# Patient Record
Sex: FEMALE | Race: BLACK OR AFRICAN AMERICAN | NOT HISPANIC OR LATINO | URBAN - METROPOLITAN AREA
[De-identification: names, ages, dates, MRNs, and addresses within clinical notes are randomized per-mention and may not be internally consistent; named-entity substitution may affect disease eponyms.]

---

## 2017-07-08 ENCOUNTER — EMERGENCY (EMERGENCY)
Facility: HOSPITAL | Age: 32
LOS: 1 days | Discharge: HOME | End: 2017-07-08
Attending: EMERGENCY MEDICINE | Admitting: EMERGENCY MEDICINE
Payer: MEDICAID

## 2017-07-08 VITALS
HEIGHT: 64 IN | OXYGEN SATURATION: 100 % | HEART RATE: 103 BPM | SYSTOLIC BLOOD PRESSURE: 112 MMHG | WEIGHT: 160.06 LBS | DIASTOLIC BLOOD PRESSURE: 74 MMHG | TEMPERATURE: 98 F | RESPIRATION RATE: 16 BRPM

## 2017-07-08 PROCEDURE — 99282 EMERGENCY DEPT VISIT SF MDM: CPT

## 2017-07-08 PROCEDURE — 99283 EMERGENCY DEPT VISIT LOW MDM: CPT

## 2017-07-08 NOTE — ED ADULT TRIAGE NOTE - CHIEF COMPLAINT QUOTE
pt presents to ED with productive cough, pt states she is 16 weeks pregnant. denies vaginal bleeding. pt c/o intermittent abd pain. denies n/v afebrile.

## 2017-07-12 ENCOUNTER — RECORD ABSTRACTING (OUTPATIENT)
Age: 32
End: 2017-07-12

## 2017-07-12 DIAGNOSIS — Z78.9 OTHER SPECIFIED HEALTH STATUS: ICD-10-CM

## 2017-07-12 DIAGNOSIS — Z13.79 ENCOUNTER FOR OTHER SCREENING FOR GENETIC AND CHROMOSOMAL ANOMALIES: ICD-10-CM

## 2017-08-01 ENCOUNTER — APPOINTMENT (OUTPATIENT)
Dept: ANTEPARTUM | Facility: CLINIC | Age: 32
End: 2017-08-01
Payer: MEDICAID

## 2017-08-01 ENCOUNTER — ASOB RESULT (OUTPATIENT)
Age: 32
End: 2017-08-01

## 2017-08-01 PROCEDURE — 76817 TRANSVAGINAL US OBSTETRIC: CPT

## 2017-08-01 PROCEDURE — 76811 OB US DETAILED SNGL FETUS: CPT

## 2017-08-07 ENCOUNTER — APPOINTMENT (OUTPATIENT)
Dept: ANTEPARTUM | Facility: CLINIC | Age: 32
End: 2017-08-07

## 2017-08-08 ENCOUNTER — APPOINTMENT (OUTPATIENT)
Dept: ANTEPARTUM | Facility: CLINIC | Age: 32
End: 2017-08-08
Payer: SELF-PAY

## 2017-08-08 ENCOUNTER — ASOB RESULT (OUTPATIENT)
Age: 32
End: 2017-08-08

## 2017-08-08 PROCEDURE — 76816 OB US FOLLOW-UP PER FETUS: CPT

## 2017-09-02 ENCOUNTER — OUTPATIENT (OUTPATIENT)
Dept: OUTPATIENT SERVICES | Facility: HOSPITAL | Age: 32
LOS: 1 days | End: 2017-09-02
Payer: MEDICAID

## 2017-09-02 DIAGNOSIS — O47.02 FALSE LABOR BEFORE 37 COMPLETED WEEKS OF GESTATION, SECOND TRIMESTER: ICD-10-CM

## 2017-09-02 LAB
APPEARANCE UR: CLEAR — SIGNIFICANT CHANGE UP
BILIRUB UR-MCNC: NEGATIVE — SIGNIFICANT CHANGE UP
COLOR SPEC: YELLOW — SIGNIFICANT CHANGE UP
DIFF PNL FLD: NEGATIVE — SIGNIFICANT CHANGE UP
GLUCOSE UR QL: NEGATIVE MG/DL — SIGNIFICANT CHANGE UP
KETONES UR-MCNC: NEGATIVE — SIGNIFICANT CHANGE UP
LEUKOCYTE ESTERASE UR-ACNC: NEGATIVE — SIGNIFICANT CHANGE UP
NITRITE UR-MCNC: NEGATIVE — SIGNIFICANT CHANGE UP
PH UR: 7 — SIGNIFICANT CHANGE UP (ref 5–8)
PROT UR-MCNC: NEGATIVE MG/DL — SIGNIFICANT CHANGE UP
SP GR SPEC: 1 — LOW (ref 1.01–1.02)
UROBILINOGEN FLD QL: NEGATIVE MG/DL — SIGNIFICANT CHANGE UP

## 2017-09-02 PROCEDURE — 81003 URINALYSIS AUTO W/O SCOPE: CPT

## 2017-09-02 PROCEDURE — G0463: CPT

## 2017-09-02 PROCEDURE — 59025 FETAL NON-STRESS TEST: CPT

## 2017-09-19 ENCOUNTER — ASOB RESULT (OUTPATIENT)
Age: 32
End: 2017-09-19

## 2017-09-19 ENCOUNTER — APPOINTMENT (OUTPATIENT)
Dept: ANTEPARTUM | Facility: CLINIC | Age: 32
End: 2017-09-19
Payer: MEDICAID

## 2017-09-19 PROCEDURE — 76805 OB US >/= 14 WKS SNGL FETUS: CPT

## 2017-09-19 PROCEDURE — 76819 FETAL BIOPHYS PROFIL W/O NST: CPT

## 2017-11-14 ENCOUNTER — ASOB RESULT (OUTPATIENT)
Age: 32
End: 2017-11-14

## 2017-11-14 ENCOUNTER — APPOINTMENT (OUTPATIENT)
Dept: MATERNAL FETAL MEDICINE | Facility: CLINIC | Age: 32
End: 2017-11-14

## 2017-11-14 ENCOUNTER — APPOINTMENT (OUTPATIENT)
Dept: ANTEPARTUM | Facility: CLINIC | Age: 32
End: 2017-11-14

## 2017-11-14 ENCOUNTER — APPOINTMENT (OUTPATIENT)
Dept: ANTEPARTUM | Facility: CLINIC | Age: 32
End: 2017-11-14
Payer: MEDICAID

## 2017-11-14 VITALS
RESPIRATION RATE: 16 BRPM | HEART RATE: 95 BPM | OXYGEN SATURATION: 98 % | SYSTOLIC BLOOD PRESSURE: 112 MMHG | DIASTOLIC BLOOD PRESSURE: 58 MMHG

## 2017-11-14 VITALS — WEIGHT: 168.44 LBS | HEIGHT: 66 IN | BODY MASS INDEX: 27.07 KG/M2

## 2017-11-14 PROCEDURE — G0108 DIAB MANAGE TRN  PER INDIV: CPT

## 2017-11-14 PROCEDURE — 76818 FETAL BIOPHYS PROFILE W/NST: CPT

## 2017-11-14 PROCEDURE — 76816 OB US FOLLOW-UP PER FETUS: CPT

## 2017-11-14 PROCEDURE — 76820 UMBILICAL ARTERY ECHO: CPT

## 2017-11-14 PROCEDURE — 93975 VASCULAR STUDY: CPT

## 2017-11-21 ENCOUNTER — APPOINTMENT (OUTPATIENT)
Dept: ANTEPARTUM | Facility: CLINIC | Age: 32
End: 2017-11-21
Payer: MEDICAID

## 2017-11-21 ENCOUNTER — ASOB RESULT (OUTPATIENT)
Age: 32
End: 2017-11-21

## 2017-11-21 ENCOUNTER — APPOINTMENT (OUTPATIENT)
Dept: MATERNAL FETAL MEDICINE | Facility: CLINIC | Age: 32
End: 2017-11-21

## 2017-11-21 VITALS — SYSTOLIC BLOOD PRESSURE: 110 MMHG | DIASTOLIC BLOOD PRESSURE: 60 MMHG | HEART RATE: 94 BPM | RESPIRATION RATE: 16 BRPM

## 2017-11-21 PROCEDURE — 76818 FETAL BIOPHYS PROFILE W/NST: CPT

## 2017-11-21 PROCEDURE — 76815 OB US LIMITED FETUS(S): CPT

## 2017-11-21 PROCEDURE — 76820 UMBILICAL ARTERY ECHO: CPT

## 2017-11-21 PROCEDURE — 93975 VASCULAR STUDY: CPT

## 2017-11-24 ENCOUNTER — INPATIENT (INPATIENT)
Facility: HOSPITAL | Age: 32
LOS: 1 days | Discharge: HOME | End: 2017-11-26
Attending: STUDENT IN AN ORGANIZED HEALTH CARE EDUCATION/TRAINING PROGRAM | Admitting: STUDENT IN AN ORGANIZED HEALTH CARE EDUCATION/TRAINING PROGRAM
Payer: MEDICAID

## 2017-11-24 ENCOUNTER — TRANSCRIPTION ENCOUNTER (OUTPATIENT)
Age: 32
End: 2017-11-24

## 2017-11-24 VITALS — HEIGHT: 64 IN | WEIGHT: 165.35 LBS

## 2017-11-24 DIAGNOSIS — O47.1 FALSE LABOR AT OR AFTER 37 COMPLETED WEEKS OF GESTATION: ICD-10-CM

## 2017-11-24 DIAGNOSIS — O26.893 OTHER SPECIFIED PREGNANCY RELATED CONDITIONS, THIRD TRIMESTER: ICD-10-CM

## 2017-11-24 LAB
ALBUMIN SERPL ELPH-MCNC: 3.4 G/DL — SIGNIFICANT CHANGE UP (ref 3.3–5.2)
ALP SERPL-CCNC: 126 U/L — HIGH (ref 40–120)
ALT FLD-CCNC: 9 U/L — SIGNIFICANT CHANGE UP
ANION GAP SERPL CALC-SCNC: 15 MMOL/L — SIGNIFICANT CHANGE UP (ref 5–17)
APPEARANCE UR: ABNORMAL
AST SERPL-CCNC: 13 U/L — SIGNIFICANT CHANGE UP
BACTERIA # UR AUTO: ABNORMAL
BASE EXCESS BLDCOA CALC-SCNC: -2.9 MMOL/L — SIGNIFICANT CHANGE UP (ref -11.6–0.4)
BASE EXCESS BLDCOV CALC-SCNC: -2.3 MMOL/L — SIGNIFICANT CHANGE UP (ref -9.3–0.3)
BASOPHILS # BLD AUTO: 0 K/UL — SIGNIFICANT CHANGE UP (ref 0–0.2)
BASOPHILS NFR BLD AUTO: 0.2 % — SIGNIFICANT CHANGE UP (ref 0–2)
BILIRUB SERPL-MCNC: 0.4 MG/DL — SIGNIFICANT CHANGE UP (ref 0.4–2)
BILIRUB UR-MCNC: NEGATIVE — SIGNIFICANT CHANGE UP
BLD GP AB SCN SERPL QL: SIGNIFICANT CHANGE UP
BUN SERPL-MCNC: 6 MG/DL — LOW (ref 8–20)
CALCIUM SERPL-MCNC: 9.5 MG/DL — SIGNIFICANT CHANGE UP (ref 8.6–10.2)
CHLORIDE SERPL-SCNC: 97 MMOL/L — LOW (ref 98–107)
CO2 SERPL-SCNC: 22 MMOL/L — SIGNIFICANT CHANGE UP (ref 22–29)
COLOR SPEC: YELLOW — SIGNIFICANT CHANGE UP
CREAT SERPL-MCNC: 0.54 MG/DL — SIGNIFICANT CHANGE UP (ref 0.5–1.3)
DIFF PNL FLD: ABNORMAL
EOSINOPHIL # BLD AUTO: 0.1 K/UL — SIGNIFICANT CHANGE UP (ref 0–0.5)
EOSINOPHIL NFR BLD AUTO: 0.6 % — SIGNIFICANT CHANGE UP (ref 0–6)
EPI CELLS # UR: SIGNIFICANT CHANGE UP
GAS PNL BLDCOV: 7.34 — SIGNIFICANT CHANGE UP (ref 7.11–7.36)
GLUCOSE SERPL-MCNC: 100 MG/DL — SIGNIFICANT CHANGE UP (ref 70–115)
GLUCOSE UR QL: NEGATIVE MG/DL — SIGNIFICANT CHANGE UP
HCO3 BLDCOA-SCNC: 25 MMOL/L — SIGNIFICANT CHANGE UP (ref 15–27)
HCO3 BLDCOV-SCNC: 23 MMOL/L — SIGNIFICANT CHANGE UP (ref 17–25)
HCT VFR BLD CALC: 36.4 % — LOW (ref 37–47)
HGB BLD-MCNC: 12.1 G/DL — SIGNIFICANT CHANGE UP (ref 12–16)
KETONES UR-MCNC: NEGATIVE — SIGNIFICANT CHANGE UP
LEUKOCYTE ESTERASE UR-ACNC: ABNORMAL
LYMPHOCYTES # BLD AUTO: 2.4 K/UL — SIGNIFICANT CHANGE UP (ref 1–4.8)
LYMPHOCYTES # BLD AUTO: 23.7 % — SIGNIFICANT CHANGE UP (ref 20–55)
MCHC RBC-ENTMCNC: 27.8 PG — SIGNIFICANT CHANGE UP (ref 27–31)
MCHC RBC-ENTMCNC: 33.2 G/DL — SIGNIFICANT CHANGE UP (ref 32–36)
MCV RBC AUTO: 83.5 FL — SIGNIFICANT CHANGE UP (ref 81–99)
MONOCYTES # BLD AUTO: 0.6 K/UL — SIGNIFICANT CHANGE UP (ref 0–0.8)
MONOCYTES NFR BLD AUTO: 6.5 % — SIGNIFICANT CHANGE UP (ref 3–10)
NEUTROPHILS # BLD AUTO: 6.8 K/UL — SIGNIFICANT CHANGE UP (ref 1.8–8)
NEUTROPHILS NFR BLD AUTO: 68.5 % — SIGNIFICANT CHANGE UP (ref 37–73)
NITRITE UR-MCNC: NEGATIVE — SIGNIFICANT CHANGE UP
PCO2 BLDCOA: 55.3 MMHG — SIGNIFICANT CHANGE UP (ref 32.2–65.8)
PCO2 BLDCOV: 42.9 MMHG — SIGNIFICANT CHANGE UP (ref 27–49.4)
PH BLDCOA: 7.26 — SIGNIFICANT CHANGE UP (ref 7.11–7.36)
PH UR: 7 — SIGNIFICANT CHANGE UP (ref 5–8)
PLATELET # BLD AUTO: 241 K/UL — SIGNIFICANT CHANGE UP (ref 150–400)
PO2 BLDCOA: 20.5 MMHG — SIGNIFICANT CHANGE UP (ref 6–30)
PO2 BLDCOA: 33.7 MMHG — SIGNIFICANT CHANGE UP (ref 17.4–41)
POTASSIUM SERPL-MCNC: 3.7 MMOL/L — SIGNIFICANT CHANGE UP (ref 3.5–5.3)
POTASSIUM SERPL-SCNC: 3.7 MMOL/L — SIGNIFICANT CHANGE UP (ref 3.5–5.3)
PROT SERPL-MCNC: 7.4 G/DL — SIGNIFICANT CHANGE UP (ref 6.6–8.7)
PROT UR-MCNC: 15 MG/DL
RBC # BLD: 4.36 M/UL — LOW (ref 4.4–5.2)
RBC # FLD: 12.9 % — SIGNIFICANT CHANGE UP (ref 11–15.6)
RBC CASTS # UR COMP ASSIST: ABNORMAL /HPF (ref 0–4)
SAO2 % BLDCOA: SIGNIFICANT CHANGE UP
SAO2 % BLDCOV: SIGNIFICANT CHANGE UP
SODIUM SERPL-SCNC: 134 MMOL/L — LOW (ref 135–145)
SP GR SPEC: 1.01 — SIGNIFICANT CHANGE UP (ref 1.01–1.02)
TYPE + AB SCN PNL BLD: SIGNIFICANT CHANGE UP
UROBILINOGEN FLD QL: NEGATIVE MG/DL — SIGNIFICANT CHANGE UP
WBC # BLD: 9.9 K/UL — SIGNIFICANT CHANGE UP (ref 4.8–10.8)
WBC # FLD AUTO: 9.9 K/UL — SIGNIFICANT CHANGE UP (ref 4.8–10.8)
WBC UR QL: ABNORMAL

## 2017-11-24 RX ORDER — LANOLIN
1 OINTMENT (GRAM) TOPICAL EVERY 6 HOURS
Qty: 0 | Refills: 0 | Status: DISCONTINUED | OUTPATIENT
Start: 2017-11-24 | End: 2017-11-26

## 2017-11-24 RX ORDER — SODIUM CHLORIDE 9 MG/ML
1000 INJECTION, SOLUTION INTRAVENOUS ONCE
Qty: 0 | Refills: 0 | Status: COMPLETED | OUTPATIENT
Start: 2017-11-24 | End: 2017-11-24

## 2017-11-24 RX ORDER — SODIUM CHLORIDE 9 MG/ML
1000 INJECTION, SOLUTION INTRAVENOUS
Qty: 0 | Refills: 0 | Status: DISCONTINUED | OUTPATIENT
Start: 2017-11-24 | End: 2017-11-24

## 2017-11-24 RX ORDER — ACETAMINOPHEN 500 MG
650 TABLET ORAL EVERY 6 HOURS
Qty: 0 | Refills: 0 | Status: DISCONTINUED | OUTPATIENT
Start: 2017-11-24 | End: 2017-11-26

## 2017-11-24 RX ORDER — OXYTOCIN 10 UNIT/ML
41.67 VIAL (ML) INJECTION
Qty: 20 | Refills: 0 | Status: DISCONTINUED | OUTPATIENT
Start: 2017-11-24 | End: 2017-11-26

## 2017-11-24 RX ORDER — IBUPROFEN 200 MG
600 TABLET ORAL EVERY 6 HOURS
Qty: 0 | Refills: 0 | Status: DISCONTINUED | OUTPATIENT
Start: 2017-11-24 | End: 2017-11-26

## 2017-11-24 RX ORDER — DIPHENHYDRAMINE HCL 50 MG
25 CAPSULE ORAL EVERY 6 HOURS
Qty: 0 | Refills: 0 | Status: DISCONTINUED | OUTPATIENT
Start: 2017-11-24 | End: 2017-11-26

## 2017-11-24 RX ORDER — OXYTOCIN 10 UNIT/ML
333.33 VIAL (ML) INJECTION
Qty: 20 | Refills: 0 | Status: DISCONTINUED | OUTPATIENT
Start: 2017-11-24 | End: 2017-11-26

## 2017-11-24 RX ORDER — CITRIC ACID/SODIUM CITRATE 300-500 MG
30 SOLUTION, ORAL ORAL ONCE
Qty: 0 | Refills: 0 | Status: DISCONTINUED | OUTPATIENT
Start: 2017-11-24 | End: 2017-11-24

## 2017-11-24 RX ORDER — MAGNESIUM HYDROXIDE 400 MG/1
30 TABLET, CHEWABLE ORAL
Qty: 0 | Refills: 0 | Status: DISCONTINUED | OUTPATIENT
Start: 2017-11-24 | End: 2017-11-26

## 2017-11-24 RX ORDER — GLYCERIN ADULT
1 SUPPOSITORY, RECTAL RECTAL AT BEDTIME
Qty: 0 | Refills: 0 | Status: DISCONTINUED | OUTPATIENT
Start: 2017-11-24 | End: 2017-11-26

## 2017-11-24 RX ORDER — PENICILLIN G POTASSIUM 5000000 [IU]/1
5 POWDER, FOR SOLUTION INTRAMUSCULAR; INTRAPLEURAL; INTRATHECAL; INTRAVENOUS ONCE
Qty: 0 | Refills: 0 | Status: COMPLETED | OUTPATIENT
Start: 2017-11-24 | End: 2017-11-24

## 2017-11-24 RX ORDER — PENICILLIN G POTASSIUM 5000000 [IU]/1
POWDER, FOR SOLUTION INTRAMUSCULAR; INTRAPLEURAL; INTRATHECAL; INTRAVENOUS
Qty: 0 | Refills: 0 | Status: DISCONTINUED | OUTPATIENT
Start: 2017-11-24 | End: 2017-11-24

## 2017-11-24 RX ORDER — TETANUS TOXOID, REDUCED DIPHTHERIA TOXOID AND ACELLULAR PERTUSSIS VACCINE, ADSORBED 5; 2.5; 8; 8; 2.5 [IU]/.5ML; [IU]/.5ML; UG/.5ML; UG/.5ML; UG/.5ML
0.5 SUSPENSION INTRAMUSCULAR ONCE
Qty: 0 | Refills: 0 | Status: DISCONTINUED | OUTPATIENT
Start: 2017-11-24 | End: 2017-11-26

## 2017-11-24 RX ORDER — PRAMOXINE HYDROCHLORIDE 150 MG/15G
1 AEROSOL, FOAM RECTAL EVERY 4 HOURS
Qty: 0 | Refills: 0 | Status: DISCONTINUED | OUTPATIENT
Start: 2017-11-24 | End: 2017-11-26

## 2017-11-24 RX ORDER — HYDROCORTISONE 1 %
1 OINTMENT (GRAM) TOPICAL EVERY 4 HOURS
Qty: 0 | Refills: 0 | Status: DISCONTINUED | OUTPATIENT
Start: 2017-11-24 | End: 2017-11-26

## 2017-11-24 RX ORDER — AER TRAVELER 0.5 G/1
1 SOLUTION RECTAL; TOPICAL EVERY 4 HOURS
Qty: 0 | Refills: 0 | Status: DISCONTINUED | OUTPATIENT
Start: 2017-11-24 | End: 2017-11-26

## 2017-11-24 RX ORDER — DOCUSATE SODIUM 100 MG
100 CAPSULE ORAL
Qty: 0 | Refills: 0 | Status: DISCONTINUED | OUTPATIENT
Start: 2017-11-24 | End: 2017-11-26

## 2017-11-24 RX ORDER — OXYTOCIN 10 UNIT/ML
333.33 VIAL (ML) INJECTION
Qty: 20 | Refills: 0 | Status: COMPLETED | OUTPATIENT
Start: 2017-11-24

## 2017-11-24 RX ORDER — INFLUENZA VIRUS VACCINE 15; 15; 15; 15 UG/.5ML; UG/.5ML; UG/.5ML; UG/.5ML
0.5 SUSPENSION INTRAMUSCULAR ONCE
Qty: 0 | Refills: 0 | Status: COMPLETED | OUTPATIENT
Start: 2017-11-24 | End: 2017-11-25

## 2017-11-24 RX ORDER — DIBUCAINE 1 %
1 OINTMENT (GRAM) RECTAL EVERY 4 HOURS
Qty: 0 | Refills: 0 | Status: DISCONTINUED | OUTPATIENT
Start: 2017-11-24 | End: 2017-11-26

## 2017-11-24 RX ORDER — OXYCODONE AND ACETAMINOPHEN 5; 325 MG/1; MG/1
2 TABLET ORAL EVERY 6 HOURS
Qty: 0 | Refills: 0 | Status: DISCONTINUED | OUTPATIENT
Start: 2017-11-24 | End: 2017-11-26

## 2017-11-24 RX ORDER — SODIUM CHLORIDE 9 MG/ML
3 INJECTION INTRAMUSCULAR; INTRAVENOUS; SUBCUTANEOUS EVERY 8 HOURS
Qty: 0 | Refills: 0 | Status: DISCONTINUED | OUTPATIENT
Start: 2017-11-24 | End: 2017-11-26

## 2017-11-24 RX ORDER — PENICILLIN G POTASSIUM 5000000 [IU]/1
2.5 POWDER, FOR SOLUTION INTRAMUSCULAR; INTRAPLEURAL; INTRATHECAL; INTRAVENOUS EVERY 4 HOURS
Qty: 0 | Refills: 0 | Status: DISCONTINUED | OUTPATIENT
Start: 2017-11-24 | End: 2017-11-24

## 2017-11-24 RX ORDER — SIMETHICONE 80 MG/1
80 TABLET, CHEWABLE ORAL EVERY 6 HOURS
Qty: 0 | Refills: 0 | Status: DISCONTINUED | OUTPATIENT
Start: 2017-11-24 | End: 2017-11-26

## 2017-11-24 RX ORDER — OXYTOCIN 10 UNIT/ML
41.67 VIAL (ML) INJECTION
Qty: 20 | Refills: 0 | Status: DISCONTINUED | OUTPATIENT
Start: 2017-11-24 | End: 2017-11-24

## 2017-11-24 RX ADMIN — SODIUM CHLORIDE 2000 MILLILITER(S): 9 INJECTION, SOLUTION INTRAVENOUS at 02:30

## 2017-11-24 RX ADMIN — SODIUM CHLORIDE 125 MILLILITER(S): 9 INJECTION, SOLUTION INTRAVENOUS at 03:30

## 2017-11-24 RX ADMIN — Medication 1000 MILLIUNIT(S)/MIN: at 10:25

## 2017-11-24 RX ADMIN — Medication 600 MILLIGRAM(S): at 11:39

## 2017-11-24 RX ADMIN — Medication 600 MILLIGRAM(S): at 12:38

## 2017-11-24 RX ADMIN — Medication 600 MILLIGRAM(S): at 22:15

## 2017-11-24 RX ADMIN — SODIUM CHLORIDE 125 MILLILITER(S): 9 INJECTION, SOLUTION INTRAVENOUS at 06:53

## 2017-11-24 RX ADMIN — PENICILLIN G POTASSIUM 200 MILLION UNIT(S): 5000000 POWDER, FOR SOLUTION INTRAMUSCULAR; INTRAPLEURAL; INTRATHECAL; INTRAVENOUS at 03:41

## 2017-11-24 RX ADMIN — Medication 600 MILLIGRAM(S): at 21:18

## 2017-11-24 RX ADMIN — PENICILLIN G POTASSIUM 200 MILLION UNIT(S): 5000000 POWDER, FOR SOLUTION INTRAMUSCULAR; INTRAPLEURAL; INTRATHECAL; INTRAVENOUS at 07:38

## 2017-11-24 NOTE — DISCHARGE NOTE OB - MEDICATION SUMMARY - MEDICATIONS TO TAKE
I will START or STAY ON the medications listed below when I get home from the hospital:    ibuprofen 600 mg oral tablet  -- 1 tab(s) by mouth 4 times a day MDD:PRN for pain;  maximum of 4 tabs daily;  -- Do not take this drug if you are pregnant.  It is very important that you take or use this exactly as directed.  Do not skip doses or discontinue unless directed by your doctor.  May cause drowsiness or dizziness.  Obtain medical advice before taking any non-prescription drugs as some may affect the action of this medication.  Take with food or milk.    -- Indication: For pain    pramoxine-zinc oxide 1%-5% topical cream  --  MDD:Apply externally to clean, dry hemorrhoidal area, up to 3 times/day  -- For external use only.    -- Indication: For hemorrhoids    Prenatal Multivitamins with FA 0.8 mg oral tablet  -- 1 tab(s) by mouth once a day   -- May discolor urine or feces.  Take with food or milk.    -- Indication: For Nutrition    Metamucil 400 mg oral capsule  -- 1 cap(s) by mouth once a day   -- Medication should be taken with plenty of water.    -- Indication: For constipation;  hemorrhoids    docusate sodium 100 mg oral capsule  -- 1 cap(s) by mouth 2 times a day, As needed, Stool Softening  -- Indication: For hemorrhoids; constipation

## 2017-11-24 NOTE — DISCHARGE NOTE OB - PLAN OF CARE
continue normal post partum care; Recommend continuing PO intake and hydration, mother/baby interaction and breastfeeding, ambulation  Continue treatment of external hemorrhoids;  patient will follow up with ob on an outpatient basis;    Continue to take your prenatal vitamins and iron supplements. You should continue to take both until your 3 week postpartum visit. Beyond that time, if you are nursing, take your prenatal vitamins for as long as you are nursing. You needn't continue the iron pills after 3 weeks postpartum unless you have been instructed to do so. If you are bottlefeeding, an over-the-counter multi-vitamin with iron is sufficient.    It is normal to have bleeding following a vaginal delivery. You will notice that your bleeding has slowed down even from the time of delivery to your discharge. Once you get home, you may have a light flow of blood mixed with mucus (lochia). This may continue from a few days up to your 3 week postpartum visit. It should be no more than a light flow. If you are soaking a maxi-pad in one hour's time or passing clots larger than a quarter, call the office. resolving; Do not strain at stool  Do not sit on toilet for prolonged periods  Do not read on toilet    Practice good hygiene  Witch hazel Tucks  Baby wipes without Alcohol  Avoid topical irritant or allergens  Use only hypoallergenic soaps  Use only white toilet tissue  Preparation H prescribed;   Analgesics (Ibuprofen prescribed)  Cold pack applied to anal area  Hot sitz bath in tub for 20-30 minutes twice daily    Goal is soft bulky stool that is easily passed without straining  High bulk diet (Dietary Fiber) 30 grams per day  Increased volume of fluids 64 ounces non-caffeinated fluid per day  Stool Softener (e.g. Colace)

## 2017-11-24 NOTE — DISCHARGE NOTE OB - OTHER SIGNIFICANT FINDINGS
11/24/17    Positive Quantiferon gold testing;    Quantiferon Adjusted TB Antigen=0.93  Quantiferon Nil=0.03  Quantiferon Adjusted Mitogen= 8.50      Lab Results:  CBC  CBC Full  -  ( 25 Nov 2017 06:49 )  WBC Count : 11.7 K/uL  Hemoglobin : 10.2 g/dL  Hematocrit : 31.6 %  Platelet Count - Automated : 236 K/uL  Mean Cell Volume : 84.5 fl  Mean Cell Hemoglobin : 27.3 pg  Mean Cell Hemoglobin Concentration : 32.3 g/dL  Auto Neutrophil # : 8.1 K/uL  Auto Lymphocyte # : 2.8 K/uL  Auto Monocyte # : 0.7 K/uL  Auto Eosinophil # : 0.1 K/uL  Auto Basophil # : 0.0 K/uL  Auto Neutrophil % : 69.1 %  Auto Lymphocyte % : 23.9 %  Auto Monocyte % : 5.9 %  Auto Eosinophil % : 0.6 %  Auto Basophil % : 0.2 %    .		Differential:	[] Automated		[] Manual  Chemistry                        10.2   11.7  )-----------( 236      ( 25 Nov 2017 06:49 )             31.6

## 2017-11-24 NOTE — DISCHARGE NOTE OB - PATIENT PORTAL LINK FT
“You can access the [employer] Patient Portal, offered by [facility], by registering with the following website: [email or web address]”

## 2017-11-24 NOTE — DISCHARGE NOTE OB - MEDICATION SUMMARY - MEDICATIONS TO STOP TAKING
I will STOP taking the medications listed below when I get home from the hospital:    Macrobid 100 mg oral capsule  -- 1 cap(s) by mouth 2 times a day  -- Finish all this medication unless otherwise directed by prescriber.  May discolor urine or feces.  Take with food or milk.    Pyridium 100 mg oral tablet  -- 1 tab(s) by mouth 3 times a day  -- May discolor urine or feces.  Medication should be taken with plenty of water.  Take with food or milk.

## 2017-11-24 NOTE — DISCHARGE NOTE OB - CARE PLAN
Principal Discharge DX:	 (normal spontaneous vaginal delivery)  Goal:	continue normal post partum care;  Instructions for follow-up, activity and diet:	Recommend continuing PO intake and hydration, mother/baby interaction and breastfeeding, ambulation  Continue treatment of external hemorrhoids;  patient will follow up with ob on an outpatient basis;    Continue to take your prenatal vitamins and iron supplements. You should continue to take both until your 3 week postpartum visit. Beyond that time, if you are nursing, take your prenatal vitamins for as long as you are nursing. You needn't continue the iron pills after 3 weeks postpartum unless you have been instructed to do so. If you are bottlefeeding, an over-the-counter multi-vitamin with iron is sufficient.    It is normal to have bleeding following a vaginal delivery. You will notice that your bleeding has slowed down even from the time of delivery to your discharge. Once you get home, you may have a light flow of blood mixed with mucus (lochia). This may continue from a few days up to your 3 week postpartum visit. It should be no more than a light flow. If you are soaking a maxi-pad in one hour's time or passing clots larger than a quarter, call the office.  Secondary Diagnosis:	Hemorrhoids during pregnancy, delivered  Goal:	resolving;  Instructions for follow-up, activity and diet:	Do not strain at stool  Do not sit on toilet for prolonged periods  Do not read on toilet    Practice good hygiene  Witch hazel Tucks  Baby wipes without Alcohol  Avoid topical irritant or allergens  Use only hypoallergenic soaps  Use only white toilet tissue  Preparation H prescribed;   Analgesics (Ibuprofen prescribed)  Cold pack applied to anal area  Hot sitz bath in tub for 20-30 minutes twice daily    Goal is soft bulky stool that is easily passed without straining  High bulk diet (Dietary Fiber) 30 grams per day  Increased volume of fluids 64 ounces non-caffeinated fluid per day  Stool Softener (e.g. Colace)

## 2017-11-24 NOTE — DISCHARGE NOTE OB - HOSPITAL COURSE
Labor course uncomplicated.  Patient was transferred to the post partum floor and closely monitored, with an uncomplicated course receiving routine post partum care.  Now on post partum day 2, s/p , she is medically stable for discharge and follow up at Primary OB office within 6 weeks.  She verbalizes understanding and agreement with the treatment and follow up plan and is satisfied with her care.

## 2017-11-24 NOTE — DISCHARGE NOTE OB - CARE PROVIDER_API CALL
Deb Alva), Obstetrics and Gynecology  [facility] [place] [address]  Phone: [phone]  Fax: [phone]

## 2017-11-25 LAB
BASOPHILS # BLD AUTO: 0 K/UL — SIGNIFICANT CHANGE UP (ref 0–0.2)
BASOPHILS NFR BLD AUTO: 0.2 % — SIGNIFICANT CHANGE UP (ref 0–2)
EOSINOPHIL # BLD AUTO: 0.1 K/UL — SIGNIFICANT CHANGE UP (ref 0–0.5)
EOSINOPHIL NFR BLD AUTO: 0.6 % — SIGNIFICANT CHANGE UP (ref 0–6)
HCT VFR BLD CALC: 31.6 % — LOW (ref 37–47)
HGB BLD-MCNC: 10.2 G/DL — LOW (ref 12–16)
LYMPHOCYTES # BLD AUTO: 2.8 K/UL — SIGNIFICANT CHANGE UP (ref 1–4.8)
LYMPHOCYTES # BLD AUTO: 23.9 % — SIGNIFICANT CHANGE UP (ref 20–55)
MCHC RBC-ENTMCNC: 27.3 PG — SIGNIFICANT CHANGE UP (ref 27–31)
MCHC RBC-ENTMCNC: 32.3 G/DL — SIGNIFICANT CHANGE UP (ref 32–36)
MCV RBC AUTO: 84.5 FL — SIGNIFICANT CHANGE UP (ref 81–99)
MONOCYTES # BLD AUTO: 0.7 K/UL — SIGNIFICANT CHANGE UP (ref 0–0.8)
MONOCYTES NFR BLD AUTO: 5.9 % — SIGNIFICANT CHANGE UP (ref 3–10)
NEUTROPHILS # BLD AUTO: 8.1 K/UL — HIGH (ref 1.8–8)
NEUTROPHILS NFR BLD AUTO: 69.1 % — SIGNIFICANT CHANGE UP (ref 37–73)
PLATELET # BLD AUTO: 236 K/UL — SIGNIFICANT CHANGE UP (ref 150–400)
RBC # BLD: 3.74 M/UL — LOW (ref 4.4–5.2)
RBC # FLD: 13 % — SIGNIFICANT CHANGE UP (ref 11–15.6)
RUBV IGG SER-ACNC: 2.6 INDEX — SIGNIFICANT CHANGE UP
RUBV IGG SER-IMP: POSITIVE — SIGNIFICANT CHANGE UP
T PALLIDUM AB TITR SER: NEGATIVE — SIGNIFICANT CHANGE UP
VZV IGG FLD QL IA: 589.4 INDEX — SIGNIFICANT CHANGE UP
VZV IGG FLD QL IA: POSITIVE — SIGNIFICANT CHANGE UP
WBC # BLD: 11.7 K/UL — HIGH (ref 4.8–10.8)
WBC # FLD AUTO: 11.7 K/UL — HIGH (ref 4.8–10.8)

## 2017-11-25 RX ADMIN — Medication 600 MILLIGRAM(S): at 14:19

## 2017-11-25 RX ADMIN — INFLUENZA VIRUS VACCINE 0.5 MILLILITER(S): 15; 15; 15; 15 SUSPENSION INTRAMUSCULAR at 22:22

## 2017-11-25 RX ADMIN — Medication 600 MILLIGRAM(S): at 07:30

## 2017-11-25 RX ADMIN — Medication 1 TABLET(S): at 14:19

## 2017-11-25 RX ADMIN — Medication 600 MILLIGRAM(S): at 06:41

## 2017-11-25 RX ADMIN — Medication 600 MILLIGRAM(S): at 14:55

## 2017-11-25 NOTE — PROGRESS NOTE ADULT - SUBJECTIVE AND OBJECTIVE BOX
INTERVAL HPI/OVERNIGHT EVENTS:  31y Female s/p labor epidural on 11/25/17    Vital Signs Last 24 Hrs  T(C): 36.8 (25 Nov 2017 08:33), Max: 36.8 (25 Nov 2017 08:33)  T(F): 98.2 (25 Nov 2017 08:33), Max: 98.2 (25 Nov 2017 08:33)  HR: 88 (25 Nov 2017 08:33) (88 - 93)  BP: 97/61 (25 Nov 2017 08:33) (97/61 - 101/58)  BP(mean): --  RR: 18 (25 Nov 2017 08:33) (18 - 20)  SpO2: --    Patient seen, doing well, no anesthetic complications or complaints noted or reported.

## 2017-11-25 NOTE — PROGRESS NOTE ADULT - PROBLEM SELECTOR PLAN 1
Continue current management  Encourage PO intake and hydration  Encourage mother/baby interaction and breastfeeding  Encourage ambulation 2nd degree laceration intrapartum, repaired  Continue current management  Encourage PO intake and hydration  Encourage mother/baby interaction and breastfeeding  Encourage ambulation

## 2017-11-25 NOTE — PROGRESS NOTE ADULT - SUBJECTIVE AND OBJECTIVE BOX
31y year old  s/p  at wks gestation PPD#1.   Patient seen and examined at bedside, no acute overnight events.   Patient is ambulating, +eating, +PO hydration, +voiding, +Flatus, -BM, +Formula feeding and breastfeeding. She state she gets pain in her rectum but overall it is well controlled. Vaginal bleeding has reduced.  Denies headache, SOB, fever, chills and calf pain.    VS:   Vital Signs Last 24 Hrs  T(C): 36.7 (2017 20:23), Max: 36.8 (2017 12:44)  T(F): 98.1 (2017 20:23), Max: 98.2 (2017 12:44)  HR: 93 (2017 20:23) (73 - 98)  BP: 101/58 (2017 20:23) (101/58 - 129/68)  RR: 20 (2017 20:23) (18 - 20)    Physical Exam:  General: NAD  CVS: RRR, +S1/S2  Lungs: CTAB, no wheeze, ronchi or rales.   Breast: No tenderness or abnormal discharge.  Abdomen: +BS, soft, ND, minimally tender, Fundus firm at level of umbilicus.   Pelvic: Minimal lochia  Ext: No cyanosis, edema or calf tenderness.     Labs:                        12.1   9.9   )-----------( 241      ( 2017 03:10 )             36.4     Urinalysis Basic - ( 2017 03:10 )    Color: Yellow / Appearance: Slightly Turbid / S.010 / pH: x  Gluc: x / Ketone: Negative  / Bili: Negative / Urobili: Negative mg/dL   Blood: x / Protein: 15 mg/dL / Nitrite: Negative   Leuk Esterase: Small / RBC: 6-10 /HPF / WBC 6-10   Sq Epi: x / Non Sq Epi: Few / Bacteria: Few        Medication:  MEDICATIONS  (STANDING):  diphtheria/tetanus/pertussis (acellular) Vaccine (ADAcel) 0.5 milliLiter(s) IntraMuscular once  influenza   Vaccine 0.5 milliLiter(s) IntraMuscular once  oxytocin Infusion 41.667 milliUNIT(s)/Min (125 mL/Hr) IV Continuous <Continuous>  oxytocin Infusion 333.333 milliUNIT(s)/Min (1000 mL/Hr) IV Continuous <Continuous>  prenatal multivitamin 1 Tablet(s) Oral daily  sodium chloride 0.9% lock flush 3 milliLiter(s) IV Push every 8 hours    MEDICATIONS  (PRN):  acetaminophen   Tablet 650 milliGRAM(s) Oral every 6 hours PRN For Temp greater than 38.5 C (101.3 F)  acetaminophen   Tablet. 650 milliGRAM(s) Oral every 6 hours PRN Mild Pain  dibucaine 1% Ointment 1 Application(s) Topical every 4 hours PRN Perineal Discomfort  diphenhydrAMINE   Capsule 25 milliGRAM(s) Oral every 6 hours PRN Itching  docusate sodium 100 milliGRAM(s) Oral two times a day PRN Stool Softening  glycerin Suppository - Adult 1 Suppository(s) Rectal at bedtime PRN Constipation  hydrocortisone 1% Cream 1 Application(s) Topical every 4 hours PRN Moderate to Severe Perineal Pain  ibuprofen  Tablet 600 milliGRAM(s) Oral every 6 hours PRN Moderate Pain  lanolin Ointment 1 Application(s) Topical every 6 hours PRN Sore Nipples  magnesium hydroxide Suspension 30 milliLiter(s) Oral two times a day PRN Constipation  oxyCODONE    5 mG/acetaminophen 325 mG 2 Tablet(s) Oral every 6 hours PRN Severe Pain  pramoxine 1%/zinc 5% Cream 1 Application(s) Topical every 4 hours PRN Moderate to Severe Perineal Pain  simethicone 80 milliGRAM(s) Chew every 6 hours PRN Gas  witch hazel Pads 1 Application(s) Topical every 4 hours PRN Perineal Discomfort 31y year old  s/p  at 39wks gestation PPD#1.   Patient seen and examined at bedside, no acute overnight events.   Patient is ambulating, +eating, +PO hydration, +voiding, +Flatus, -BM, +Formula feeding and breastfeeding. She state she gets pain in her rectum but overall it is well controlled. Vaginal bleeding has reduced.  Denies headache, SOB, fever, chills and calf pain.    VS:   Vital Signs Last 24 Hrs  T(C): 36.7 (2017 20:23), Max: 36.8 (2017 12:44)  T(F): 98.1 (2017 20:23), Max: 98.2 (2017 12:44)  HR: 93 (2017 20:23) (73 - 98)  BP: 101/58 (2017 20:23) (101/58 - 129/68)  RR: 20 (2017 20:23) (18 - 20)    Physical Exam:  General: NAD  CVS: RRR, +S1/S2  Lungs: CTAB, no wheeze, ronchi or rales.   Breast: No tenderness or abnormal discharge.  Abdomen: +BS, soft, ND, minimally tender, Fundus firm at level of umbilicus.   Pelvic: Minimal lochia  Ext: No cyanosis, edema or calf tenderness.     Labs:                        12.1   9.9   )-----------( 241      ( 2017 03:10 )             36.4     Urinalysis Basic - ( 2017 03:10 )    Color: Yellow / Appearance: Slightly Turbid / S.010 / pH: x  Gluc: x / Ketone: Negative  / Bili: Negative / Urobili: Negative mg/dL   Blood: x / Protein: 15 mg/dL / Nitrite: Negative   Leuk Esterase: Small / RBC: 6-10 /HPF / WBC 6-10   Sq Epi: x / Non Sq Epi: Few / Bacteria: Few        Medication:  MEDICATIONS  (STANDING):  diphtheria/tetanus/pertussis (acellular) Vaccine (ADAcel) 0.5 milliLiter(s) IntraMuscular once  influenza   Vaccine 0.5 milliLiter(s) IntraMuscular once  oxytocin Infusion 41.667 milliUNIT(s)/Min (125 mL/Hr) IV Continuous <Continuous>  oxytocin Infusion 333.333 milliUNIT(s)/Min (1000 mL/Hr) IV Continuous <Continuous>  prenatal multivitamin 1 Tablet(s) Oral daily  sodium chloride 0.9% lock flush 3 milliLiter(s) IV Push every 8 hours    MEDICATIONS  (PRN):  acetaminophen   Tablet 650 milliGRAM(s) Oral every 6 hours PRN For Temp greater than 38.5 C (101.3 F)  acetaminophen   Tablet. 650 milliGRAM(s) Oral every 6 hours PRN Mild Pain  dibucaine 1% Ointment 1 Application(s) Topical every 4 hours PRN Perineal Discomfort  diphenhydrAMINE   Capsule 25 milliGRAM(s) Oral every 6 hours PRN Itching  docusate sodium 100 milliGRAM(s) Oral two times a day PRN Stool Softening  glycerin Suppository - Adult 1 Suppository(s) Rectal at bedtime PRN Constipation  hydrocortisone 1% Cream 1 Application(s) Topical every 4 hours PRN Moderate to Severe Perineal Pain  ibuprofen  Tablet 600 milliGRAM(s) Oral every 6 hours PRN Moderate Pain  lanolin Ointment 1 Application(s) Topical every 6 hours PRN Sore Nipples  magnesium hydroxide Suspension 30 milliLiter(s) Oral two times a day PRN Constipation  oxyCODONE    5 mG/acetaminophen 325 mG 2 Tablet(s) Oral every 6 hours PRN Severe Pain  pramoxine 1%/zinc 5% Cream 1 Application(s) Topical every 4 hours PRN Moderate to Severe Perineal Pain  simethicone 80 milliGRAM(s) Chew every 6 hours PRN Gas  witch hazel Pads 1 Application(s) Topical every 4 hours PRN Perineal Discomfort

## 2017-11-25 NOTE — PROGRESS NOTE ADULT - ASSESSMENT
31y year old  s/p  at wks gestation PPD#1. Had an intrapartum 2nd degree laceration which was repaired. 31y year old  s/p  at 39wks gestation PPD#1.

## 2017-11-26 VITALS
RESPIRATION RATE: 18 BRPM | TEMPERATURE: 98 F | SYSTOLIC BLOOD PRESSURE: 123 MMHG | DIASTOLIC BLOOD PRESSURE: 73 MMHG | HEART RATE: 96 BPM

## 2017-11-26 LAB
M TB TUBERC IFN-G BLD QL: 0.03 IU/ML — SIGNIFICANT CHANGE UP
M TB TUBERC IFN-G BLD QL: 0.93 IU/ML — SIGNIFICANT CHANGE UP
M TB TUBERC IFN-G BLD QL: POSITIVE
MITOGEN IGNF BCKGRD COR BLD-ACNC: 8.5 IU/ML — SIGNIFICANT CHANGE UP

## 2017-11-26 PROCEDURE — 86850 RBC ANTIBODY SCREEN: CPT

## 2017-11-26 PROCEDURE — 81001 URINALYSIS AUTO W/SCOPE: CPT

## 2017-11-26 PROCEDURE — G0463: CPT

## 2017-11-26 PROCEDURE — 86780 TREPONEMA PALLIDUM: CPT

## 2017-11-26 PROCEDURE — 86480 TB TEST CELL IMMUN MEASURE: CPT

## 2017-11-26 PROCEDURE — 82803 BLOOD GASES ANY COMBINATION: CPT

## 2017-11-26 PROCEDURE — 86787 VARICELLA-ZOSTER ANTIBODY: CPT

## 2017-11-26 PROCEDURE — 82962 GLUCOSE BLOOD TEST: CPT

## 2017-11-26 PROCEDURE — 90686 IIV4 VACC NO PRSV 0.5 ML IM: CPT

## 2017-11-26 PROCEDURE — 80053 COMPREHEN METABOLIC PANEL: CPT

## 2017-11-26 PROCEDURE — 36415 COLL VENOUS BLD VENIPUNCTURE: CPT

## 2017-11-26 PROCEDURE — 86901 BLOOD TYPING SEROLOGIC RH(D): CPT

## 2017-11-26 PROCEDURE — 86900 BLOOD TYPING SEROLOGIC ABO: CPT

## 2017-11-26 PROCEDURE — 85027 COMPLETE CBC AUTOMATED: CPT

## 2017-11-26 PROCEDURE — 86762 RUBELLA ANTIBODY: CPT

## 2017-11-26 PROCEDURE — 59025 FETAL NON-STRESS TEST: CPT

## 2017-11-26 PROCEDURE — 87340 HEPATITIS B SURFACE AG IA: CPT

## 2017-11-26 RX ORDER — PHENYLEPHRINE-SHARK LIVER OIL-MINERAL OIL-PETROLATUM RECTAL OINTMENT
1 OINTMENT (GRAM) RECTAL
Qty: 0 | Refills: 0 | Status: DISCONTINUED | OUTPATIENT
Start: 2017-11-26 | End: 2017-11-26

## 2017-11-26 RX ORDER — HYDROCORTISONE ACETATE 1 %
1 OINTMENT (GRAM) RECTAL
Qty: 1 | Refills: 0
Start: 2017-11-26 | End: 2017-12-01

## 2017-11-26 RX ORDER — PSYLLIUM SEED (WITH DEXTROSE)
1 POWDER (GRAM) ORAL
Qty: 5 | Refills: 0
Start: 2017-11-26 | End: 2017-12-01

## 2017-11-26 RX ORDER — IBUPROFEN 200 MG
1 TABLET ORAL
Qty: 40 | Refills: 0
Start: 2017-11-26 | End: 2017-12-06

## 2017-11-26 RX ORDER — PSYLLIUM SEED (WITH DEXTROSE)
1 POWDER (GRAM) ORAL THREE TIMES A DAY
Qty: 0 | Refills: 0 | Status: DISCONTINUED | OUTPATIENT
Start: 2017-11-26 | End: 2017-11-26

## 2017-11-26 RX ORDER — DOCUSATE SODIUM 100 MG
1 CAPSULE ORAL
Qty: 28 | Refills: 0
Start: 2017-11-26 | End: 2017-12-10

## 2017-11-26 RX ADMIN — Medication 600 MILLIGRAM(S): at 01:30

## 2017-11-26 RX ADMIN — Medication 600 MILLIGRAM(S): at 00:34

## 2017-11-26 RX ADMIN — Medication 600 MILLIGRAM(S): at 08:51

## 2017-11-26 RX ADMIN — Medication 100 MILLIGRAM(S): at 08:52

## 2017-11-26 RX ADMIN — AER TRAVELER 1 APPLICATION(S): 0.5 SOLUTION RECTAL; TOPICAL at 08:53

## 2017-11-26 RX ADMIN — Medication 600 MILLIGRAM(S): at 09:30

## 2017-11-26 NOTE — PROGRESS NOTE ADULT - SUBJECTIVE AND OBJECTIVE BOX
31y year old  s/p  at 39wks gestation PPD#2.   Patient seen and examined at bedside, she had no acute overnight events.   Patient is ambulating, +eating, +PO hydration, +voiding, +Flatus, -BM, +Formula feeding and breastfeeding.   She continues to have pain in her rectum associated with her hemorrhoids. Vaginal bleeding has reduced, she currently denies any abdominal pain  Denies headache, SOB, fever, chills and calf pain.    Vital Signs Last 24 Hrs  T(C): 36.3 (2017 20:48), Max: 36.8 (2017 08:33)  T(F): 97.4 (2017 20:48), Max: 98.2 (2017 08:33)    AFEBRILE  HR: 88 (2017 20:48) (88 - 88)  BP: 127/78 (2017 20:48) (97/61 - 127/78)     NORMOTENSIVE  RR: 18 (2017 20:48) (18 - 18)    Physical Exam:  General: Adult female laying in bed in NAD  CVS: RRR, +S1/S2  Lungs: CTAB, no wheeze, ronchi or rales.   Breast: No tenderness or abnormal discharge.  Abdomen: +BS, soft, ND, minimally tender, Fundus firm at level of umbilicus.   Pelvic: Minimal lochia  Ext: No cyanosis, edema or calf tenderness.     Labs:  CBC Full  -  ( 2017 06:49 )  WBC Count : 11.7 K/uL  Hemoglobin : 10.2 g/dL     Normocytic Anemia  Hematocrit : 31.6 %  Platelet Count - Automated : 236 K/uL  Mean Cell Volume : 84.5 fl  Mean Cell Hemoglobin : 27.3 pg  Mean Cell Hemoglobin Concentration : 32.3 g/dL  Auto Neutrophil # : 8.1 K/uL  Auto Lymphocyte # : 2.8 K/uL  Auto Monocyte # : 0.7 K/uL  Auto Eosinophil # : 0.1 K/uL  Auto Basophil # : 0.0 K/uL  Auto Neutrophil % : 69.1 %  Auto Lymphocyte % : 23.9 %  Auto Monocyte % : 5.9 %  Auto Eosinophil % : 0.6 %  Auto Basophil % : 0.2 %                        10.2   11.7  )-----------( 236      ( 2017 06:49 )             31.6     MEDICATIONS  (STANDING):  diphtheria/tetanus/pertussis (acellular) Vaccine (ADAcel) 0.5 milliLiter(s) IntraMuscular once  influenza   Vaccine 0.5 milliLiter(s) IntraMuscular once  oxytocin Infusion 41.667 milliUNIT(s)/Min (125 mL/Hr) IV Continuous <Continuous>  oxytocin Infusion 333.333 milliUNIT(s)/Min (1000 mL/Hr) IV Continuous <Continuous>  prenatal multivitamin 1 Tablet(s) Oral daily  sodium chloride 0.9% lock flush 3 milliLiter(s) IV Push every 8 hours    MEDICATIONS  (PRN):  acetaminophen   Tablet 650 milliGRAM(s) Oral every 6 hours PRN For Temp greater than 38.5 C (101.3 F)  acetaminophen   Tablet. 650 milliGRAM(s) Oral every 6 hours PRN Mild Pain  dibucaine 1% Ointment 1 Application(s) Topical every 4 hours PRN Perineal Discomfort  diphenhydrAMINE   Capsule 25 milliGRAM(s) Oral every 6 hours PRN Itching  docusate sodium 100 milliGRAM(s) Oral two times a day PRN Stool Softening  glycerin Suppository - Adult 1 Suppository(s) Rectal at bedtime PRN Constipation  hydrocortisone 1% Cream 1 Application(s) Topical every 4 hours PRN Moderate to Severe Perineal Pain  ibuprofen  Tablet 600 milliGRAM(s) Oral every 6 hours PRN Moderate Pain  lanolin Ointment 1 Application(s) Topical every 6 hours PRN Sore Nipples  magnesium hydroxide Suspension 30 milliLiter(s) Oral two times a day PRN Constipation  oxyCODONE    5 mG/acetaminophen 325 mG 2 Tablet(s) Oral every 6 hours PRN Severe Pain  pramoxine 1%/zinc 5% Cream 1 Application(s) Topical every 4 hours PRN Moderate to Severe Perineal Pain  simethicone 80 milliGRAM(s) Chew every 6 hours PRN Gas  witch hazel Pads 1 Application(s) Topical every 4 hours PRN Perineal Discomfort

## 2017-11-26 NOTE — PROGRESS NOTE ADULT - PROBLEM SELECTOR PLAN 1
Patient afebrile, hemodynamically stable, in NAD, showing no signs/symptoms of dvt, pe, or infectious process;     2nd degree laceration intrapartum, repaired;  Continue current management  Encourage PO intake and hydration  Encourage mother/baby interaction and breastfeeding  Encourage ambulation  Patient likely discharged today;    Continue treatment of external hemorrhoids;  patient will follow up with ob on an outpatient basis;

## 2017-11-28 ENCOUNTER — APPOINTMENT (OUTPATIENT)
Dept: ANTEPARTUM | Facility: CLINIC | Age: 32
End: 2017-11-28

## 2019-07-31 ENCOUNTER — EMERGENCY (EMERGENCY)
Facility: HOSPITAL | Age: 34
LOS: 1 days | Discharge: HOME | End: 2019-07-31
Attending: EMERGENCY MEDICINE | Admitting: EMERGENCY MEDICINE
Payer: SELF-PAY

## 2019-07-31 VITALS
HEART RATE: 83 BPM | TEMPERATURE: 98 F | DIASTOLIC BLOOD PRESSURE: 84 MMHG | RESPIRATION RATE: 18 BRPM | HEIGHT: 65 IN | OXYGEN SATURATION: 97 % | SYSTOLIC BLOOD PRESSURE: 116 MMHG | WEIGHT: 113.1 LBS

## 2019-07-31 PROCEDURE — 96372 THER/PROPH/DIAG INJ SC/IM: CPT

## 2019-07-31 PROCEDURE — 99283 EMERGENCY DEPT VISIT LOW MDM: CPT | Mod: 25

## 2019-07-31 PROCEDURE — 71046 X-RAY EXAM CHEST 2 VIEWS: CPT | Mod: 26

## 2019-07-31 PROCEDURE — 71046 X-RAY EXAM CHEST 2 VIEWS: CPT

## 2019-07-31 PROCEDURE — 99284 EMERGENCY DEPT VISIT MOD MDM: CPT

## 2019-07-31 RX ORDER — KETOROLAC TROMETHAMINE 30 MG/ML
30 SYRINGE (ML) INJECTION ONCE
Refills: 0 | Status: DISCONTINUED | OUTPATIENT
Start: 2019-07-31 | End: 2019-07-31

## 2019-07-31 RX ADMIN — Medication 30 MILLIGRAM(S): at 09:59

## 2019-07-31 RX ADMIN — Medication 30 MILLIGRAM(S): at 09:19
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
## 2020-01-20 ENCOUNTER — APPOINTMENT (OUTPATIENT)
Dept: OBGYN | Facility: CLINIC | Age: 35
End: 2020-01-20
Payer: COMMERCIAL

## 2020-01-20 VITALS
HEART RATE: 78 BPM | DIASTOLIC BLOOD PRESSURE: 73 MMHG | HEIGHT: 66 IN | BODY MASS INDEX: 23.38 KG/M2 | SYSTOLIC BLOOD PRESSURE: 106 MMHG | WEIGHT: 145.5 LBS

## 2020-01-20 PROCEDURE — 81025 URINE PREGNANCY TEST: CPT

## 2020-01-20 PROCEDURE — 99204 OFFICE O/P NEW MOD 45 MIN: CPT

## 2020-01-20 PROCEDURE — 36415 COLL VENOUS BLD VENIPUNCTURE: CPT

## 2020-01-20 NOTE — REVIEW OF SYSTEMS
[Abdominal Pain] : abdominal pain [Pelvic Pain] : pelvic pain [Abn Vag Bleeding] : abnormal vaginal bleeding [Nl] : Integumentary

## 2020-01-20 NOTE — COUNSELING
[Exercise] : exercise [Nutrition] : nutrition [Breast Self Exam] : breast self exam [Vitamins/Supplements] : vitamins/supplements [STD (testing, results, tx)] : STD (testing, results, tx) [Safe Sexual Practices] : safe sexual practices

## 2020-01-20 NOTE — PHYSICAL EXAM
[Alert] : alert [Awake] : awake [Mass] : no breast mass [Acute Distress] : no acute distress [Axillary LAD] : no axillary lymphadenopathy [Nipple Discharge] : no nipple discharge [Tender] : non tender [Soft] : soft [Oriented x3] : oriented to person, place, and time [Normal] : cervix [No Bleeding] : there was no active vaginal bleeding [FreeTextEntry5] : bloodstained [Uterine Adnexae] : were not tender and not enlarged [FreeTextEntry4] : bloodstained

## 2020-01-21 LAB
ABO + RH PNL BLD: NORMAL
HCG SERPL QL: POSITIVE
HCG UR QL: POSITIVE
PAPP-A SERPL-ACNC: 257 MIU/ML
QUALITY CONTROL: YES

## 2020-01-22 LAB
C TRACH RRNA SPEC QL NAA+PROBE: NOT DETECTED
HPV HIGH+LOW RISK DNA PNL CVX: NOT DETECTED
N GONORRHOEA RRNA SPEC QL NAA+PROBE: NOT DETECTED
SOURCE TP AMPLIFICATION: NORMAL

## 2020-01-24 LAB — CYTOLOGY CVX/VAG DOC THIN PREP: NORMAL

## 2020-02-04 ENCOUNTER — APPOINTMENT (OUTPATIENT)
Dept: OBGYN | Facility: CLINIC | Age: 35
End: 2020-02-04
Payer: COMMERCIAL

## 2020-02-04 VITALS
BODY MASS INDEX: 22.86 KG/M2 | WEIGHT: 142.25 LBS | DIASTOLIC BLOOD PRESSURE: 60 MMHG | SYSTOLIC BLOOD PRESSURE: 100 MMHG | HEIGHT: 66 IN

## 2020-02-04 PROCEDURE — 99213 OFFICE O/P EST LOW 20 MIN: CPT

## 2020-02-04 PROCEDURE — 36415 COLL VENOUS BLD VENIPUNCTURE: CPT

## 2020-02-05 LAB
HCG SERPL QL: NEGATIVE
PAPP-A SERPL-ACNC: <1 MIU/ML

## 2020-02-18 ENCOUNTER — APPOINTMENT (OUTPATIENT)
Dept: OBGYN | Facility: CLINIC | Age: 35
End: 2020-02-18
Payer: COMMERCIAL

## 2020-02-18 VITALS
HEIGHT: 66 IN | SYSTOLIC BLOOD PRESSURE: 128 MMHG | DIASTOLIC BLOOD PRESSURE: 75 MMHG | WEIGHT: 142 LBS | BODY MASS INDEX: 22.82 KG/M2

## 2020-02-18 DIAGNOSIS — N76.0 ACUTE VAGINITIS: ICD-10-CM

## 2020-02-18 PROCEDURE — 99213 OFFICE O/P EST LOW 20 MIN: CPT

## 2020-06-17 ENCOUNTER — APPOINTMENT (OUTPATIENT)
Dept: OBGYN | Facility: CLINIC | Age: 35
End: 2020-06-17
Payer: COMMERCIAL

## 2020-06-17 VITALS
SYSTOLIC BLOOD PRESSURE: 118 MMHG | WEIGHT: 131.19 LBS | DIASTOLIC BLOOD PRESSURE: 76 MMHG | BODY MASS INDEX: 21.17 KG/M2

## 2020-06-17 PROCEDURE — 99214 OFFICE O/P EST MOD 30 MIN: CPT

## 2020-06-17 NOTE — PHYSICAL EXAM
[Normal] : cervix [Enlarged ___ wks] : enlarged [unfilled] ~Uweeks [Moderate] : there was moderate vaginal bleeding [Uterine Adnexae] : were not tender and not enlarged

## 2020-07-08 ENCOUNTER — APPOINTMENT (OUTPATIENT)
Dept: OBGYN | Facility: CLINIC | Age: 35
End: 2020-07-08
Payer: COMMERCIAL

## 2020-07-08 VITALS
HEIGHT: 66 IN | BODY MASS INDEX: 21.05 KG/M2 | WEIGHT: 131 LBS | DIASTOLIC BLOOD PRESSURE: 73 MMHG | SYSTOLIC BLOOD PRESSURE: 121 MMHG | HEART RATE: 84 BPM

## 2020-07-08 LAB
HCG UR QL: NEGATIVE
QUALITY CONTROL: YES

## 2020-07-08 PROCEDURE — 58558Z: CUSTOM

## 2020-07-08 NOTE — PROCEDURE
[Endometrial Biopsy] : Endometrial biopsy [Risks] : risks [Irregular Bleeding] : irregular uterine bleeding [Benefits] : benefits [Alternatives] : alternatives [Infection] : infection [Patient] : patient [Bleeding] : bleeding [Allergic Reaction] : allergic reaction [Uterine Perforation] : uterine perforation [CONSENT OBTAINED] : written consent was obtained prior to the procedure. [Pain] : pain [LMP ___] : LMP was [unfilled] [Neg Pregnancy Test] : a pregnancy test was negative [Betadine] : Betadine [Paracervical Block] : A paracervical block was performed using [1%] : 1% [Without Epi] : without epinephrine [Tenaculum] : a single toothed tenaculum [Sounded to ___ cm] : sounded to [unfilled] ~Ucm [Required Dilation] : required dilation [Mid Position] : mid position [Pipelle] : a Pipelle endometrial suction curette [Sent to Histology] : the specimen was place in buffered formalin and sent for pathlogy [Moderate] : a moderate [Tolerated Well] : the patient tolerated the procedure well [de-identified] : menometrorrhagia [No Complications] : there were no complications

## 2020-07-13 LAB — CORE LAB BIOPSY: NORMAL

## 2020-08-03 ENCOUNTER — APPOINTMENT (OUTPATIENT)
Dept: OBGYN | Facility: CLINIC | Age: 35
End: 2020-08-03
Payer: COMMERCIAL

## 2020-08-10 ENCOUNTER — APPOINTMENT (OUTPATIENT)
Dept: OBGYN | Facility: CLINIC | Age: 35
End: 2020-08-10
Payer: COMMERCIAL

## 2020-08-10 VITALS — WEIGHT: 138 LBS | DIASTOLIC BLOOD PRESSURE: 76 MMHG | BODY MASS INDEX: 22.27 KG/M2 | SYSTOLIC BLOOD PRESSURE: 112 MMHG

## 2020-08-10 DIAGNOSIS — Z00.00 ENCOUNTER FOR GENERAL ADULT MEDICAL EXAMINATION W/OUT ABNORMAL FINDINGS: ICD-10-CM

## 2020-08-10 PROCEDURE — 99213 OFFICE O/P EST LOW 20 MIN: CPT

## 2020-10-03 ENCOUNTER — EMERGENCY (EMERGENCY)
Facility: HOSPITAL | Age: 35
LOS: 1 days | Discharge: HOME | End: 2020-10-03
Attending: EMERGENCY MEDICINE
Payer: COMMERCIAL

## 2020-10-03 ENCOUNTER — RESULT REVIEW (OUTPATIENT)
Age: 35
End: 2020-10-03

## 2020-10-03 VITALS
HEART RATE: 98 BPM | DIASTOLIC BLOOD PRESSURE: 63 MMHG | OXYGEN SATURATION: 98 % | RESPIRATION RATE: 18 BRPM | SYSTOLIC BLOOD PRESSURE: 116 MMHG

## 2020-10-03 VITALS
HEART RATE: 114 BPM | DIASTOLIC BLOOD PRESSURE: 70 MMHG | WEIGHT: 130.07 LBS | SYSTOLIC BLOOD PRESSURE: 111 MMHG | TEMPERATURE: 99 F | RESPIRATION RATE: 20 BRPM | HEIGHT: 65 IN | OXYGEN SATURATION: 98 %

## 2020-10-03 DIAGNOSIS — O03.9 COMPLETE OR UNSPECIFIED SPONTANEOUS ABORTION WITHOUT COMPLICATION: ICD-10-CM

## 2020-10-03 LAB
ALBUMIN SERPL ELPH-MCNC: 4.4 G/DL — SIGNIFICANT CHANGE UP (ref 3.3–5.2)
ALP SERPL-CCNC: 52 U/L — SIGNIFICANT CHANGE UP (ref 40–120)
ALT FLD-CCNC: 12 U/L — SIGNIFICANT CHANGE UP
ANION GAP SERPL CALC-SCNC: 12 MMOL/L — SIGNIFICANT CHANGE UP (ref 5–17)
APPEARANCE UR: ABNORMAL
AST SERPL-CCNC: 15 U/L — SIGNIFICANT CHANGE UP
BACTERIA # UR AUTO: NEGATIVE — SIGNIFICANT CHANGE UP
BILIRUB SERPL-MCNC: 0.9 MG/DL — SIGNIFICANT CHANGE UP (ref 0.4–2)
BILIRUB UR-MCNC: NEGATIVE — SIGNIFICANT CHANGE UP
BLD GP AB SCN SERPL QL: SIGNIFICANT CHANGE UP
BUN SERPL-MCNC: 9 MG/DL — SIGNIFICANT CHANGE UP (ref 8–20)
CALCIUM SERPL-MCNC: 9.3 MG/DL — SIGNIFICANT CHANGE UP (ref 8.6–10.2)
CHLORIDE SERPL-SCNC: 100 MMOL/L — SIGNIFICANT CHANGE UP (ref 98–107)
CO2 SERPL-SCNC: 24 MMOL/L — SIGNIFICANT CHANGE UP (ref 22–29)
COLOR SPEC: ABNORMAL
CREAT SERPL-MCNC: 0.64 MG/DL — SIGNIFICANT CHANGE UP (ref 0.5–1.3)
DIFF PNL FLD: ABNORMAL
EPI CELLS # UR: NEGATIVE — SIGNIFICANT CHANGE UP
GLUCOSE SERPL-MCNC: 123 MG/DL — HIGH (ref 70–99)
GLUCOSE UR QL: NEGATIVE MG/DL — SIGNIFICANT CHANGE UP
HCG SERPL-ACNC: 4239 MIU/ML — HIGH
HCG UR QL: POSITIVE
HCT VFR BLD CALC: 37.4 % — SIGNIFICANT CHANGE UP (ref 34.5–45)
HGB BLD-MCNC: 11.8 G/DL — SIGNIFICANT CHANGE UP (ref 11.5–15.5)
KETONES UR-MCNC: NEGATIVE — SIGNIFICANT CHANGE UP
LEUKOCYTE ESTERASE UR-ACNC: NEGATIVE — SIGNIFICANT CHANGE UP
MCHC RBC-ENTMCNC: 27.6 PG — SIGNIFICANT CHANGE UP (ref 27–34)
MCHC RBC-ENTMCNC: 31.6 GM/DL — LOW (ref 32–36)
MCV RBC AUTO: 87.6 FL — SIGNIFICANT CHANGE UP (ref 80–100)
NITRITE UR-MCNC: NEGATIVE — SIGNIFICANT CHANGE UP
PH UR: 7 — SIGNIFICANT CHANGE UP (ref 5–8)
PLATELET # BLD AUTO: 336 K/UL — SIGNIFICANT CHANGE UP (ref 150–400)
POTASSIUM SERPL-MCNC: 3.7 MMOL/L — SIGNIFICANT CHANGE UP (ref 3.5–5.3)
POTASSIUM SERPL-SCNC: 3.7 MMOL/L — SIGNIFICANT CHANGE UP (ref 3.5–5.3)
PROT SERPL-MCNC: 7.3 G/DL — SIGNIFICANT CHANGE UP (ref 6.6–8.7)
PROT UR-MCNC: 500 MG/DL
RBC # BLD: 4.27 M/UL — SIGNIFICANT CHANGE UP (ref 3.8–5.2)
RBC # FLD: 11.8 % — SIGNIFICANT CHANGE UP (ref 10.3–14.5)
RBC CASTS # UR COMP ASSIST: SIGNIFICANT CHANGE UP /HPF (ref 0–4)
SODIUM SERPL-SCNC: 136 MMOL/L — SIGNIFICANT CHANGE UP (ref 135–145)
SP GR SPEC: 1.01 — SIGNIFICANT CHANGE UP (ref 1.01–1.02)
UROBILINOGEN FLD QL: NEGATIVE MG/DL — SIGNIFICANT CHANGE UP
WBC # BLD: 10.56 K/UL — HIGH (ref 3.8–10.5)
WBC # FLD AUTO: 10.56 K/UL — HIGH (ref 3.8–10.5)
WBC UR QL: SIGNIFICANT CHANGE UP

## 2020-10-03 PROCEDURE — 36415 COLL VENOUS BLD VENIPUNCTURE: CPT

## 2020-10-03 PROCEDURE — 76817 TRANSVAGINAL US OBSTETRIC: CPT | Mod: 26

## 2020-10-03 PROCEDURE — 84702 CHORIONIC GONADOTROPIN TEST: CPT

## 2020-10-03 PROCEDURE — 81001 URINALYSIS AUTO W/SCOPE: CPT

## 2020-10-03 PROCEDURE — 81025 URINE PREGNANCY TEST: CPT

## 2020-10-03 PROCEDURE — 99285 EMERGENCY DEPT VISIT HI MDM: CPT

## 2020-10-03 PROCEDURE — 76830 TRANSVAGINAL US NON-OB: CPT

## 2020-10-03 PROCEDURE — 99283 EMERGENCY DEPT VISIT LOW MDM: CPT

## 2020-10-03 PROCEDURE — 86901 BLOOD TYPING SEROLOGIC RH(D): CPT

## 2020-10-03 PROCEDURE — 86850 RBC ANTIBODY SCREEN: CPT

## 2020-10-03 PROCEDURE — 85027 COMPLETE CBC AUTOMATED: CPT

## 2020-10-03 PROCEDURE — 88305 TISSUE EXAM BY PATHOLOGIST: CPT | Mod: 26

## 2020-10-03 PROCEDURE — 88305 TISSUE EXAM BY PATHOLOGIST: CPT

## 2020-10-03 PROCEDURE — 76815 OB US LIMITED FETUS(S): CPT | Mod: 26

## 2020-10-03 PROCEDURE — 76817 TRANSVAGINAL US OBSTETRIC: CPT

## 2020-10-03 PROCEDURE — 99284 EMERGENCY DEPT VISIT MOD MDM: CPT | Mod: 25

## 2020-10-03 PROCEDURE — 86900 BLOOD TYPING SEROLOGIC ABO: CPT

## 2020-10-03 PROCEDURE — 80053 COMPREHEN METABOLIC PANEL: CPT

## 2020-10-03 PROCEDURE — 76815 OB US LIMITED FETUS(S): CPT

## 2020-10-03 RX ORDER — SODIUM CHLORIDE 9 MG/ML
1000 INJECTION INTRAMUSCULAR; INTRAVENOUS; SUBCUTANEOUS ONCE
Refills: 0 | Status: COMPLETED | OUTPATIENT
Start: 2020-10-03 | End: 2020-10-03

## 2020-10-03 RX ADMIN — SODIUM CHLORIDE 1000 MILLILITER(S): 9 INJECTION INTRAMUSCULAR; INTRAVENOUS; SUBCUTANEOUS at 13:06

## 2020-10-03 NOTE — CONSULT NOTE ADULT - ASSESSMENT
SURINDER ORTIZ is a 35 yo  LMP (2020) @ 6 weeks by 1st trimester sono who presents to ED for complaints of vaginal bleeding x 2 days. On presentation to the ED, patient was tachycardic to 111; however, normotensive and afebrile. Patient was non-toxic appearing, and routine labs/imaging was performed. CBC/CMP- WNL; however, [id number]. TVUS revealed "3.3 x 3.1 x 2.8 cm echogenic right adnexal mass likely representing involuting corpus luteum cyst and less likely an ectopic pregnancy" at which time GYN team consulted.    Patient seen and examined at bedside. Patient non-toxic appearing. Physical exam findings demonstrated minimal TTP along abdomen. Pelvic exam revealed a large blood clot present in vaginal vault that was removed with ring forced. Cervical  No tissues, POCs or active bleeding visualized. Given H&P, labs & sono, likely complete vs. incomplete AB. Patient counseled in regards to results of examination. Patient verbalized understanding at bedside.          PLAN:  - VSS  - No signs of active bleeding on exam  - Patient to follow up with Dr. Rojas on 10/5/2020 for repeat b-hCG  - Patient to take OTC tylenol for abdominal cramping pain.     Patient seen and plan discussed w/ Dr. Rojas
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
## 2020-10-03 NOTE — ED ADULT NURSE REASSESSMENT NOTE - REASSESS COMMUNICATION
ED physician notified/possible POC passed and caught in urine specimen cup by patient during urination, pathology specimen sent to lab.

## 2020-10-03 NOTE — ED STATDOCS - PROGRESS NOTE DETAILS
Patient seen by attending for vaginal bleeding, sono and labs ordered will follow up results and discuss dispo Positive HCG, patient c/o heavy bleeding since yesterday, no current cramping,   will follow up sono results Radiology called, no IUP, Right adnexa cyst can not r/o ectopic    Called OB resident will come evaluate OB saw patient, can be discharged and they will follow up on Monday in office

## 2020-10-03 NOTE — ED STATDOCS - OBJECTIVE STATEMENT
33 y/o  presents complaining of heavy vaginal bleeding with blood clots since yesterday morning. She denies associated cramping or pain. She is currently wearing a pull-up diaper due to the severity of the bleeding. She describes the clots as "chicken gizzards". She does not typically get heavy periods, LMP was 20, lasting 3 days. She is unsure if she is pregnant. She has felt a little dizzy during this time as well and nausea, but no vomiting. Prior to the bleeding starting she had some mild low back pain, but that has since resolved. She takes 81 mg ASA and PNV. Her last miscarriage was earlier this year (she cannot recall the month). Her living child is almost 3, born full term.  GYN: Oneybsantosh

## 2020-10-03 NOTE — CONSULT NOTE ADULT - SUBJECTIVE AND OBJECTIVE BOX
SURINDER ORTIZ is a 33 yo  LMP (2020) @ 6 weeks by 1st trimester sono who presents to ED for complaints of vaginal bleeding x 2 days.     Patient states that 2 days ago, she began noticing presence of dark brown spotting. States that over the course of the day, the spotting increased to a continuous bright red vaginal bleeding with passage of large clots. Describes the clots as dark red quarter to fist sized. Patient states that she soaked through > 8 pads over the course of the day, and eventually had to start wearing diapers because of the bleeding. States the bleeding is associated with mild lower abdominal cramping pain, but has not required any OTC pain meds for relief.   Patient denies h/o fibroids, adenomyosis or history of heavy VB and AUB. Patient denies chest pain, palpitations, shortness of breath, dizziness, lightheadedness, weakness, and feeling faint.Patient denies nausea, vomiting, fevers and chills. Patient denies dysuria, hematuria, back pain and urinary frequency. Patient is passing flatus and having formed bowel movements.    OB HISTORY:   -  x 1 (2017 @ Term- complicated by GDM)  - SAB x 2  - eTOP x 1    PAST GYN HISTORY: Denies history of abnormal paps, STDs, ovarian cysts, or uterine fibroids.   Menses qmonth  x 5days.       PMH:  - Anemia    SHX:  - D&C x 1    Allergies  No Known Allergies    MEDS:  - Iron      FAMILY HISTORY:  No pertinent family history in first degree relatives        Social Hx: denies tobacco use, drug use. Social drinker.     ROS:  As per HPI    PHYSICAL EXAM-  T(C): 37 (10-03-20 @ 12:21), Max: 37 (10-03-20 @ 12:21)  HR: 98 (10-03-20 @ 14:41) (89 - 114)  BP: 116/63 (10-03-20 @ 14:41) (111/70 - 116/63)  RR: 18 (10-03-20 @ 14:41) (18 - 20)  SpO2: 98% (10-03-20 @ 14:41) (98% - 98%)    CONSTITUTIONAL: well developed no apparent distress, alert, oriented x 3.  PULMONARY: Lungs clear to auscultation   CARDIOVASCULAR: RRR   ABDOMEN: soft, Minimal tenderness to palpation along LUQ. +BS, no guarding/rebound/rigidity    PELVIC:        EXTERNAL GENITALIA: atraumatic, no lesions        VAGINA: Large blood clot present in vaginal vault. No tissues or POCs visualized         CERVIX: Pink, closed. No active bleeding appreciated.        UTERUS: appropriate size        ADNEXA: not palpable                                                     11.8   10.56 )-----------( 336      ( 03 Oct 2020 13:03 )             37.4     10    136  |  100  |  9.0  ----------------------------<  123<H>  3.7   |  24.0  |  0.64    Ca    9.3      03 Oct 2020 13:03    TPro  7.3  /  Alb  4.4  /  TBili  0.9  /  DBili  x   /  AST  15  /  ALT  12  /  AlkPhos  52  10-03    SERUM bhcg    4239.0  10-03 @ 13:03      Radiology:  < from: US Transvaginal (10.03.20 @ 17:02) >     EXAM:  US OB EARLY FETUS(ES)                         EXAM:  US TRANSVAGINAL                          PROCEDURE DATE:  10/03/2020          INTERPRETATION:  CLINICAL INFORMATION: Heavy vaginal bleeding. Beta-hCG of 4239. Evaluate for intrauterine pregnancy or ectopic pregnancy.    LMP: 2020.    Estimated Gestational Age by LMP: 6 weeks.    COMPARISON: None available.    Endovaginal and transabdominal pelvic sonogram.    FINDINGS:  Uterus: The uterus is normal in size. There are no myometrial lesions.    The endometrial lining measures 4 mm. There is no evidence of intrauterine pregnancy.    Right ovary: There is 3.3 x 3.1 x 2.8 cm echogenic right adnexal mass with central cystic component containing debris. A separate right ovary is not visualized. This is likely to represent an ovarian corpus luteum cyst with associated hemorrhage rather then a ectopic pregnancy.  Left ovary: Not imaged. There are no large adnexal masses.    Fluid: Trace free fluid in the pelvis.    IMPRESSION:    Examination terminated prematurely secondary to pain.    Intrauterine pregnancy is not identified.    Neither ovary is clearly visualized. There is right adnexal mass likely representing involuting corpus luteum cyst and less likely an ectopic pregnancy. Consider further evaluation with contrast enhanced pelvic MRI.              SONIA SILVA M.D., ATTENDING RADIOLOGIST  This document has been electronically signed. Oct  3 2020  6:12PM    < end of copied text >

## 2020-10-03 NOTE — ED STATDOCS - CLINICAL SUMMARY MEDICAL DECISION MAKING FREE TEXT BOX
33 y/o  presents complaining of 2 days of heavy vaginal bleeding with dizziness, wearing pull-up diapers due to the level of bleeding. On exam she has no pain, no TTP, but is tachycardic. Will place on monitor, labs, sono, IV hydration. GYN consult as needed.

## 2020-10-03 NOTE — ED STATDOCS - PMH
No pertinent past medical history    No pertinent past medical history     <<----- Click to add NO pertinent Past Medical History

## 2020-10-03 NOTE — ED STATDOCS - ATTENDING CONTRIBUTION TO CARE
I, Silvana Frey, performed the initial face to face bedside interview with this patient regarding history of present illness, review of symptoms and relevant past medical, social and family history.  I completed an independent physical examination.  I was the initial provider who evaluated this patient. I have signed out the follow up of any pending tests (i.e. labs, radiological studies) to the ACP.  I have communicated the patient’s plan of care and disposition with the ACP.

## 2020-10-03 NOTE — ED ADULT TRIAGE NOTE - CHIEF COMPLAINT QUOTE
Pt arrives to ED c/o heavy  vaginal bleed since this morning , pt unsure if she is pregnant . Last menstrual period Sept 2 nd

## 2020-10-03 NOTE — ED ADULT NURSE NOTE - OBJECTIVE STATEMENT
pt with hx 3 miscarriages, 2 earlier this year c/o vaginal bleeding that began yesterday, today started passing very large clots described as "chicken gizzards". pt admits to feeling dizziness, wearing an adult pull-up to assist with the amount of clots and bleeding she is experiencing. as per pt this is not her normal type of menses. LMP sept 2nd.

## 2020-10-03 NOTE — ED STATDOCS - PATIENT PORTAL LINK FT
You can access the FollowMyHealth Patient Portal offered by [facility] by registering at the following website: [email or web address]. By joining [employer]’s FollowMyHealth portal, you will also be able to view your health information using other applications (apps) compatible with our system.

## 2020-10-03 NOTE — ED STATDOCS - NS ED ROS FT
Const: Denies fever, chills  HEENT: Denies blurry vision, sore throat  Neck: Denies neck pain/stiffness  Resp: Denies coughing, SOB  Cardiovascular: Denies CP, palpitations, LE edema  GI: Denies nausea, vomiting, abdominal pain, diarrhea, constipation, blood in stool  : + vaginal bleeding with clots. Denies urinary frequency/urgency/dysuria  MSK: Denies back pain  Neuro: Denies HA, dizziness, numbness, weakness  Skin: Denies rashes.

## 2020-10-07 LAB — SURGICAL PATHOLOGY STUDY: SIGNIFICANT CHANGE UP

## 2020-10-15 ENCOUNTER — APPOINTMENT (OUTPATIENT)
Dept: OBGYN | Facility: CLINIC | Age: 35
End: 2020-10-15
Payer: COMMERCIAL

## 2020-10-15 VITALS
SYSTOLIC BLOOD PRESSURE: 128 MMHG | HEIGHT: 66 IN | BODY MASS INDEX: 22.89 KG/M2 | WEIGHT: 142.44 LBS | HEART RATE: 80 BPM | DIASTOLIC BLOOD PRESSURE: 84 MMHG

## 2020-10-15 PROCEDURE — 99213 OFFICE O/P EST LOW 20 MIN: CPT

## 2020-10-15 NOTE — COUNSELING
[Body Image] : body image [Nutrition/ Exercise/ Weight Management] : nutrition, exercise, weight management [Vitamins/Supplements] : vitamins/supplements [Drugs/Alcohol] : drugs, alcohol [Sunscreen] : sunscreen [Bladder Hygiene] : bladder hygiene [Breast Self Exam] : breast self exam

## 2020-10-16 LAB
BASOPHILS # BLD AUTO: 0.05 K/UL
BASOPHILS NFR BLD AUTO: 0.7 %
EOSINOPHIL # BLD AUTO: 0.09 K/UL
EOSINOPHIL NFR BLD AUTO: 1.3 %
HCG SERPL QL: ABNORMAL
HCT VFR BLD CALC: 36.1 %
HGB BLD-MCNC: 10.9 G/DL
IMM GRANULOCYTES NFR BLD AUTO: 0.3 %
LYMPHOCYTES # BLD AUTO: 2.04 K/UL
LYMPHOCYTES NFR BLD AUTO: 30.2 %
MAN DIFF?: NORMAL
MCHC RBC-ENTMCNC: 27.9 PG
MCHC RBC-ENTMCNC: 30.2 GM/DL
MCV RBC AUTO: 92.3 FL
MONOCYTES # BLD AUTO: 0.46 K/UL
MONOCYTES NFR BLD AUTO: 6.8 %
NEUTROPHILS # BLD AUTO: 4.09 K/UL
NEUTROPHILS NFR BLD AUTO: 60.7 %
PAPP-A SERPL-ACNC: 17 MIU/ML
PLATELET # BLD AUTO: 282 K/UL
RBC # BLD: 3.91 M/UL
RBC # FLD: 13.1 %
WBC # FLD AUTO: 6.75 K/UL

## 2020-10-26 ENCOUNTER — APPOINTMENT (OUTPATIENT)
Dept: OBGYN | Facility: CLINIC | Age: 35
End: 2020-10-26
Payer: COMMERCIAL

## 2020-10-26 VITALS
SYSTOLIC BLOOD PRESSURE: 148 MMHG | HEIGHT: 66 IN | WEIGHT: 140.44 LBS | BODY MASS INDEX: 22.57 KG/M2 | DIASTOLIC BLOOD PRESSURE: 90 MMHG | HEART RATE: 85 BPM

## 2020-10-26 PROCEDURE — 99213 OFFICE O/P EST LOW 20 MIN: CPT

## 2020-10-26 PROCEDURE — 99072 ADDL SUPL MATRL&STAF TM PHE: CPT

## 2020-10-26 NOTE — COUNSELING
[Nutrition/ Exercise/ Weight Management] : nutrition, exercise, weight management [Body Image] : body image [Sunscreen] : sunscreen [Drugs/Alcohol] : drugs, alcohol [Breast Self Exam] : breast self exam [Bladder Hygiene] : bladder hygiene

## 2020-12-07 ENCOUNTER — APPOINTMENT (OUTPATIENT)
Dept: OBGYN | Facility: CLINIC | Age: 35
End: 2020-12-07

## 2020-12-14 ENCOUNTER — APPOINTMENT (OUTPATIENT)
Dept: INTERVENTIONAL RADIOLOGY/VASCULAR | Facility: CLINIC | Age: 35
End: 2020-12-14
Payer: COMMERCIAL

## 2020-12-14 ENCOUNTER — OUTPATIENT (OUTPATIENT)
Dept: OUTPATIENT SERVICES | Facility: HOSPITAL | Age: 35
LOS: 1 days | End: 2020-12-14

## 2020-12-14 DIAGNOSIS — O03.9 COMPLETE OR UNSPECIFIED SPONTANEOUS ABORTION WITHOUT COMPLICATION: ICD-10-CM

## 2020-12-14 PROCEDURE — 74740 X-RAY FEMALE GENITAL TRACT: CPT | Mod: 26

## 2020-12-14 PROCEDURE — 58340 CATHETER FOR HYSTEROGRAPHY: CPT

## 2020-12-23 PROBLEM — N76.0 ACUTE VAGINITIS: Status: RESOLVED | Noted: 2020-02-18 | Resolved: 2020-12-23

## 2021-02-12 ENCOUNTER — APPOINTMENT (OUTPATIENT)
Dept: OBGYN | Facility: CLINIC | Age: 36
End: 2021-02-12
Payer: COMMERCIAL

## 2021-02-12 VITALS
SYSTOLIC BLOOD PRESSURE: 130 MMHG | HEIGHT: 66 IN | WEIGHT: 140 LBS | BODY MASS INDEX: 22.5 KG/M2 | DIASTOLIC BLOOD PRESSURE: 70 MMHG

## 2021-02-12 PROCEDURE — 99213 OFFICE O/P EST LOW 20 MIN: CPT

## 2021-02-12 PROCEDURE — 99072 ADDL SUPL MATRL&STAF TM PHE: CPT

## 2021-02-16 DIAGNOSIS — O03.9 COMPLETE OR UNSPECIFIED SPONTANEOUS ABORTION W/OUT COMPLICATION: ICD-10-CM

## 2021-02-16 DIAGNOSIS — O24.419 GESTATIONAL DIABETES MELLITUS IN PREGNANCY, UNSPECIFIED CONTROL: ICD-10-CM

## 2021-03-12 ENCOUNTER — APPOINTMENT (OUTPATIENT)
Dept: OBGYN | Facility: CLINIC | Age: 36
End: 2021-03-12

## 2021-04-13 ENCOUNTER — NON-APPOINTMENT (OUTPATIENT)
Age: 36
End: 2021-04-13

## 2021-04-30 ENCOUNTER — APPOINTMENT (OUTPATIENT)
Dept: OBGYN | Facility: CLINIC | Age: 36
End: 2021-04-30

## 2021-07-30 NOTE — ED ADULT TRIAGE NOTE - PAIN: PRESENCE, MLM
I spoke to Rose Marie and we will call her Monday to see if we can get her in with another provider here at the clinic. As of right now there are no openings   complains of pain/discomfort

## 2021-08-09 ENCOUNTER — RX RENEWAL (OUTPATIENT)
Age: 36
End: 2021-08-09

## 2021-10-07 ENCOUNTER — APPOINTMENT (OUTPATIENT)
Dept: OBGYN | Facility: CLINIC | Age: 36
End: 2021-10-07
Payer: COMMERCIAL

## 2021-10-07 VITALS
HEIGHT: 66 IN | SYSTOLIC BLOOD PRESSURE: 110 MMHG | DIASTOLIC BLOOD PRESSURE: 70 MMHG | WEIGHT: 137 LBS | BODY MASS INDEX: 22.02 KG/M2

## 2021-10-07 LAB
HCG UR QL: POSITIVE
QUALITY CONTROL: YES

## 2021-10-07 PROCEDURE — 81025 URINE PREGNANCY TEST: CPT

## 2021-10-07 PROCEDURE — 99213 OFFICE O/P EST LOW 20 MIN: CPT

## 2021-10-13 LAB — CYTOLOGY CVX/VAG DOC THIN PREP: NORMAL

## 2021-10-21 ENCOUNTER — APPOINTMENT (OUTPATIENT)
Dept: OBGYN | Facility: CLINIC | Age: 36
End: 2021-10-21
Payer: COMMERCIAL

## 2021-10-21 VITALS
WEIGHT: 137 LBS | DIASTOLIC BLOOD PRESSURE: 76 MMHG | HEIGHT: 66 IN | BODY MASS INDEX: 22.02 KG/M2 | SYSTOLIC BLOOD PRESSURE: 114 MMHG

## 2021-10-21 PROCEDURE — 0501F PRENATAL FLOW SHEET: CPT

## 2021-10-25 ENCOUNTER — APPOINTMENT (OUTPATIENT)
Dept: OBGYN | Facility: CLINIC | Age: 36
End: 2021-10-25

## 2021-10-27 ENCOUNTER — NON-APPOINTMENT (OUTPATIENT)
Age: 36
End: 2021-10-27

## 2021-11-01 DIAGNOSIS — Z87.59 PERSONAL HISTORY OF OTHER COMPLICATIONS OF PREGNANCY, CHILDBIRTH AND THE PUERPERIUM: ICD-10-CM

## 2021-11-01 DIAGNOSIS — Z87.42 PERSONAL HISTORY OF OTHER DISEASES OF THE FEMALE GENITAL TRACT: ICD-10-CM

## 2021-11-01 DIAGNOSIS — N92.1 EXCESSIVE AND FREQUENT MENSTRUATION WITH IRREGULAR CYCLE: ICD-10-CM

## 2021-11-02 ENCOUNTER — EMERGENCY (EMERGENCY)
Facility: HOSPITAL | Age: 36
LOS: 1 days | Discharge: HOME | End: 2021-11-02
Attending: EMERGENCY MEDICINE
Payer: COMMERCIAL

## 2021-11-02 VITALS
DIASTOLIC BLOOD PRESSURE: 83 MMHG | SYSTOLIC BLOOD PRESSURE: 125 MMHG | OXYGEN SATURATION: 100 % | HEIGHT: 64 IN | RESPIRATION RATE: 18 BRPM | TEMPERATURE: 100 F | HEART RATE: 98 BPM | WEIGHT: 138.89 LBS

## 2021-11-02 LAB
ALBUMIN SERPL ELPH-MCNC: 4.3 G/DL — SIGNIFICANT CHANGE UP (ref 3.3–5.2)
ALP SERPL-CCNC: 50 U/L — SIGNIFICANT CHANGE UP (ref 40–120)
ALT FLD-CCNC: 13 U/L — SIGNIFICANT CHANGE UP
ANION GAP SERPL CALC-SCNC: 12 MMOL/L — SIGNIFICANT CHANGE UP (ref 5–17)
APPEARANCE UR: ABNORMAL
AST SERPL-CCNC: 17 U/L — SIGNIFICANT CHANGE UP
BACTERIA # UR AUTO: ABNORMAL
BASOPHILS # BLD AUTO: 0.06 K/UL — SIGNIFICANT CHANGE UP (ref 0–0.2)
BASOPHILS NFR BLD AUTO: 0.5 % — SIGNIFICANT CHANGE UP (ref 0–2)
BILIRUB SERPL-MCNC: 0.6 MG/DL — SIGNIFICANT CHANGE UP (ref 0.4–2)
BILIRUB UR-MCNC: NEGATIVE — SIGNIFICANT CHANGE UP
BLD GP AB SCN SERPL QL: SIGNIFICANT CHANGE UP
BUN SERPL-MCNC: 8.5 MG/DL — SIGNIFICANT CHANGE UP (ref 8–20)
CALCIUM SERPL-MCNC: 9.4 MG/DL — SIGNIFICANT CHANGE UP (ref 8.6–10.2)
CHLORIDE SERPL-SCNC: 106 MMOL/L — SIGNIFICANT CHANGE UP (ref 98–107)
CO2 SERPL-SCNC: 23 MMOL/L — SIGNIFICANT CHANGE UP (ref 22–29)
COLOR SPEC: ABNORMAL
CREAT SERPL-MCNC: 0.67 MG/DL — SIGNIFICANT CHANGE UP (ref 0.5–1.3)
DIFF PNL FLD: ABNORMAL
EOSINOPHIL # BLD AUTO: 0.08 K/UL — SIGNIFICANT CHANGE UP (ref 0–0.5)
EOSINOPHIL NFR BLD AUTO: 0.7 % — SIGNIFICANT CHANGE UP (ref 0–6)
EPI CELLS # UR: SIGNIFICANT CHANGE UP
GLUCOSE SERPL-MCNC: 94 MG/DL — SIGNIFICANT CHANGE UP (ref 70–99)
GLUCOSE UR QL: NEGATIVE MG/DL — SIGNIFICANT CHANGE UP
HCG SERPL-ACNC: 2701 MIU/ML — HIGH
HCT VFR BLD CALC: 37.3 % — SIGNIFICANT CHANGE UP (ref 34.5–45)
HGB BLD-MCNC: 11.6 G/DL — SIGNIFICANT CHANGE UP (ref 11.5–15.5)
IMM GRANULOCYTES NFR BLD AUTO: 0.3 % — SIGNIFICANT CHANGE UP (ref 0–1.5)
KETONES UR-MCNC: ABNORMAL
LEUKOCYTE ESTERASE UR-ACNC: ABNORMAL
LYMPHOCYTES # BLD AUTO: 3.39 K/UL — HIGH (ref 1–3.3)
LYMPHOCYTES # BLD AUTO: 31 % — SIGNIFICANT CHANGE UP (ref 13–44)
MCHC RBC-ENTMCNC: 27 PG — SIGNIFICANT CHANGE UP (ref 27–34)
MCHC RBC-ENTMCNC: 31.1 GM/DL — LOW (ref 32–36)
MCV RBC AUTO: 86.7 FL — SIGNIFICANT CHANGE UP (ref 80–100)
MONOCYTES # BLD AUTO: 0.73 K/UL — SIGNIFICANT CHANGE UP (ref 0–0.9)
MONOCYTES NFR BLD AUTO: 6.7 % — SIGNIFICANT CHANGE UP (ref 2–14)
NEUTROPHILS # BLD AUTO: 6.66 K/UL — SIGNIFICANT CHANGE UP (ref 1.8–7.4)
NEUTROPHILS NFR BLD AUTO: 60.8 % — SIGNIFICANT CHANGE UP (ref 43–77)
NITRITE UR-MCNC: NEGATIVE — SIGNIFICANT CHANGE UP
PH UR: 8 — SIGNIFICANT CHANGE UP (ref 5–8)
PLATELET # BLD AUTO: 314 K/UL — SIGNIFICANT CHANGE UP (ref 150–400)
POTASSIUM SERPL-MCNC: 4 MMOL/L — SIGNIFICANT CHANGE UP (ref 3.5–5.3)
POTASSIUM SERPL-SCNC: 4 MMOL/L — SIGNIFICANT CHANGE UP (ref 3.5–5.3)
PROT SERPL-MCNC: 7.5 G/DL — SIGNIFICANT CHANGE UP (ref 6.6–8.7)
PROT UR-MCNC: 100 MG/DL
RBC # BLD: 4.3 M/UL — SIGNIFICANT CHANGE UP (ref 3.8–5.2)
RBC # FLD: 12.5 % — SIGNIFICANT CHANGE UP (ref 10.3–14.5)
RBC CASTS # UR COMP ASSIST: >50 /HPF (ref 0–4)
SODIUM SERPL-SCNC: 141 MMOL/L — SIGNIFICANT CHANGE UP (ref 135–145)
SP GR SPEC: 1.01 — SIGNIFICANT CHANGE UP (ref 1.01–1.02)
UROBILINOGEN FLD QL: NEGATIVE MG/DL — SIGNIFICANT CHANGE UP
WBC # BLD: 10.95 K/UL — HIGH (ref 3.8–10.5)
WBC # FLD AUTO: 10.95 K/UL — HIGH (ref 3.8–10.5)
WBC UR QL: ABNORMAL

## 2021-11-02 PROCEDURE — 80053 COMPREHEN METABOLIC PANEL: CPT

## 2021-11-02 PROCEDURE — 76817 TRANSVAGINAL US OBSTETRIC: CPT | Mod: 26

## 2021-11-02 PROCEDURE — 81001 URINALYSIS AUTO W/SCOPE: CPT

## 2021-11-02 PROCEDURE — 76801 OB US < 14 WKS SINGLE FETUS: CPT

## 2021-11-02 PROCEDURE — 76801 OB US < 14 WKS SINGLE FETUS: CPT | Mod: 26

## 2021-11-02 PROCEDURE — 85025 COMPLETE CBC W/AUTO DIFF WBC: CPT

## 2021-11-02 PROCEDURE — 36415 COLL VENOUS BLD VENIPUNCTURE: CPT

## 2021-11-02 PROCEDURE — 99285 EMERGENCY DEPT VISIT HI MDM: CPT

## 2021-11-02 PROCEDURE — 76817 TRANSVAGINAL US OBSTETRIC: CPT

## 2021-11-02 PROCEDURE — 99284 EMERGENCY DEPT VISIT MOD MDM: CPT | Mod: 25

## 2021-11-02 PROCEDURE — 86901 BLOOD TYPING SEROLOGIC RH(D): CPT

## 2021-11-02 PROCEDURE — 84702 CHORIONIC GONADOTROPIN TEST: CPT

## 2021-11-02 PROCEDURE — 87086 URINE CULTURE/COLONY COUNT: CPT

## 2021-11-02 PROCEDURE — 86900 BLOOD TYPING SEROLOGIC ABO: CPT

## 2021-11-02 PROCEDURE — 86850 RBC ANTIBODY SCREEN: CPT

## 2021-11-02 RX ORDER — CEPHALEXIN 500 MG
1 CAPSULE ORAL
Qty: 28 | Refills: 0
Start: 2021-11-02 | End: 2021-11-08

## 2021-11-02 RX ORDER — CEPHALEXIN 500 MG
500 CAPSULE ORAL ONCE
Refills: 0 | Status: COMPLETED | OUTPATIENT
Start: 2021-11-02 | End: 2021-11-02

## 2021-11-02 RX ADMIN — Medication 500 MILLIGRAM(S): at 22:13

## 2021-11-02 NOTE — ED STATDOCS - NSFOLLOWUPINSTRUCTIONS_ED_ALL_ED_FT
please follow up with obgyn outpatiently  pt will need repeat blood work outpatiently in 3 days   return to the emergency department for any worsening symptoms

## 2021-11-02 NOTE — ED STATDOCS - WET READ LAUNCH FT
There are no Wet Read(s) to document. Bactrim Counseling:  I discussed with the patient the risks of sulfa antibiotics including but not limited to GI upset, allergic reaction, drug rash, diarrhea, dizziness, photosensitivity, and yeast infections.  Rarely, more serious reactions can occur including but not limited to aplastic anemia, agranulocytosis, methemoglobinemia, blood dyscrasias, liver or kidney failure, lung infiltrates or desquamative/blistering drug rashes.

## 2021-11-02 NOTE — ED STATDOCS - OBJECTIVE STATEMENT
35y F presents to ED c/o intermittent lower abdominal pressure & 2 episodes of vaginal bleeding at 10 this AM & again at 2:45 PM. Pt changed panty liner x1 & 1 maxi-pad, was not able to quantify how much blood in each pad. States blood is bright red with no clots. Describes lower abdominal pain as "pressure" that started at the same time as the bleeding, relieved with Tylenol, & denies pain right now. , currently 7 weeks pregnant, 1 living son that was born at term, & 3 miscarriages, had appointment with OBGYN 2 weeks ago & has next appointment scheduled for .

## 2021-11-02 NOTE — ED STATDOCS - ATTENDING CONTRIBUTION TO CARE
Pt. awake and alert. NO acute distress. Abdomen soft/NT. NO guarding or rebound. I, Dr. Lerma, performed a face to face bedside interview with this patient regarding history of present illness, review of symptoms and relevant past medical, social and family history.  I completed an independent physical examination.  I have also reviewed the student's note(s) and discussed the plan with the student.       I, Dr. Lerma, performed the initial face to face bedside interview with this patient regarding history of present illness, review of symptoms and relevant past medical, social and family history.  I completed an independent physical examination.  I was the initial provider who evaluated this patient. I have signed out the follow up of any pending tests (i.e. labs, radiological studies) to the ACP.  I have communicated the patient’s plan of care and disposition with the ACP.

## 2021-11-02 NOTE — ED ADULT NURSE NOTE - OBJECTIVE STATEMENT
assumed pt care as results waiting RN.  Pt states she is 7 weeks pregnant with mild bleeding and intermittent cramping.  Awaiting transport to ultrasound

## 2021-11-02 NOTE — ED STATDOCS - PATIENT PORTAL LINK FT
You can access the FollowMyHealth Patient Portal offered by [facility] by registering at the following website: [email or web address]. By joining [employer]’s FollowMyHealth portal, you will also be able to view your health information using other applications (apps) compatible with our system.

## 2021-11-02 NOTE — ED STATDOCS - CLINICAL SUMMARY MEDICAL DECISION MAKING FREE TEXT BOX
35y F  with 3 miscarriages & currently 7 weeks pregnant p/w intermittent lower abdominal pain & vaginal bleeding today (2 episodes). Pt is comfortable appearing with VS stable, denies dizziness, fatigue, or SOB. Will obtain CBC, CMP, & type & screen to determine if pt needs Rho Perri but low suspicion of significant hemorrhage. Will obtain transvaginal US to assess for ectopic pregnancy & to check fetus. Will obtain UA/UCx in the setting of current pregnancy & suprapubic tenderness.

## 2021-11-02 NOTE — ED ADULT TRIAGE NOTE - CHIEF COMPLAINT QUOTE
c/o low mid abdominal pain started today, denies NVD  denies any pain on urination, c/o vaginal bleeding states she is 7 weeks pregnant

## 2021-11-02 NOTE — ED STATDOCS - PROGRESS NOTE DETAILS
reviewed ultrasound results lab work and urine will tx for uti pt to follow up with obgyn outpatiently pt explained dc instructions

## 2021-11-03 ENCOUNTER — APPOINTMENT (OUTPATIENT)
Dept: OBGYN | Facility: CLINIC | Age: 36
End: 2021-11-03
Payer: COMMERCIAL

## 2021-11-03 VITALS
DIASTOLIC BLOOD PRESSURE: 70 MMHG | SYSTOLIC BLOOD PRESSURE: 120 MMHG | BODY MASS INDEX: 21.21 KG/M2 | HEIGHT: 66 IN | WEIGHT: 132 LBS

## 2021-11-03 LAB
APPEARANCE: CLEAR
BASOPHILS # BLD AUTO: 0.06 K/UL
BASOPHILS NFR BLD AUTO: 0.5 %
BILIRUBIN URINE: NEGATIVE
BLOOD URINE: NEGATIVE
COLOR: YELLOW
CULTURE RESULTS: SIGNIFICANT CHANGE UP
EOSINOPHIL # BLD AUTO: 0.08 K/UL
EOSINOPHIL NFR BLD AUTO: 0.7 %
ESTIMATED AVERAGE GLUCOSE: 100 MG/DL
GLUCOSE QUALITATIVE U: NEGATIVE
HBA1C MFR BLD HPLC: 5.1 %
HCT VFR BLD CALC: 36.9 %
HGB BLD-MCNC: 11.7 G/DL
IMM GRANULOCYTES NFR BLD AUTO: 0.3 %
KETONES URINE: NEGATIVE
LEUKOCYTE ESTERASE URINE: NEGATIVE
LYMPHOCYTES # BLD AUTO: 2.19 K/UL
LYMPHOCYTES NFR BLD AUTO: 19 %
MAN DIFF?: NORMAL
MCHC RBC-ENTMCNC: 27.7 PG
MCHC RBC-ENTMCNC: 31.7 GM/DL
MCV RBC AUTO: 87.4 FL
MONOCYTES # BLD AUTO: 0.57 K/UL
MONOCYTES NFR BLD AUTO: 4.9 %
NEUTROPHILS # BLD AUTO: 8.61 K/UL
NEUTROPHILS NFR BLD AUTO: 74.6 %
NITRITE URINE: NEGATIVE
PH URINE: 6
PLATELET # BLD AUTO: 334 K/UL
PROTEIN URINE: NORMAL
RBC # BLD: 4.22 M/UL
RBC # FLD: 12.5 %
SPECIFIC GRAVITY URINE: 1.02
SPECIMEN SOURCE: SIGNIFICANT CHANGE UP
UROBILINOGEN URINE: NORMAL
WBC # FLD AUTO: 11.55 K/UL

## 2021-11-03 PROCEDURE — 99213 OFFICE O/P EST LOW 20 MIN: CPT

## 2021-11-04 LAB
ABO + RH PNL BLD: NORMAL
ALBUMIN SERPL ELPH-MCNC: 4.4 G/DL
ALP BLD-CCNC: 52 U/L
ALT SERPL-CCNC: 12 U/L
ANION GAP SERPL CALC-SCNC: 15 MMOL/L
AST SERPL-CCNC: 13 U/L
BILIRUB SERPL-MCNC: 0.9 MG/DL
BLD GP AB SCN SERPL QL: NORMAL
BUN SERPL-MCNC: 7 MG/DL
CALCIUM SERPL-MCNC: 9.9 MG/DL
CHLORIDE SERPL-SCNC: 101 MMOL/L
CMV IGG SERPL QL: 2.2 U/ML
CMV IGG SERPL-IMP: POSITIVE
CMV IGM SERPL QL: <8 AU/ML
CMV IGM SERPL QL: NEGATIVE
CO2 SERPL-SCNC: 23 MMOL/L
CREAT SERPL-MCNC: 0.85 MG/DL
GLUCOSE SERPL-MCNC: 97 MG/DL
HBV SURFACE AG SER QL: NONREACTIVE
HCV AB SER QL: NONREACTIVE
HCV S/CO RATIO: 0.11 S/CO
HGB A MFR BLD: 96.8 %
HGB A2 MFR BLD: 2.9 %
HGB F MFR BLD: 0.3 %
HGB FRACT BLD-IMP: NORMAL
HIV1+2 AB SPEC QL IA.RAPID: NONREACTIVE
MEV IGG FLD QL IA: >300 AU/ML
MEV IGG+IGM SER-IMP: POSITIVE
MUV AB SER-ACNC: POSITIVE
MUV IGG SER QL IA: 95 AU/ML
POTASSIUM SERPL-SCNC: 4.3 MMOL/L
PROT SERPL-MCNC: 7 G/DL
RUBV IGG FLD-ACNC: 3.5 INDEX
RUBV IGG SER-IMP: POSITIVE
SODIUM SERPL-SCNC: 139 MMOL/L
T GONDII AB SER-IMP: NEGATIVE
T GONDII AB SER-IMP: POSITIVE
T GONDII IGG SER QL: 257 IU/ML
T GONDII IGM SER QL: 4.5 AU/ML
T PALLIDUM AB SER QL IA: NEGATIVE
TSH SERPL-ACNC: 1.13 UIU/ML
VZV AB TITR SER: POSITIVE
VZV IGG SER IF-ACNC: 501 INDEX

## 2021-11-06 ENCOUNTER — NON-APPOINTMENT (OUTPATIENT)
Age: 36
End: 2021-11-06

## 2021-11-06 LAB
B19V IGG SER QL IA: 3.1 INDEX
B19V IGG+IGM SER-IMP: NORMAL
B19V IGG+IGM SER-IMP: POSITIVE
B19V IGM FLD-ACNC: 0.07 INDEX
B19V IGM SER-ACNC: NEGATIVE
LEAD BLD-MCNC: <1 UG/DL
M TB IFN-G BLD-IMP: POSITIVE
QUANTIFERON TB PLUS MITOGEN MINUS NIL: 8.63 IU/ML
QUANTIFERON TB PLUS NIL: 0.03 IU/ML
QUANTIFERON TB PLUS TB1 MINUS NIL: 2.81 IU/ML
QUANTIFERON TB PLUS TB2 MINUS NIL: 1.19 IU/ML

## 2021-11-08 LAB — HCG SERPL-MCNC: 1314 MIU/ML

## 2021-11-10 LAB — FMR1 GENE MUT ANL BLD/T: NORMAL

## 2021-11-11 ENCOUNTER — APPOINTMENT (OUTPATIENT)
Dept: OBGYN | Facility: CLINIC | Age: 36
End: 2021-11-11
Payer: COMMERCIAL

## 2021-11-11 VITALS
BODY MASS INDEX: 22.53 KG/M2 | HEIGHT: 66 IN | DIASTOLIC BLOOD PRESSURE: 70 MMHG | SYSTOLIC BLOOD PRESSURE: 130 MMHG | WEIGHT: 140.19 LBS

## 2021-11-11 PROCEDURE — 99213 OFFICE O/P EST LOW 20 MIN: CPT

## 2021-11-12 LAB
AR GENE MUT ANL BLD/T: NORMAL
CFTR MUT TESTED BLD/T: NEGATIVE
HCG SERPL-MCNC: 21 MIU/ML

## 2021-11-22 ENCOUNTER — APPOINTMENT (OUTPATIENT)
Dept: MATERNAL FETAL MEDICINE | Facility: CLINIC | Age: 36
End: 2021-11-22

## 2021-11-22 ENCOUNTER — APPOINTMENT (OUTPATIENT)
Dept: OBGYN | Facility: CLINIC | Age: 36
End: 2021-11-22
Payer: COMMERCIAL

## 2021-11-22 VITALS
HEART RATE: 75 BPM | WEIGHT: 139.56 LBS | SYSTOLIC BLOOD PRESSURE: 132 MMHG | HEIGHT: 66 IN | DIASTOLIC BLOOD PRESSURE: 84 MMHG | BODY MASS INDEX: 22.43 KG/M2

## 2021-11-22 PROCEDURE — 99213 OFFICE O/P EST LOW 20 MIN: CPT

## 2021-11-23 LAB
HCG SERPL QL: NEGATIVE
PAPP-A SERPL-ACNC: 1 MIU/ML

## 2021-11-24 ENCOUNTER — NON-APPOINTMENT (OUTPATIENT)
Age: 36
End: 2021-11-24

## 2021-11-29 ENCOUNTER — APPOINTMENT (OUTPATIENT)
Dept: ANTEPARTUM | Facility: CLINIC | Age: 36
End: 2021-11-29

## 2021-12-06 ENCOUNTER — APPOINTMENT (OUTPATIENT)
Dept: OBGYN | Facility: CLINIC | Age: 36
End: 2021-12-06

## 2021-12-27 ENCOUNTER — APPOINTMENT (OUTPATIENT)
Dept: ANTEPARTUM | Facility: CLINIC | Age: 36
End: 2021-12-27

## 2021-12-29 ENCOUNTER — NON-APPOINTMENT (OUTPATIENT)
Age: 36
End: 2021-12-29

## 2021-12-29 DIAGNOSIS — O02.1 MISSED ABORTION: ICD-10-CM

## 2021-12-29 DIAGNOSIS — N93.9 ABNORMAL UTERINE AND VAGINAL BLEEDING, UNSPECIFIED: ICD-10-CM

## 2022-01-05 ENCOUNTER — APPOINTMENT (OUTPATIENT)
Dept: OBGYN | Facility: CLINIC | Age: 37
End: 2022-01-05
Payer: COMMERCIAL

## 2022-01-05 VITALS
WEIGHT: 144 LBS | DIASTOLIC BLOOD PRESSURE: 80 MMHG | SYSTOLIC BLOOD PRESSURE: 124 MMHG | BODY MASS INDEX: 23.14 KG/M2 | HEIGHT: 66 IN

## 2022-01-05 DIAGNOSIS — N76.0 ACUTE VAGINITIS: ICD-10-CM

## 2022-01-05 PROCEDURE — 99214 OFFICE O/P EST MOD 30 MIN: CPT

## 2022-01-05 RX ORDER — PRENATAL VIT NO.130/IRON/FOLIC 27MG-0.8MG
28-0.8 TABLET ORAL DAILY
Qty: 90 | Refills: 3 | Status: ACTIVE | COMMUNITY
Start: 2022-01-05 | End: 1900-01-01

## 2022-01-05 NOTE — PHYSICAL EXAM
[Vulvitis] : vulvitis [Labia Majora] : normal [Labia Minora] : normal [Discharge] : discharge [Scant] : scant [Foul Smelling] : not foul smelling [Cody] : yellow [Watery] : watery [Purulent] : purulent [Normal] : normal [Uterine Adnexae] : normal

## 2022-01-06 ENCOUNTER — TRANSCRIPTION ENCOUNTER (OUTPATIENT)
Age: 37
End: 2022-01-06

## 2022-01-24 ENCOUNTER — NON-APPOINTMENT (OUTPATIENT)
Age: 37
End: 2022-01-24

## 2022-01-24 ENCOUNTER — ASOB RESULT (OUTPATIENT)
Age: 37
End: 2022-01-24

## 2022-01-24 ENCOUNTER — APPOINTMENT (OUTPATIENT)
Dept: ANTEPARTUM | Facility: CLINIC | Age: 37
End: 2022-01-24

## 2022-01-24 ENCOUNTER — APPOINTMENT (OUTPATIENT)
Dept: MATERNAL FETAL MEDICINE | Facility: CLINIC | Age: 37
End: 2022-01-24
Payer: COMMERCIAL

## 2022-01-24 PROCEDURE — 99202 OFFICE O/P NEW SF 15 MIN: CPT | Mod: 95

## 2022-02-01 ENCOUNTER — NON-APPOINTMENT (OUTPATIENT)
Age: 37
End: 2022-02-01

## 2022-02-17 ENCOUNTER — RESULT REVIEW (OUTPATIENT)
Age: 37
End: 2022-02-17

## 2022-02-17 ENCOUNTER — OUTPATIENT (OUTPATIENT)
Dept: OUTPATIENT SERVICES | Facility: HOSPITAL | Age: 37
LOS: 1 days | End: 2022-02-17
Payer: COMMERCIAL

## 2022-02-17 ENCOUNTER — APPOINTMENT (OUTPATIENT)
Dept: RADIOLOGY | Facility: HOSPITAL | Age: 37
End: 2022-02-17

## 2022-02-17 DIAGNOSIS — R76.11 NONSPECIFIC REACTION TO TUBERCULIN SKIN TEST WITHOUT ACTIVE TUBERCULOSIS: ICD-10-CM

## 2022-02-17 PROCEDURE — 71046 X-RAY EXAM CHEST 2 VIEWS: CPT | Mod: 26

## 2022-03-24 ENCOUNTER — APPOINTMENT (OUTPATIENT)
Dept: OBGYN | Facility: CLINIC | Age: 37
End: 2022-03-24
Payer: COMMERCIAL

## 2022-03-24 VITALS
BODY MASS INDEX: 23.9 KG/M2 | DIASTOLIC BLOOD PRESSURE: 78 MMHG | WEIGHT: 140 LBS | HEIGHT: 64 IN | SYSTOLIC BLOOD PRESSURE: 119 MMHG

## 2022-03-24 PROCEDURE — 99213 OFFICE O/P EST LOW 20 MIN: CPT

## 2022-03-24 NOTE — PHYSICAL EXAM
[Chaperone Present] : A chaperone was present in the examining room during all aspects of the physical examination [FreeTextEntry1] : crystal [Appropriately responsive] : appropriately responsive [Alert] : alert [No Acute Distress] : no acute distress [No Lymphadenopathy] : no lymphadenopathy [Regular Rate Rhythm] : regular rate rhythm [No Murmurs] : no murmurs [Clear to Auscultation B/L] : clear to auscultation bilaterally [Soft] : soft [Non-tender] : non-tender [Non-distended] : non-distended [No HSM] : No HSM [No Lesions] : no lesions [No Mass] : no mass [Oriented x3] : oriented x3 [Examination Of The Breasts] : a normal appearance [No Masses] : no breast masses were palpable [Labia Majora] : normal [Labia Minora] : normal [Normal] : normal [Enlarged ___ wks] : enlarged [unfilled] ~Uweeks [Uterine Adnexae] : normal

## 2022-03-28 LAB
C TRACH RRNA SPEC QL NAA+PROBE: NOT DETECTED
N GONORRHOEA RRNA SPEC QL NAA+PROBE: NOT DETECTED
SOURCE AMPLIFICATION: NORMAL

## 2022-03-29 ENCOUNTER — APPOINTMENT (OUTPATIENT)
Dept: ANTEPARTUM | Facility: CLINIC | Age: 37
End: 2022-03-29

## 2022-04-01 ENCOUNTER — APPOINTMENT (OUTPATIENT)
Dept: ANTEPARTUM | Facility: CLINIC | Age: 37
End: 2022-04-01

## 2022-04-06 ENCOUNTER — NON-APPOINTMENT (OUTPATIENT)
Age: 37
End: 2022-04-06

## 2022-04-06 ENCOUNTER — ASOB RESULT (OUTPATIENT)
Age: 37
End: 2022-04-06

## 2022-04-06 ENCOUNTER — APPOINTMENT (OUTPATIENT)
Dept: ANTEPARTUM | Facility: CLINIC | Age: 37
End: 2022-04-06
Payer: COMMERCIAL

## 2022-04-06 PROCEDURE — 76801 OB US < 14 WKS SINGLE FETUS: CPT

## 2022-04-07 ENCOUNTER — NON-APPOINTMENT (OUTPATIENT)
Age: 37
End: 2022-04-07

## 2022-04-08 ENCOUNTER — NON-APPOINTMENT (OUTPATIENT)
Age: 37
End: 2022-04-08

## 2022-04-08 ENCOUNTER — APPOINTMENT (OUTPATIENT)
Dept: OBGYN | Facility: CLINIC | Age: 37
End: 2022-04-08
Payer: COMMERCIAL

## 2022-04-08 VITALS
HEIGHT: 64 IN | SYSTOLIC BLOOD PRESSURE: 119 MMHG | WEIGHT: 146 LBS | BODY MASS INDEX: 24.92 KG/M2 | DIASTOLIC BLOOD PRESSURE: 76 MMHG

## 2022-04-08 DIAGNOSIS — Z87.59 PERSONAL HISTORY OF OTHER COMPLICATIONS OF PREGNANCY, CHILDBIRTH AND THE PUERPERIUM: ICD-10-CM

## 2022-04-08 PROCEDURE — 0501F PRENATAL FLOW SHEET: CPT

## 2022-04-08 RX ORDER — FLUCONAZOLE 150 MG/1
150 TABLET ORAL
Qty: 1 | Refills: 3 | Status: COMPLETED | COMMUNITY
Start: 2020-02-18 | End: 2022-04-08

## 2022-04-08 RX ORDER — PRENATAL VIT NO.130/IRON/FOLIC 27MG-0.8MG
28-0.8 TABLET ORAL DAILY
Qty: 90 | Refills: 0 | Status: COMPLETED | COMMUNITY
Start: 2020-08-10 | End: 2022-04-08

## 2022-04-08 RX ORDER — FERROUS SULFATE 325(65) MG
325 (65 FE) TABLET ORAL DAILY
Refills: 0 | Status: COMPLETED | COMMUNITY
Start: 2017-11-14 | End: 2022-04-08

## 2022-04-08 RX ORDER — CLOMIPHENE CITRATE 50 MG/1
50 TABLET ORAL DAILY
Qty: 14 | Refills: 6 | Status: COMPLETED | COMMUNITY
Start: 2021-02-12 | End: 2022-04-08

## 2022-04-08 RX ORDER — FLUCONAZOLE 150 MG/1
150 TABLET ORAL
Qty: 1 | Refills: 2 | Status: COMPLETED | COMMUNITY
Start: 2022-01-05 | End: 2022-04-08

## 2022-04-08 RX ORDER — METRONIDAZOLE 7.5 MG/G
0.75 GEL VAGINAL
Qty: 1 | Refills: 5 | Status: COMPLETED | COMMUNITY
Start: 2020-02-18 | End: 2022-04-08

## 2022-04-08 RX ORDER — FERROUS FUMARATE/ASCORBIC ACID 65MG-25 MG
65-25 TABLET, EXTENDED RELEASE ORAL
Qty: 180 | Refills: 2 | Status: COMPLETED | COMMUNITY
Start: 2020-06-17 | End: 2022-04-08

## 2022-04-08 RX ORDER — CLOTRIMAZOLE AND BETAMETHASONE DIPROPIONATE 10; .5 MG/G; MG/G
1-0.05 CREAM TOPICAL 3 TIMES DAILY
Qty: 1 | Refills: 2 | Status: COMPLETED | COMMUNITY
Start: 2022-01-05 | End: 2022-04-08

## 2022-04-08 RX ORDER — PRENATAL VIT NO.130/IRON/FOLIC 27MG-0.8MG
28-0.8 TABLET ORAL DAILY
Qty: 90 | Refills: 3 | Status: COMPLETED | COMMUNITY
Start: 2021-10-07 | End: 2022-04-08

## 2022-04-09 LAB
ALBUMIN SERPL ELPH-MCNC: 4.1 G/DL
ALP BLD-CCNC: 57 U/L
ALT SERPL-CCNC: 8 U/L
ANION GAP SERPL CALC-SCNC: 17 MMOL/L
APPEARANCE: CLEAR
AST SERPL-CCNC: 13 U/L
BASOPHILS # BLD AUTO: 0.05 K/UL
BASOPHILS NFR BLD AUTO: 0.4 %
BILIRUB SERPL-MCNC: 0.4 MG/DL
BILIRUBIN URINE: NEGATIVE
BLOOD URINE: NEGATIVE
BUN SERPL-MCNC: 13 MG/DL
CALCIUM SERPL-MCNC: 9.8 MG/DL
CHLORIDE SERPL-SCNC: 100 MMOL/L
CMV IGG SERPL QL: 2.2 U/ML
CMV IGG SERPL-IMP: POSITIVE
CMV IGM SERPL QL: <8 AU/ML
CMV IGM SERPL QL: NEGATIVE
CO2 SERPL-SCNC: 21 MMOL/L
COLOR: NORMAL
CREAT SERPL-MCNC: 0.6 MG/DL
EGFR: 118 ML/MIN/1.73M2
EOSINOPHIL # BLD AUTO: 0.12 K/UL
EOSINOPHIL NFR BLD AUTO: 1.1 %
ESTIMATED AVERAGE GLUCOSE: 103 MG/DL
GLUCOSE QUALITATIVE U: NEGATIVE
GLUCOSE SERPL-MCNC: 32 MG/DL
HBA1C MFR BLD HPLC: 5.2 %
HBV SURFACE AG SER QL: NONREACTIVE
HCT VFR BLD CALC: 36.3 %
HCV AB SER QL: NONREACTIVE
HCV S/CO RATIO: 0.08 S/CO
HGB BLD-MCNC: 11.2 G/DL
HIV1+2 AB SPEC QL IA.RAPID: NONREACTIVE
IMM GRANULOCYTES NFR BLD AUTO: 0.3 %
KETONES URINE: NEGATIVE
LEUKOCYTE ESTERASE URINE: NEGATIVE
LYMPHOCYTES # BLD AUTO: 2.94 K/UL
LYMPHOCYTES NFR BLD AUTO: 26.4 %
MAN DIFF?: NORMAL
MCHC RBC-ENTMCNC: 27.5 PG
MCHC RBC-ENTMCNC: 30.9 GM/DL
MCV RBC AUTO: 89 FL
MEV IGG FLD QL IA: >300 AU/ML
MEV IGG+IGM SER-IMP: POSITIVE
MONOCYTES # BLD AUTO: 0.57 K/UL
MONOCYTES NFR BLD AUTO: 5.1 %
MUV AB SER-ACNC: POSITIVE
MUV IGG SER QL IA: 74.8 AU/ML
NEUTROPHILS # BLD AUTO: 7.44 K/UL
NEUTROPHILS NFR BLD AUTO: 66.7 %
NITRITE URINE: NEGATIVE
PH URINE: 6.5
PLATELET # BLD AUTO: 338 K/UL
POTASSIUM SERPL-SCNC: 4 MMOL/L
PROT SERPL-MCNC: 6.7 G/DL
PROTEIN URINE: NEGATIVE
RBC # BLD: 4.08 M/UL
RBC # FLD: 12.9 %
RUBV IGG FLD-ACNC: 2.8 INDEX
RUBV IGG SER-IMP: POSITIVE
SODIUM SERPL-SCNC: 138 MMOL/L
SPECIFIC GRAVITY URINE: 1.03
T GONDII AB SER-IMP: NEGATIVE
T GONDII AB SER-IMP: POSITIVE
T GONDII IGG SER QL: 196 IU/ML
T GONDII IGM SER QL: 4.1 AU/ML
T PALLIDUM AB SER QL IA: NEGATIVE
TSH SERPL-ACNC: 0.39 UIU/ML
UROBILINOGEN URINE: NORMAL
VZV AB TITR SER: POSITIVE
VZV IGG SER IF-ACNC: 485.9 INDEX
WBC # FLD AUTO: 11.15 K/UL

## 2022-04-11 ENCOUNTER — NON-APPOINTMENT (OUTPATIENT)
Age: 37
End: 2022-04-11

## 2022-04-12 LAB
ABO + RH PNL BLD: NORMAL
B19V IGG SER QL IA: 1.45 INDEX
B19V IGG+IGM SER-IMP: NORMAL
B19V IGG+IGM SER-IMP: POSITIVE
B19V IGM FLD-ACNC: 0.09 INDEX
B19V IGM SER-ACNC: NEGATIVE
BLD GP AB SCN SERPL QL: NORMAL
HGB A MFR BLD: 96.3 %
HGB A2 MFR BLD: 2.9 %
HGB F MFR BLD: 0.8 %
HGB FRACT BLD-IMP: NORMAL
LEAD BLD-MCNC: <1 UG/DL
M TB IFN-G BLD-IMP: POSITIVE
QUANTIFERON TB PLUS MITOGEN MINUS NIL: 9.95 IU/ML
QUANTIFERON TB PLUS NIL: 0.05 IU/ML
QUANTIFERON TB PLUS TB1 MINUS NIL: 5.27 IU/ML
QUANTIFERON TB PLUS TB2 MINUS NIL: 4.54 IU/ML

## 2022-04-13 ENCOUNTER — APPOINTMENT (OUTPATIENT)
Dept: ANTEPARTUM | Facility: CLINIC | Age: 37
End: 2022-04-13
Payer: COMMERCIAL

## 2022-04-13 ENCOUNTER — ASOB RESULT (OUTPATIENT)
Age: 37
End: 2022-04-13

## 2022-04-13 PROCEDURE — 36416 COLLJ CAPILLARY BLOOD SPEC: CPT

## 2022-04-13 PROCEDURE — 76813 OB US NUCHAL MEAS 1 GEST: CPT

## 2022-04-13 PROCEDURE — ZZZZZ: CPT

## 2022-04-14 LAB
AR GENE MUT ANL BLD/T: NORMAL
FMR1 GENE MUT ANL BLD/T: NORMAL

## 2022-04-15 ENCOUNTER — TRANSCRIPTION ENCOUNTER (OUTPATIENT)
Age: 37
End: 2022-04-15

## 2022-04-15 ENCOUNTER — APPOINTMENT (OUTPATIENT)
Dept: MATERNAL FETAL MEDICINE | Facility: CLINIC | Age: 37
End: 2022-04-15

## 2022-04-15 ENCOUNTER — NON-APPOINTMENT (OUTPATIENT)
Age: 37
End: 2022-04-15

## 2022-04-15 LAB — CFTR MUT TESTED BLD/T: NEGATIVE

## 2022-04-18 LAB
1ST TRIMESTER DATA: NORMAL
ADDENDUM DOC: NORMAL
AFP PNL SERPL: NORMAL
AFP SERPL-ACNC: NORMAL
CLINICAL BIOCHEMIST REVIEW: NORMAL
FREE BETA HCG 1ST TRIMESTER: NORMAL
Lab: NORMAL
NASAL BONE: PRESENT
NOTES NTD: NORMAL
NT: NORMAL
PAPP-A SERPL-ACNC: NORMAL
TRISOMY 18/3: NORMAL

## 2022-04-19 ENCOUNTER — APPOINTMENT (OUTPATIENT)
Dept: OBGYN | Facility: CLINIC | Age: 37
End: 2022-04-19
Payer: COMMERCIAL

## 2022-04-19 VITALS
DIASTOLIC BLOOD PRESSURE: 77 MMHG | HEIGHT: 64 IN | SYSTOLIC BLOOD PRESSURE: 124 MMHG | WEIGHT: 145.19 LBS | BODY MASS INDEX: 24.79 KG/M2

## 2022-04-19 PROCEDURE — 0502F SUBSEQUENT PRENATAL CARE: CPT

## 2022-04-20 LAB
APPEARANCE: CLEAR
BACTERIA: NEGATIVE
BILIRUBIN URINE: NEGATIVE
BLOOD URINE: NEGATIVE
CALCIUM OXALATE CRYSTALS: ABNORMAL
COLOR: NORMAL
GLUCOSE QUALITATIVE U: NEGATIVE
HYALINE CASTS: 1 /LPF
KETONES URINE: NEGATIVE
LEUKOCYTE ESTERASE URINE: ABNORMAL
MICROSCOPIC-UA: NORMAL
NITRITE URINE: NEGATIVE
PH URINE: 8
PROTEIN URINE: NEGATIVE
RED BLOOD CELLS URINE: 0 /HPF
SPECIFIC GRAVITY URINE: 1.01
SQUAMOUS EPITHELIAL CELLS: 4 /HPF
UROBILINOGEN URINE: NORMAL
WHITE BLOOD CELLS URINE: 3 /HPF

## 2022-04-21 LAB — BACTERIA UR CULT: NORMAL

## 2022-04-27 ENCOUNTER — APPOINTMENT (OUTPATIENT)
Dept: MATERNAL FETAL MEDICINE | Facility: CLINIC | Age: 37
End: 2022-04-27

## 2022-04-27 ENCOUNTER — TRANSCRIPTION ENCOUNTER (OUTPATIENT)
Age: 37
End: 2022-04-27

## 2022-04-29 ENCOUNTER — APPOINTMENT (OUTPATIENT)
Dept: MATERNAL FETAL MEDICINE | Facility: CLINIC | Age: 37
End: 2022-04-29
Payer: COMMERCIAL

## 2022-04-29 ENCOUNTER — ASOB RESULT (OUTPATIENT)
Age: 37
End: 2022-04-29

## 2022-04-29 PROCEDURE — 99202 OFFICE O/P NEW SF 15 MIN: CPT | Mod: 95

## 2022-05-02 ENCOUNTER — APPOINTMENT (OUTPATIENT)
Dept: OBGYN | Facility: CLINIC | Age: 37
End: 2022-05-02

## 2022-05-09 ENCOUNTER — NON-APPOINTMENT (OUTPATIENT)
Age: 37
End: 2022-05-09

## 2022-05-10 ENCOUNTER — APPOINTMENT (OUTPATIENT)
Dept: OBGYN | Facility: CLINIC | Age: 37
End: 2022-05-10
Payer: COMMERCIAL

## 2022-05-10 VITALS
BODY MASS INDEX: 25.48 KG/M2 | DIASTOLIC BLOOD PRESSURE: 70 MMHG | WEIGHT: 149.25 LBS | HEIGHT: 64 IN | SYSTOLIC BLOOD PRESSURE: 120 MMHG

## 2022-05-10 PROCEDURE — 0502F SUBSEQUENT PRENATAL CARE: CPT

## 2022-05-11 ENCOUNTER — NON-APPOINTMENT (OUTPATIENT)
Age: 37
End: 2022-05-11

## 2022-05-11 ENCOUNTER — APPOINTMENT (OUTPATIENT)
Dept: ANTEPARTUM | Facility: CLINIC | Age: 37
End: 2022-05-11
Payer: COMMERCIAL

## 2022-05-11 ENCOUNTER — APPOINTMENT (OUTPATIENT)
Dept: MATERNAL FETAL MEDICINE | Facility: CLINIC | Age: 37
End: 2022-05-11
Payer: COMMERCIAL

## 2022-05-11 VITALS
HEART RATE: 98 BPM | WEIGHT: 148 LBS | HEIGHT: 64 IN | BODY MASS INDEX: 25.27 KG/M2 | RESPIRATION RATE: 16 BRPM | DIASTOLIC BLOOD PRESSURE: 72 MMHG | OXYGEN SATURATION: 98 % | SYSTOLIC BLOOD PRESSURE: 112 MMHG

## 2022-05-11 DIAGNOSIS — Z83.3 FAMILY HISTORY OF DIABETES MELLITUS: ICD-10-CM

## 2022-05-11 DIAGNOSIS — O35.1XX0 MATERNAL CARE FOR (SUSPECTED) CHROMOSOMAL ABNORMALITY IN FETUS, NOT APPLICABLE OR UNSPECIFIED: ICD-10-CM

## 2022-05-11 PROCEDURE — 36415 COLL VENOUS BLD VENIPUNCTURE: CPT

## 2022-05-11 PROCEDURE — 99215 OFFICE O/P EST HI 40 MIN: CPT

## 2022-05-12 PROBLEM — O35.1XX0 ANEUPLOIDY IN FETUS AFFECTING MANAGEMENT OF MOTHER: Status: RESOLVED | Noted: 2022-05-11 | Resolved: 2022-05-12

## 2022-05-12 NOTE — DISCUSSION/SUMMARY
[FreeTextEntry1] : We had the pleasure of seeing your patient, Justyna Pina, for a Maternal-Fetal Medicine consultation today. She is a 36 year-old  at 16 1/7 weeks of gestation. Her pregnancy is complicated by advanced maternal age, recurrent pregnancy loss, anxiety, and low LUANN-A.\par \par She has no acute complaints today. Denies cramping, leaking, and vaginal bleeding. \par \par She was extensively counseled regarding the following issues:\par \par •	Advanced Maternal Age (AMA)\par \par Women who are of advanced maternal age (typically defined as age 35 at delivery) are at an increased risk for fetal aneuploidy, spontaneous , congenital malformations, diabetes, hypertensive disorders of pregnancy,  delivery, stillbirth, and low birth weight neonates. She had genetic counseling to review and discuss invasive and non-invasive options to detect aneuploidy. These options include: first trimester risk-assessment, sequential screening, non-invasive prenatal screening (NIPS), amniocentesis, and anatomical ultrasound at 20 weeks. NIPS declined.\par \par •	Low ULANN-A \par \par Low first trimester LUANN-A is associated with an increased risk for third trimester pregnancy complications such as fetal growth restriction and/or preeclampsia. Serial fetal growth studies with Doppler of the umbilical artery, as well as close maternal surveillance for signs/symptoms or preeclampsia are advised. I recommend serial growth ultrasounds with Doppler of the umbilical arteries.\par \par •	Recurrent pregnancy loss, currently pregnant\par \par Spontaneous abortions are relatively common and sporadic events. Miscarriage occurs in about 10-15% of clinically recognized pregnancies under 20 weeks of gestation. The overall risk of miscarriage in the next pregnancy remains 15%, but the risk increases with each subsequent consecutive miscarriage. Causes of recurrent pregnancy loss include fetal aneuploidy, infection, uterine anomalies or maternal medical conditions such as diabetes, hypertension, thyroid disease or an acquired thrombophilia. In this case, there is no evidence of diabetes, thyroid dysfunction, or hypertension. Her blood pressure is normal today. As the patient has carried one previous pregnancy to term, she most likely does not have a uterine anomaly causing recurrent pregnancy loss. She and her  can consider being tested for chromosomal anomalies. Karyotyping would allow detection of balanced reciprocal or Robertsonian translocations or mosaicism that could be passed to the fetus unbalanced. The most likely explanation for recurrent pregnancy loss is fetal aneuploidy. Another possible, but less likely diagnosis in the differential is an acquired thrombophilia, specifically antiphospholipid syndrome (APLS). This condition is diagnosed based on the [place] criteria which require clinical and laboratory criteria (anticardiolipin antibody IgG and IgM, beta-2 glycoprotein antibodies IgG and IgM, and lupus anticoagulant). Three or more unexplained consecutive spontaneous pregnancy losses before the 10th week of pregnancy is one of the clinical criteria for APLS—Justyna meets this criteria. Based on review of available records, it’s not clear whether the patient had this testing previously. APLS testing should be considered.\par \par •	Anxiety\par \par Anxiety and other mood disorders are commonly encountered in reproductive age women. Severe untreated psychiatric conditions increase the risk for problems in pregnancy including noncompliance with prenatal care, substance abuse, poor appetite/weight gain, insomnia, worsening of mood disorders, suicidal ideation, lack of breastfeeding and mother-infant bonding. Medical treatment during pregnancy may be necessary for the health of the patient and the baby. Justyna feels well and believes that she does not require anxiolytic medication at this time. We discussed that, in some cases, the maternal benefits of medication outweigh the risks to the fetus and that, if necessary, certain medications can be started during pregnancy. For women who benefit from medication, the most commonly used class of drugs is SSRIs. If the fetus is exposed in the third trimester, there are small risks after delivery which include poor  adjustment syndrome (agitation, restlessness, irritability) that rarely lasts beyond two weeks. She is at risk for postpartum depression. Her mood should be closely monitored.\par \par \par Thank you for requesting a consultation on this patient. The total time spent in preparation for this visit, medical history taking, orders, review of records, counseling the patient, and writing this note was 60 minutes.\par \par At the end of our discussion, the patient indicated that her questions were answered and she seemed satisfied with our discussion. Please do not hesitate to contact us with any questions.\par \par Sincerely,\par \par \par Pedro Luis Booker MD, CRISS\par Attending Physician, Maternal-Fetal Medicine\par \par

## 2022-05-12 NOTE — OB HISTORY
[Pregnancy History] : patient received anesthesia [___] : no pregnancy complications reported [Spontaneous] : Spontaneous conception [Sonogram] : sonogram [at ___ wks] : at [unfilled] weeks [FreeTextEntry1] : #7 2018 SAB  8 wks no tx/no testing\par #8   SAB  8wks nO TX/no testing\par #9   SAB 8 wks no tx/no testing\par # 10  SAB 8 wks no tx/no testing\par This is a 36 yr old  10 1 here for [facility] due to AMA and recurrent pregnancy losses and Low LUANN-A on a first trimester screen\par Pt had GC ON 22 and declined prenatal diagnoses and NIPS, SEQ drawn today\par Pt is a poor historian and has a hard time recollecting her prior pregnancies but reports had approx 6 first trimester SABs that didn’t require any treatment and reports never had any of them tested genetically tested and doesn’t think she ever had a workup herself\par Level 2 is scheduled for 6/15/22 and the FH today was visualized and positive\par Denies bleeding or cramping\par  [Definite:  ___ (Date)] : the last menstrual period was [unfilled] [Normal Amount/Duration] : was of a normal amount and duration [Spotting Between  Menses] : no spotting between menses [Regular Cycle Intervals] : periods have been regular [Frequency: Q ___ days] : menstrual periods occur approximately every [unfilled] days [Menarche Age: ____] : age at menarche was [unfilled] [Menstrual Cramps] : no menstrual cramps [On BCP at conception] : the patient was not on BCP at conception

## 2022-05-12 NOTE — VITALS
[US Date: ___] : ultrasound performed on [unfilled]. [GA= ___ Weeks] : Results were GA of [unfilled] weeks [GA= ___ Days] : and [unfilled] day(s) [JUAN by US (date): ___] : The calculated JUAN by US is [unfilled]

## 2022-05-12 NOTE — FAMILY HISTORY
[Reported Family History Of Birth Defects] : no congenital heart defects [Kelvin-Sachs Carrier] : no Kelvin-Sachs [Family History] : no mental retardation/autism [Reported Family History Of Genetic Disease] : no history of child defect in child of baby father

## 2022-05-14 ENCOUNTER — NON-APPOINTMENT (OUTPATIENT)
Age: 37
End: 2022-05-14

## 2022-05-16 ENCOUNTER — NON-APPOINTMENT (OUTPATIENT)
Age: 37
End: 2022-05-16

## 2022-05-16 LAB
1ST TRIMESTER DATA: NORMAL
2ND TRIMESTER DATA: NORMAL
ADDENDUM DOC: NORMAL
AFP PNL SERPL: NORMAL
AFP SERPL-ACNC: NORMAL
AFP SERPL-ACNC: NORMAL
B-HCG FREE SERPL-MCNC: NORMAL
CLINICAL BIOCHEMIST REVIEW: ABNORMAL
CLINICAL BIOCHEMIST REVIEW: NORMAL
CLINICAL BIOCHEMIST REVIEW: NORMAL
FREE BETA HCG 1ST TRIMESTER: NORMAL
INHIBIN A SERPL-MCNC: NORMAL
Lab: NORMAL
NASAL BONE: PRESENT
NOTES NTD: NORMAL
NT: NORMAL
PAPP-A SERPL-ACNC: NORMAL
U ESTRIOL SERPL-SCNC: NORMAL

## 2022-05-17 ENCOUNTER — NON-APPOINTMENT (OUTPATIENT)
Age: 37
End: 2022-05-17

## 2022-05-17 ENCOUNTER — EMERGENCY (EMERGENCY)
Facility: HOSPITAL | Age: 37
LOS: 1 days | Discharge: HOME | End: 2022-05-17
Attending: EMERGENCY MEDICINE
Payer: COMMERCIAL

## 2022-05-17 VITALS
RESPIRATION RATE: 18 BRPM | SYSTOLIC BLOOD PRESSURE: 128 MMHG | HEART RATE: 96 BPM | DIASTOLIC BLOOD PRESSURE: 62 MMHG | OXYGEN SATURATION: 98 %

## 2022-05-17 VITALS
WEIGHT: 149.03 LBS | TEMPERATURE: 99 F | RESPIRATION RATE: 18 BRPM | HEART RATE: 105 BPM | SYSTOLIC BLOOD PRESSURE: 136 MMHG | DIASTOLIC BLOOD PRESSURE: 66 MMHG | OXYGEN SATURATION: 99 % | HEIGHT: 64 IN

## 2022-05-17 LAB
ALBUMIN SERPL ELPH-MCNC: 3.8 G/DL — SIGNIFICANT CHANGE UP (ref 3.3–5.2)
ALP SERPL-CCNC: 96 U/L — SIGNIFICANT CHANGE UP (ref 40–120)
ALT FLD-CCNC: 9 U/L — SIGNIFICANT CHANGE UP
ANION GAP SERPL CALC-SCNC: 14 MMOL/L — SIGNIFICANT CHANGE UP (ref 5–17)
APPEARANCE UR: ABNORMAL
APTT BLD: 30.3 SEC — SIGNIFICANT CHANGE UP (ref 27.5–35.5)
AST SERPL-CCNC: 11 U/L — SIGNIFICANT CHANGE UP
BACTERIA # UR AUTO: ABNORMAL
BASOPHILS # BLD AUTO: 0.05 K/UL — SIGNIFICANT CHANGE UP (ref 0–0.2)
BASOPHILS NFR BLD AUTO: 0.3 % — SIGNIFICANT CHANGE UP (ref 0–2)
BILIRUB SERPL-MCNC: 1.1 MG/DL — SIGNIFICANT CHANGE UP (ref 0.4–2)
BILIRUB UR-MCNC: NEGATIVE — SIGNIFICANT CHANGE UP
BUN SERPL-MCNC: 5.3 MG/DL — LOW (ref 8–20)
CALCIUM SERPL-MCNC: 9.7 MG/DL — SIGNIFICANT CHANGE UP (ref 8.6–10.2)
CHLORIDE SERPL-SCNC: 97 MMOL/L — LOW (ref 98–107)
CO2 SERPL-SCNC: 22 MMOL/L — SIGNIFICANT CHANGE UP (ref 22–29)
COLOR SPEC: YELLOW — SIGNIFICANT CHANGE UP
CREAT SERPL-MCNC: 0.48 MG/DL — LOW (ref 0.5–1.3)
DIFF PNL FLD: ABNORMAL
EGFR: 125 ML/MIN/1.73M2 — SIGNIFICANT CHANGE UP
EOSINOPHIL # BLD AUTO: 0.12 K/UL — SIGNIFICANT CHANGE UP (ref 0–0.5)
EOSINOPHIL NFR BLD AUTO: 0.7 % — SIGNIFICANT CHANGE UP (ref 0–6)
EPI CELLS # UR: SIGNIFICANT CHANGE UP
GLUCOSE SERPL-MCNC: 133 MG/DL — HIGH (ref 70–99)
GLUCOSE UR QL: NEGATIVE MG/DL — SIGNIFICANT CHANGE UP
HCG SERPL-ACNC: HIGH MIU/ML
HCT VFR BLD CALC: 31.6 % — LOW (ref 34.5–45)
HGB BLD-MCNC: 10.8 G/DL — LOW (ref 11.5–15.5)
IMM GRANULOCYTES NFR BLD AUTO: 0.7 % — SIGNIFICANT CHANGE UP (ref 0–1.5)
INR BLD: 1.19 RATIO — HIGH (ref 0.88–1.16)
KETONES UR-MCNC: NEGATIVE — SIGNIFICANT CHANGE UP
LEUKOCYTE ESTERASE UR-ACNC: NEGATIVE — SIGNIFICANT CHANGE UP
LYMPHOCYTES # BLD AUTO: 13.7 % — SIGNIFICANT CHANGE UP (ref 13–44)
LYMPHOCYTES # BLD AUTO: 2.33 K/UL — SIGNIFICANT CHANGE UP (ref 1–3.3)
MCHC RBC-ENTMCNC: 30.3 PG — SIGNIFICANT CHANGE UP (ref 27–34)
MCHC RBC-ENTMCNC: 34.2 GM/DL — SIGNIFICANT CHANGE UP (ref 32–36)
MCV RBC AUTO: 88.8 FL — SIGNIFICANT CHANGE UP (ref 80–100)
MONOCYTES # BLD AUTO: 0.88 K/UL — SIGNIFICANT CHANGE UP (ref 0–0.9)
MONOCYTES NFR BLD AUTO: 5.2 % — SIGNIFICANT CHANGE UP (ref 2–14)
NEUTROPHILS # BLD AUTO: 13.46 K/UL — HIGH (ref 1.8–7.4)
NEUTROPHILS NFR BLD AUTO: 79.4 % — HIGH (ref 43–77)
NITRITE UR-MCNC: NEGATIVE — SIGNIFICANT CHANGE UP
PH UR: 7 — SIGNIFICANT CHANGE UP (ref 5–8)
PLATELET # BLD AUTO: 344 K/UL — SIGNIFICANT CHANGE UP (ref 150–400)
POTASSIUM SERPL-MCNC: 3.9 MMOL/L — SIGNIFICANT CHANGE UP (ref 3.5–5.3)
POTASSIUM SERPL-SCNC: 3.9 MMOL/L — SIGNIFICANT CHANGE UP (ref 3.5–5.3)
PROT SERPL-MCNC: 8.1 G/DL — SIGNIFICANT CHANGE UP (ref 6.6–8.7)
PROT UR-MCNC: NEGATIVE — SIGNIFICANT CHANGE UP
PROTHROM AB SERPL-ACNC: 13.8 SEC — HIGH (ref 10.5–13.4)
RAPID RVP RESULT: SIGNIFICANT CHANGE UP
RBC # BLD: 3.56 M/UL — LOW (ref 3.8–5.2)
RBC # FLD: 12.1 % — SIGNIFICANT CHANGE UP (ref 10.3–14.5)
RBC CASTS # UR COMP ASSIST: SIGNIFICANT CHANGE UP /HPF (ref 0–4)
S PYO DNA THROAT QL NAA+PROBE: SIGNIFICANT CHANGE UP
SARS-COV-2 RNA SPEC QL NAA+PROBE: SIGNIFICANT CHANGE UP
SODIUM SERPL-SCNC: 133 MMOL/L — LOW (ref 135–145)
SP GR SPEC: 1 — LOW (ref 1.01–1.02)
UROBILINOGEN FLD QL: NEGATIVE MG/DL — SIGNIFICANT CHANGE UP
WBC # BLD: 16.96 K/UL — HIGH (ref 3.8–10.5)
WBC # FLD AUTO: 16.96 K/UL — HIGH (ref 3.8–10.5)
WBC UR QL: NEGATIVE /HPF — SIGNIFICANT CHANGE UP (ref 0–5)

## 2022-05-17 PROCEDURE — 87651 STREP A DNA AMP PROBE: CPT

## 2022-05-17 PROCEDURE — 36415 COLL VENOUS BLD VENIPUNCTURE: CPT

## 2022-05-17 PROCEDURE — 85730 THROMBOPLASTIN TIME PARTIAL: CPT

## 2022-05-17 PROCEDURE — 0225U NFCT DS DNA&RNA 21 SARSCOV2: CPT

## 2022-05-17 PROCEDURE — 85610 PROTHROMBIN TIME: CPT

## 2022-05-17 PROCEDURE — 85025 COMPLETE CBC W/AUTO DIFF WBC: CPT

## 2022-05-17 PROCEDURE — 80053 COMPREHEN METABOLIC PANEL: CPT

## 2022-05-17 PROCEDURE — 99285 EMERGENCY DEPT VISIT HI MDM: CPT | Mod: 25

## 2022-05-17 PROCEDURE — 71045 X-RAY EXAM CHEST 1 VIEW: CPT | Mod: 26

## 2022-05-17 PROCEDURE — 71045 X-RAY EXAM CHEST 1 VIEW: CPT

## 2022-05-17 PROCEDURE — 99284 EMERGENCY DEPT VISIT MOD MDM: CPT

## 2022-05-17 PROCEDURE — 87086 URINE CULTURE/COLONY COUNT: CPT

## 2022-05-17 PROCEDURE — 87798 DETECT AGENT NOS DNA AMP: CPT

## 2022-05-17 PROCEDURE — 84702 CHORIONIC GONADOTROPIN TEST: CPT

## 2022-05-17 PROCEDURE — 81001 URINALYSIS AUTO W/SCOPE: CPT

## 2022-05-17 RX ORDER — ACETAMINOPHEN 500 MG
650 TABLET ORAL ONCE
Refills: 0 | Status: COMPLETED | OUTPATIENT
Start: 2022-05-17 | End: 2022-05-17

## 2022-05-17 RX ORDER — ERYTHROMYCIN BASE 5 MG/GRAM
1 OINTMENT (GRAM) OPHTHALMIC (EYE) ONCE
Refills: 0 | Status: COMPLETED | OUTPATIENT
Start: 2022-05-17 | End: 2022-05-17

## 2022-05-17 RX ORDER — SODIUM CHLORIDE 9 MG/ML
1000 INJECTION INTRAMUSCULAR; INTRAVENOUS; SUBCUTANEOUS ONCE
Refills: 0 | Status: COMPLETED | OUTPATIENT
Start: 2022-05-17 | End: 2022-05-17

## 2022-05-17 RX ADMIN — Medication 650 MILLIGRAM(S): at 10:52

## 2022-05-17 RX ADMIN — SODIUM CHLORIDE 1000 MILLILITER(S): 9 INJECTION INTRAMUSCULAR; INTRAVENOUS; SUBCUTANEOUS at 10:52

## 2022-05-17 RX ADMIN — Medication 1 APPLICATION(S): at 13:39

## 2022-05-17 NOTE — ED PROVIDER NOTE - PATIENT PORTAL LINK FT
You can access the FollowMyHealth Patient Portal offered by  by registering at the following website: [email or web address]. By joining [email or web address]’s FollowMyHealth portal, you will also be able to view your health information using other applications (apps) compatible with our system.

## 2022-05-17 NOTE — ED PROCEDURE NOTE - ATTENDING CONTRIBUTION TO CARE
I, Ricky Charles, performed the initial face to face bedside interview with this patient regarding history of present illness, review of symptoms and relevant past medical, social and family history.  I completed an independent physical examination.  I was the initial provider who evaluated this patient. I have signed out the follow up of any pending tests (i.e. labs, radiological studies) to the resident.  I have communicated the patient’s plan of care and disposition with the resident.

## 2022-05-17 NOTE — ED PROVIDER NOTE - PHYSICAL EXAMINATION
General: NAD, tired appearing  HEENT: Normocephalic, atraumatic, PERRLA, left eye red conjuntiva but vision intact  Neck: No apparent stiffness or JVD  Pulm: Chest wall symmetric and nontender, lungs clear to ascultation   Cardiac: Regular rate and regular rhythm  Abdomen: Nontender and slightly gravid  Skin: Skin is warm, dry and intact without rashes or lesions.  Neuro: No motor or sensory deficit   MSK: No deformity or tenderness

## 2022-05-17 NOTE — ED PROVIDER NOTE - ATTENDING CONTRIBUTION TO CARE
The patient seen and examined    Viral syndrome  Pregnancy     I, Ricky Charles, performed the initial face to face bedside interview with this patient regarding history of present illness, review of symptoms and relevant past medical, social and family history.  I completed an independent physical examination.  I was the initial provider who evaluated this patient. I have signed out the follow up of any pending tests (i.e. labs, radiological studies) to the resident.  I have communicated the patient’s plan of care and disposition with the resident.

## 2022-05-17 NOTE — ED PROVIDER NOTE - PROGRESS NOTE DETAILS
Workup is unremarkable for any emergent process.   Patient is now feeling improved and wishes to leave.  Patient / family members have capacity.  Abx ointment given for left eye conjunctivitis.   Patient/family was given full return precautions, counseled on red flag symptoms such as LOC, fever, severe pain, or focal deficits and advised to return to the ED for these reasons or any reason that was concerning to them. Patient/family was informed of all significant and incidental findings found on this workup today and all results were reviewed.   All questions were answered, advised to make close follow up with their primary care provider and specialty clinics (OBGYN) to follow up with this visit and continue investigation/treatment.  PAtient advised to discuss the headaches and elevated BP reading in ED today with them. Patient/family has shown adequate understanding and is agreeable to the plan.

## 2022-05-17 NOTE — ED PROVIDER NOTE - NS ED ROS FT
General: No fever, no massive bleeding  Head: + HA, no ongoing scalp bleed  ENT: no neck pain, no sore throat  Resp: No SOB, no hemoptysis  Cardiovascular: No CP, No LOC  GI: No abdominal pain, No blood in stool  : No dysuria, no hematuria   MSK: No back pain, no limb pain  Skin: No painful or bleeding lesions  Neuro: No paresthesias, No focal deficits

## 2022-05-17 NOTE — ED ADULT NURSE REASSESSMENT NOTE - NS ED NURSE REASSESS COMMENT FT1
pt triaged, treated and dispo by provider, pt verbalized understanding of discharge instructions, pt medicated  with eye ointment as ordered prior to discharge, please refer to provider notes.

## 2022-05-17 NOTE — ED PROVIDER NOTE - OBJECTIVE STATEMENT
36F 16 wks pregnant, hx anemia, p/w cough, congestion, green sputum, sore throat,  mailase, and generalized HA for about 2 weeks, currently on abx by PCM but not feeling any better.  Also has left eye redness and itching for multiple days.  Otherwise denies vaginal bleeding, SOB, chest pain, abdominal pain or fevers.  Denies other pertinent medical problems.  Denies any substance use.

## 2022-05-17 NOTE — ED PROVIDER NOTE - NSFOLLOWUPINSTRUCTIONS_ED_ALL_ED_FT
Viral Respiratory Infection    A viral respiratory infection is an illness that affects parts of the body used for breathing, like the lungs, nose, and throat. It is caused by a germ called a virus. Symptoms can include runny nose, coughing, sneezing, fatigue, body aches, sore throat, fever, or headache. Over the counter medicine can be used to manage the symptoms but the infection typically goes away on its own in 5 to 10 days.     SEEK IMMEDIATE MEDICAL CARE IF YOU HAVE ANY OF THE FOLLOWING SYMPTOMS: shortness of breath, chest pain, fever over 10 days, or lightheadedness/dizziness.     Headache    A headache is pain or discomfort felt around the head or neck area. The specific cause of a headache may not be found as there are many types including tension headaches, migraine headaches, and cluster headaches. Watch your condition for any changes. Things you can do to manage your pain include taking over the counter and prescription medications as instructed by your health care provider, lying down in a dark quiet room, limiting stress, getting regular sleep, and refraining from alcohol and tobacco products.    SEEK IMMEDIATE MEDICAL CARE IF YOU HAVE ANY OF THE FOLLOWING SYMPTOMS: fever, vomiting, stiff neck, loss of vision, problems with speech, muscle weakness, loss of balance, trouble walking, passing out, or confusion.

## 2022-05-17 NOTE — ED PROVIDER NOTE - CARE PROVIDER_API CALL
Gelacio Rojas)  Obstetrics and Gynecology  [address], [address]  [place] [address]  Phone: [phone]  Fax: [phone]  Follow Up Time: 4-6 Days

## 2022-05-17 NOTE — ED PROVIDER NOTE - CLINICAL SUMMARY MEDICAL DECISION MAKING FREE TEXT BOX
36F 16 wks pregnant, hx anemia, p/w cough, congestion, green sputum, sore throat,  malaise, and generalized HA for about 2 weeks, currently on abx by PCM but not feeling any better. Pt neuro intact on exam.  Patient given IV fluids and medication.  EKG labs and imaging performed to evaluate the patient.

## 2022-05-18 LAB
CULTURE RESULTS: NO GROWTH — SIGNIFICANT CHANGE UP
SPECIMEN SOURCE: SIGNIFICANT CHANGE UP

## 2022-05-19 RX ORDER — AMOXICILLIN 500 MG/1
500 TABLET, FILM COATED ORAL 3 TIMES DAILY
Qty: 21 | Refills: 0 | Status: COMPLETED | COMMUNITY
Start: 2022-05-14 | End: 2022-05-19

## 2022-05-19 RX ORDER — NITROFURANTOIN (MONOHYDRATE/MACROCRYSTALS) 25; 75 MG/1; MG/1
100 CAPSULE ORAL
Qty: 14 | Refills: 0 | Status: COMPLETED | COMMUNITY
Start: 2022-04-19 | End: 2022-05-19

## 2022-05-24 ENCOUNTER — NON-APPOINTMENT (OUTPATIENT)
Age: 37
End: 2022-05-24

## 2022-05-25 ENCOUNTER — APPOINTMENT (OUTPATIENT)
Dept: ANTEPARTUM | Facility: CLINIC | Age: 37
End: 2022-05-25
Payer: COMMERCIAL

## 2022-05-25 ENCOUNTER — ASOB RESULT (OUTPATIENT)
Age: 37
End: 2022-05-25

## 2022-05-25 PROCEDURE — 76815 OB US LIMITED FETUS(S): CPT

## 2022-05-25 PROCEDURE — 36415 COLL VENOUS BLD VENIPUNCTURE: CPT

## 2022-05-31 LAB
2ND TRIMESTER DATA: NORMAL
ADDENDUM DOC: NORMAL
AFP PNL SERPL: NORMAL
AFP SERPL-ACNC: NORMAL
CLINICAL BIOCHEMIST REVIEW: ABNORMAL
Lab: NORMAL
NOTES NTD: NORMAL

## 2022-06-01 ENCOUNTER — NON-APPOINTMENT (OUTPATIENT)
Age: 37
End: 2022-06-01

## 2022-06-01 ENCOUNTER — APPOINTMENT (OUTPATIENT)
Dept: OBGYN | Facility: CLINIC | Age: 37
End: 2022-06-01
Payer: COMMERCIAL

## 2022-06-01 VITALS
HEIGHT: 64 IN | DIASTOLIC BLOOD PRESSURE: 60 MMHG | SYSTOLIC BLOOD PRESSURE: 110 MMHG | WEIGHT: 153.5 LBS | BODY MASS INDEX: 26.21 KG/M2

## 2022-06-01 PROCEDURE — 0502F SUBSEQUENT PRENATAL CARE: CPT

## 2022-06-09 ENCOUNTER — APPOINTMENT (OUTPATIENT)
Dept: MATERNAL FETAL MEDICINE | Facility: CLINIC | Age: 37
End: 2022-06-09
Payer: COMMERCIAL

## 2022-06-09 ENCOUNTER — ASOB RESULT (OUTPATIENT)
Age: 37
End: 2022-06-09

## 2022-06-09 PROCEDURE — 99442: CPT | Mod: 95

## 2022-06-15 ENCOUNTER — APPOINTMENT (OUTPATIENT)
Dept: ANTEPARTUM | Facility: CLINIC | Age: 37
End: 2022-06-15

## 2022-06-15 ENCOUNTER — ASOB RESULT (OUTPATIENT)
Age: 37
End: 2022-06-15

## 2022-06-15 ENCOUNTER — APPOINTMENT (OUTPATIENT)
Dept: MATERNAL FETAL MEDICINE | Facility: CLINIC | Age: 37
End: 2022-06-15

## 2022-06-15 ENCOUNTER — TRANSCRIPTION ENCOUNTER (OUTPATIENT)
Age: 37
End: 2022-06-15

## 2022-06-15 DIAGNOSIS — Z87.440 PERSONAL HISTORY OF URINARY (TRACT) INFECTIONS: ICD-10-CM

## 2022-06-15 PROCEDURE — 76811 OB US DETAILED SNGL FETUS: CPT

## 2022-06-15 PROCEDURE — 76817 TRANSVAGINAL US OBSTETRIC: CPT

## 2022-06-16 ENCOUNTER — NON-APPOINTMENT (OUTPATIENT)
Age: 37
End: 2022-06-16

## 2022-06-16 ENCOUNTER — APPOINTMENT (OUTPATIENT)
Dept: OBGYN | Facility: CLINIC | Age: 37
End: 2022-06-16
Payer: COMMERCIAL

## 2022-06-16 ENCOUNTER — APPOINTMENT (OUTPATIENT)
Dept: ULTRASOUND IMAGING | Facility: HOSPITAL | Age: 37
End: 2022-06-16

## 2022-06-16 ENCOUNTER — OUTPATIENT (OUTPATIENT)
Dept: OUTPATIENT SERVICES | Facility: HOSPITAL | Age: 37
LOS: 1 days | End: 2022-06-16
Payer: COMMERCIAL

## 2022-06-16 ENCOUNTER — APPOINTMENT (OUTPATIENT)
Dept: ULTRASOUND IMAGING | Facility: CLINIC | Age: 37
End: 2022-06-16
Payer: COMMERCIAL

## 2022-06-16 VITALS
DIASTOLIC BLOOD PRESSURE: 73 MMHG | WEIGHT: 154.31 LBS | SYSTOLIC BLOOD PRESSURE: 108 MMHG | HEIGHT: 64 IN | BODY MASS INDEX: 26.34 KG/M2

## 2022-06-16 DIAGNOSIS — O26.899 OTHER SPECIFIED PREGNANCY RELATED CONDITIONS, UNSPECIFIED TRIMESTER: ICD-10-CM

## 2022-06-16 PROCEDURE — 93970 EXTREMITY STUDY: CPT

## 2022-06-16 PROCEDURE — 0502F SUBSEQUENT PRENATAL CARE: CPT

## 2022-06-16 PROCEDURE — 93970 EXTREMITY STUDY: CPT | Mod: 26

## 2022-06-27 ENCOUNTER — APPOINTMENT (OUTPATIENT)
Dept: MRI IMAGING | Facility: HOSPITAL | Age: 37
End: 2022-06-27
Payer: COMMERCIAL

## 2022-06-27 ENCOUNTER — OUTPATIENT (OUTPATIENT)
Dept: OUTPATIENT SERVICES | Age: 37
LOS: 1 days | End: 2022-06-27

## 2022-06-27 DIAGNOSIS — N39.44 NOCTURNAL ENURESIS: ICD-10-CM

## 2022-06-27 DIAGNOSIS — R32 UNSPECIFIED URINARY INCONTINENCE: ICD-10-CM

## 2022-06-27 PROCEDURE — 74712 MRI FETAL SNGL/1ST GESTATION: CPT | Mod: 26

## 2022-07-05 ENCOUNTER — APPOINTMENT (OUTPATIENT)
Dept: MRI IMAGING | Facility: CLINIC | Age: 37
End: 2022-07-05

## 2022-07-07 ENCOUNTER — APPOINTMENT (OUTPATIENT)
Dept: OBGYN | Facility: CLINIC | Age: 37
End: 2022-07-07

## 2022-07-07 VITALS
BODY MASS INDEX: 27.39 KG/M2 | DIASTOLIC BLOOD PRESSURE: 71 MMHG | WEIGHT: 160.44 LBS | SYSTOLIC BLOOD PRESSURE: 123 MMHG | HEIGHT: 64 IN

## 2022-07-07 PROCEDURE — 0502F SUBSEQUENT PRENATAL CARE: CPT

## 2022-07-08 ENCOUNTER — NON-APPOINTMENT (OUTPATIENT)
Age: 37
End: 2022-07-08

## 2022-07-08 LAB
BASOPHILS # BLD AUTO: 0.03 K/UL
BASOPHILS NFR BLD AUTO: 0.3 %
EOSINOPHIL # BLD AUTO: 0.09 K/UL
EOSINOPHIL NFR BLD AUTO: 1 %
GLUCOSE 1H P 50 G GLC PO SERPL-MCNC: 162 MG/DL
HCT VFR BLD CALC: 27.5 %
HGB BLD-MCNC: 9.1 G/DL
IMM GRANULOCYTES NFR BLD AUTO: 0.9 %
LYMPHOCYTES # BLD AUTO: 2.11 K/UL
LYMPHOCYTES NFR BLD AUTO: 23.1 %
MAN DIFF?: NORMAL
MCHC RBC-ENTMCNC: 30.2 PG
MCHC RBC-ENTMCNC: 33.1 GM/DL
MCV RBC AUTO: 91.4 FL
MONOCYTES # BLD AUTO: 0.68 K/UL
MONOCYTES NFR BLD AUTO: 7.4 %
NEUTROPHILS # BLD AUTO: 6.14 K/UL
NEUTROPHILS NFR BLD AUTO: 67.3 %
PLATELET # BLD AUTO: 272 K/UL
RBC # BLD: 3.01 M/UL
RBC # FLD: 13.2 %
WBC # FLD AUTO: 9.13 K/UL

## 2022-07-12 ENCOUNTER — NON-APPOINTMENT (OUTPATIENT)
Age: 37
End: 2022-07-12

## 2022-07-13 ENCOUNTER — ASOB RESULT (OUTPATIENT)
Age: 37
End: 2022-07-13

## 2022-07-13 ENCOUNTER — APPOINTMENT (OUTPATIENT)
Dept: ANTEPARTUM | Facility: CLINIC | Age: 37
End: 2022-07-13

## 2022-07-13 ENCOUNTER — APPOINTMENT (OUTPATIENT)
Dept: MATERNAL FETAL MEDICINE | Facility: CLINIC | Age: 37
End: 2022-07-13

## 2022-07-13 VITALS
SYSTOLIC BLOOD PRESSURE: 104 MMHG | OXYGEN SATURATION: 99 % | RESPIRATION RATE: 16 BRPM | HEART RATE: 90 BPM | WEIGHT: 161 LBS | HEIGHT: 64 IN | BODY MASS INDEX: 27.49 KG/M2 | DIASTOLIC BLOOD PRESSURE: 50 MMHG

## 2022-07-13 VITALS
HEIGHT: 64 IN | DIASTOLIC BLOOD PRESSURE: 50 MMHG | HEART RATE: 90 BPM | SYSTOLIC BLOOD PRESSURE: 104 MMHG | WEIGHT: 161 LBS | BODY MASS INDEX: 27.49 KG/M2 | RESPIRATION RATE: 16 BRPM | OXYGEN SATURATION: 99 %

## 2022-07-13 PROCEDURE — 76816 OB US FOLLOW-UP PER FETUS: CPT

## 2022-07-13 PROCEDURE — 99215 OFFICE O/P EST HI 40 MIN: CPT

## 2022-07-13 RX ORDER — ASPIRIN 81 MG
81 TABLET, DELAYED RELEASE (ENTERIC COATED) ORAL
Refills: 0 | Status: ACTIVE | COMMUNITY

## 2022-07-13 NOTE — OB HISTORY
[Pregnancy History] : patient received anesthesia [Spontaneous] : Spontaneous conception [Sonogram] : sonogram [at ___ wks] : at [unfilled] weeks [Definite:  ___ (Date)] : the last menstrual period was [unfilled] [Normal Amount/Duration] : was of a normal amount and duration [Regular Cycle Intervals] : periods have been regular [Frequency: Q ___ days] : menstrual periods occur approximately every [unfilled] days [Menarche Age: ____] : age at menarche was [unfilled] [___] : no pregnancy complications reported [FreeTextEntry1] : #7 2018 SAB  8 wks no tx/no testing\par #8 2019  SAB  8wks nO TX/no testing\par #9 2020  SAB 8 wks no tx/no testing\par # 10 2021 SAB 8 wks no tx/no testing\par \par Pt had GC on 4/29/22 and declined prenatal diagnostic testing and NIPS. \par \par She had a maternal-fetal medicine consultation on 5/11/2022 because of her advanced maternal age, recurrent pregnancy losses and Low LUANN-A level on the first trimester screen test.  \par \par She had a follow-up genetic counseling session on 6/9/2022 due to the reported elevated maternal serum alpha-fetoprotein during the sequential screen testing.\par \par  [Spotting Between  Menses] : no spotting between menses [Menstrual Cramps] : no menstrual cramps [On BCP at conception] : the patient was not on BCP at conception

## 2022-07-13 NOTE — DISCUSSION/SUMMARY
[FreeTextEntry1] : She is 25 weeks and 1 day gestation by early ultrasound dating.\par \par The sequential screen test done on 2022 reported a low risk for Down syndrome, trisomy 18, and an increased risk for open neural tube defects.  There is no evidence of neural tube defects on her ultrasound examinations.  The maternal alpha-fetoprotein level was elevated at 2.33 multiples of the mean or 99th percentile.  The maternal inhibin A level was also elevated at 2.06 multiples of the mean or 90th percentile. I told her that pregnancy with simultaneous elevations of maternal serum AFP and inhibin A levels are at increased risk for adverse pregnancy outcomes. There is a 20-fold increased risk for  birth less than 32 weeks, 18-fold increased risk for early fetal loss less than 24 weeks, 9 fold increased risk for late fetal loss greater than 24 weeks and 4.3 fold increased risk for preeclampsia. I gave her  labor precautions. She was advised to use a condom during sexual intercourse or to abstain from having sexual intercourse to reduce her risk of having vaginal infections and a  delivery. She was advised to have serial ultrasound examinations for fetal growth with Doppler studies during the third trimester.  I also suggest fetal surveillance during the third trimester of pregnancy with weekly NSTs or BPPs starting at 36 weeks of gestation due to the increased risk for stillbirth.  She can also perform daily fetal movement counts as an adjunct to the NSTs or BPPs. I recommended a labor induction at 39 weeks of gestation if she has not given birth before 39 weeks of gestation. I recommended taking a daily baby aspirin to reduce her risk for developing preeclampsia.  She told me that she has been taking daily baby aspirin to reduce her risk for developing preeclampsia. I advised her to discontinue taking the daily baby aspirin at 37 weeks of gestation. All questions were answered.

## 2022-07-13 NOTE — CHIEF COMPLAINT
[G ___] : G [unfilled] [P ___] : P [unfilled] [de-identified] : elevated AFP and inhibin A levels during second trimester screen testing

## 2022-07-14 ENCOUNTER — NON-APPOINTMENT (OUTPATIENT)
Age: 37
End: 2022-07-14

## 2022-07-18 ENCOUNTER — ASOB RESULT (OUTPATIENT)
Age: 37
End: 2022-07-18

## 2022-07-18 ENCOUNTER — APPOINTMENT (OUTPATIENT)
Dept: MATERNAL FETAL MEDICINE | Facility: CLINIC | Age: 37
End: 2022-07-18

## 2022-07-18 PROCEDURE — G0108 DIAB MANAGE TRN  PER INDIV: CPT | Mod: 95

## 2022-07-19 RX ORDER — BLOOD SUGAR DIAGNOSTIC
STRIP MISCELLANEOUS
Qty: 1 | Refills: 3 | Status: ACTIVE | COMMUNITY
Start: 2022-07-18 | End: 1900-01-01

## 2022-07-19 RX ORDER — BLOOD-GLUCOSE METER
W/DEVICE KIT MISCELLANEOUS
Qty: 1 | Refills: 0 | Status: ACTIVE | COMMUNITY
Start: 2022-07-18 | End: 1900-01-01

## 2022-07-19 RX ORDER — LANCETS
EACH MISCELLANEOUS
Qty: 1 | Refills: 3 | Status: ACTIVE | COMMUNITY
Start: 2022-07-18 | End: 1900-01-01

## 2022-07-25 ENCOUNTER — APPOINTMENT (OUTPATIENT)
Dept: MATERNAL FETAL MEDICINE | Facility: CLINIC | Age: 37
End: 2022-07-25

## 2022-07-25 ENCOUNTER — ASOB RESULT (OUTPATIENT)
Age: 37
End: 2022-07-25

## 2022-07-25 ENCOUNTER — APPOINTMENT (OUTPATIENT)
Dept: MATERNAL FETAL MEDICINE | Facility: CLINIC | Age: 37
End: 2022-07-25
Payer: COMMERCIAL

## 2022-07-25 PROCEDURE — G0108 DIAB MANAGE TRN  PER INDIV: CPT | Mod: 95

## 2022-07-27 ENCOUNTER — NON-APPOINTMENT (OUTPATIENT)
Age: 37
End: 2022-07-27

## 2022-07-29 ENCOUNTER — APPOINTMENT (OUTPATIENT)
Dept: OBGYN | Facility: CLINIC | Age: 37
End: 2022-07-29

## 2022-07-29 VITALS
SYSTOLIC BLOOD PRESSURE: 116 MMHG | BODY MASS INDEX: 28.02 KG/M2 | WEIGHT: 164.13 LBS | HEIGHT: 64 IN | DIASTOLIC BLOOD PRESSURE: 72 MMHG

## 2022-07-29 PROCEDURE — 0502F SUBSEQUENT PRENATAL CARE: CPT

## 2022-08-03 ENCOUNTER — APPOINTMENT (OUTPATIENT)
Dept: ANTEPARTUM | Facility: CLINIC | Age: 37
End: 2022-08-03

## 2022-08-03 ENCOUNTER — APPOINTMENT (OUTPATIENT)
Dept: MATERNAL FETAL MEDICINE | Facility: CLINIC | Age: 37
End: 2022-08-03

## 2022-08-03 VITALS
DIASTOLIC BLOOD PRESSURE: 54 MMHG | WEIGHT: 165 LBS | SYSTOLIC BLOOD PRESSURE: 106 MMHG | HEART RATE: 92 BPM | HEIGHT: 64 IN | RESPIRATION RATE: 16 BRPM | OXYGEN SATURATION: 97 % | BODY MASS INDEX: 28.17 KG/M2

## 2022-08-03 VITALS
OXYGEN SATURATION: 97 % | HEIGHT: 64 IN | DIASTOLIC BLOOD PRESSURE: 54 MMHG | WEIGHT: 165 LBS | RESPIRATION RATE: 16 BRPM | SYSTOLIC BLOOD PRESSURE: 106 MMHG | HEART RATE: 92 BPM | BODY MASS INDEX: 28.17 KG/M2

## 2022-08-03 DIAGNOSIS — N96 RECURRENT PREGNANCY LOSS: ICD-10-CM

## 2022-08-03 PROCEDURE — 99215 OFFICE O/P EST HI 40 MIN: CPT

## 2022-08-03 PROCEDURE — 36415 COLL VENOUS BLD VENIPUNCTURE: CPT

## 2022-08-03 NOTE — OB HISTORY
[Pregnancy History] : patient received anesthesia [Spontaneous] : Spontaneous conception [Sonogram] : sonogram [at ___ wks] : at [unfilled] weeks [Definite:  ___ (Date)] : the last menstrual period was [unfilled] [Normal Amount/Duration] : was of a normal amount and duration [Regular Cycle Intervals] : periods have been regular [Frequency: Q ___ days] : menstrual periods occur approximately every [unfilled] days [Menarche Age: ____] : age at menarche was [unfilled] [___] : no pregnancy complications reported [FreeTextEntry1] : #7 2018 SAB  8 wks no tx/no testing\par #8 2019  SAB  8wks nO TX/no testing\par #9 2020  SAB 8 wks no tx/no testing\par # 10 2021 SAB 8 wks no tx/no testing\par \par Pt had GC on 4/29/22 and declined prenatal diagnostic testing and NIPS. \par \par She had a maternal-fetal medicine consultation on 5/11/2022 because of her advanced maternal age, recurrent pregnancy losses and Low LUANN-A level on the first trimester screen test.  \par \par She had a follow-up genetic counseling session on 6/9/2022 due to the reported elevated maternal serum alpha-fetoprotein during the sequential screen testing.\par  \par She had a follow-up MFM visit on July 13, 2022 because of elevated maternal serum AFP and inhibin A levels during the second trimester screening test.\par \par The 1 hour Glucola challenge test done on July 6, 2022 reported a glucose of 162. The 3-hour GTT done on July 13, 2022 reported a fasting glucose of 90, 1 hour glucose 229, 2-hour glucose 178, and a 3-hour glucose of 87.  There were 2 abnormal glucose values consistent with a diagnosis of gestational diabetes.  [Spotting Between  Menses] : no spotting between menses [Menstrual Cramps] : no menstrual cramps [On BCP at conception] : the patient was not on BCP at conception

## 2022-08-03 NOTE — DISCUSSION/SUMMARY
[FreeTextEntry1] : She is 28 weeks and 1 day gestation by early ultrasound dating.\par \par Regarding her gestational diabetes, she had a telehealth visit with the diabetes educator on 2021 for initial diabetes education and counseling.  She had a follow-up telehealth visit with the diabetes educator on 2022.  She states that she has been following the recommended diabetic diet. She performs fasting and 1 hour postprandial self glucose monitoring from the beginning of the meal. My review of her food /glucose log book from 22 to 8/3/22 revealed 9/10 elevated fasting glucose values (> 95). She had 8 elevated postprandial glucose values (> 140) from her dietary choices. She appears to be in suboptimal glycemic control. I informed her that maintaining euglycemia is the most important factor associated with good  outcomes in pregnancies complicated by gestational diabetes. I told her that poor glucose control may cause fetal macrosomia, shoulder dystocia,  delivery, stillbirth,  respiratory distress syndrome and  metabolic complications such as hypoglycemia and hyperbilirubinemia.  I told her that she is at risk for developing gestational hypertension or preeclampsia during the current pregnancy. I also told her that she is at risk for developing type 2 diabetes, metabolic syndrome and cardiovascular disease later in life.  I also recommended having a 75 gram 2 hour OGTT approximately 6 - 8 weeks postpartum to determine whether she has impaired glucose tolerance not diagnosed prior to the pregnancy. I gave her dietary counseling. I gave her examples of meals and snacks in [place]. I told her which foods to eat and which foods not to eat. I told her to eat three daily meals with 3 snacks to reduce postprandial glucose fluctuations. I gave her a food diary to write her daily food intake. She was advised to bring the food / glucose diary to her prenatal visits.  I believe she needs medication to lower the fasting glucose levels.  I discussed the use of insulin injections and oral hypoglycemic agent to lower the fasting glucose level.  She elected to to take an oral hypoglycemic medication.  I gave her a prescription for the metformin 500 mg to be taken at bedtime with a protein snack.  I ordered a hemoglobin A1c level.  She was scheduled to have a follow-up telehealth visit with the diabetes educator on 8/15/2022.  She was advised to have a follow-up [facility] visit in 3 - 4 weeks.\par \par \par \par \par

## 2022-08-05 LAB
ESTIMATED AVERAGE GLUCOSE: 100 MG/DL
HBA1C MFR BLD HPLC: 5.1 %

## 2022-08-08 DIAGNOSIS — Z3A.28 28 WEEKS GESTATION OF PREGNANCY: ICD-10-CM

## 2022-08-12 ENCOUNTER — APPOINTMENT (OUTPATIENT)
Dept: OBGYN | Facility: CLINIC | Age: 37
End: 2022-08-12

## 2022-08-12 VITALS
SYSTOLIC BLOOD PRESSURE: 120 MMHG | HEIGHT: 64 IN | DIASTOLIC BLOOD PRESSURE: 64 MMHG | BODY MASS INDEX: 28.18 KG/M2 | WEIGHT: 165.06 LBS

## 2022-08-12 PROCEDURE — 0502F SUBSEQUENT PRENATAL CARE: CPT

## 2022-08-15 ENCOUNTER — APPOINTMENT (OUTPATIENT)
Dept: MATERNAL FETAL MEDICINE | Facility: CLINIC | Age: 37
End: 2022-08-15
Payer: COMMERCIAL

## 2022-08-15 ENCOUNTER — ASOB RESULT (OUTPATIENT)
Age: 37
End: 2022-08-15

## 2022-08-15 PROCEDURE — G0108 DIAB MANAGE TRN  PER INDIV: CPT | Mod: 95

## 2022-08-17 ENCOUNTER — ASOB RESULT (OUTPATIENT)
Age: 37
End: 2022-08-17

## 2022-08-17 ENCOUNTER — APPOINTMENT (OUTPATIENT)
Dept: ANTEPARTUM | Facility: CLINIC | Age: 37
End: 2022-08-17

## 2022-08-17 PROCEDURE — 76816 OB US FOLLOW-UP PER FETUS: CPT

## 2022-08-24 ENCOUNTER — APPOINTMENT (OUTPATIENT)
Dept: OBGYN | Facility: CLINIC | Age: 37
End: 2022-08-24

## 2022-08-24 ENCOUNTER — APPOINTMENT (OUTPATIENT)
Dept: ANTEPARTUM | Facility: CLINIC | Age: 37
End: 2022-08-24

## 2022-08-24 ENCOUNTER — APPOINTMENT (OUTPATIENT)
Dept: MATERNAL FETAL MEDICINE | Facility: CLINIC | Age: 37
End: 2022-08-24

## 2022-08-24 VITALS
HEIGHT: 64 IN | DIASTOLIC BLOOD PRESSURE: 58 MMHG | WEIGHT: 162 LBS | BODY MASS INDEX: 27.66 KG/M2 | OXYGEN SATURATION: 98 % | SYSTOLIC BLOOD PRESSURE: 100 MMHG | HEART RATE: 76 BPM | RESPIRATION RATE: 18 BRPM

## 2022-08-24 VITALS
WEIGHT: 162 LBS | BODY MASS INDEX: 27.66 KG/M2 | SYSTOLIC BLOOD PRESSURE: 100 MMHG | HEIGHT: 64 IN | DIASTOLIC BLOOD PRESSURE: 58 MMHG | HEART RATE: 76 BPM | RESPIRATION RATE: 18 BRPM | OXYGEN SATURATION: 98 %

## 2022-08-24 PROCEDURE — 99215 OFFICE O/P EST HI 40 MIN: CPT

## 2022-08-24 NOTE — VITALS
[US Date: ___] : ultrasound performed on [unfilled]. [GA= ___ Weeks] : Results were GA of [unfilled] weeks [GA= ___ Days] : and [unfilled] day(s) [JUAN by US (date): ___] : The calculated JUAN by US is [unfilled] [By US] : this is the final JUAN

## 2022-08-24 NOTE — OB HISTORY
[Pregnancy History] : patient received anesthesia [Spontaneous] : Spontaneous conception [Sonogram] : sonogram [at ___ wks] : at [unfilled] weeks [Definite:  ___ (Date)] : the last menstrual period was [unfilled] [Normal Amount/Duration] : was of a normal amount and duration [Regular Cycle Intervals] : periods have been regular [Frequency: Q ___ days] : menstrual periods occur approximately every [unfilled] days [Menarche Age: ____] : age at menarche was [unfilled] [___] : no pregnancy complications reported [FreeTextEntry1] : #7 2018 SAB  8 wks no tx/no testing\par #8 2019  SAB  8wks nO TX/no testing\par #9 2020  SAB 8 wks no tx/no testing\par # 10 2021 SAB 8 wks no tx/no testing\par \par Justyna is a 37 y/o G(11P1091 at 31+1 WGA returning for a follow-up MFM visit for management of gestational diabetes (GDMA2).   She had genetic counseling earlier in the pregnancy on 4/29/22 and declined prenatal diagnostic testing and NIPS.  She had a maternal-fetal medicine consultation on 5/11/2022 because of her advanced maternal age, recurrent pregnancy losses and Low LUANN-A level on the first trimester screen test.  She had a follow-up genetic counseling session on 6/9/2022 due to the reported elevated maternal serum alpha-fetoprotein during the sequential screen testing.  She had a follow-up MFM visit on July 13, 2022 because of elevated maternal serum  AFP and inhibin A levels during the second trimester screening test.  Her 1 hour glucose challenge test done on July 6, 2022 reported a glucose of 162. The 3-hour GTT done on July 13, 2022 reported a fasting glucose of 90, 1 hour glucose 229, 2-hour glucose 178, and a 3-hour glucose of 87.  There were 2 abnormal glucose values consistent with a diagnosis of gestational diabetes.      [Spotting Between  Menses] : no spotting between menses [Menstrual Cramps] : no menstrual cramps [On BCP at conception] : the patient was not on BCP at conception

## 2022-08-24 NOTE — REASON FOR VISIT
[Nursing Assessment] : Nursing Assessment [FreeTextEntry2] : [facility] f/u due to metformin controlled gestational diabetes.  Pt states she was just increased on 8/15/22 to 1000 mg at bedtime.  BS attached.  Pt states she is testing 4x a day, 1 hr after meals.  Pt has f/u with dietary on 9/13/22 and A1C on 8/3/22 which was 5.1%.  Pt has elevated inhibin A and elevated AFP.  Pt is taking a baby aspirin and alternating between 1-2 tabs.  Pt had an MRI due to right echogenic lung on 6/27/22- attached.  Good FM, no ctx pain or vaginal bleeding.  FHR confirmed which was 140 bpm.

## 2022-08-24 NOTE — DISCUSSION/SUMMARY
[FreeTextEntry1] : 37 y/o G(11P1069 at 31 weeks and 1 day gestation returning for a follow-up [facility] visit for management of gestational diabetes (GDMA2), increased inhibin A, decreased LUANN-A, and echogenic right lung on ultrasound.\par \par 1)  Gestational Diabetes (GDMA2) -  Justyna is currently on metformin 1000 mg at night.  Her last appointment with dietary was on 8/15 and at that time her metformin dose was increased from 500 mg to 1000 mg at night. Her last HA1C was 5.1% on 8/3.  EFW was at the 52nd percentile on .  She is on aspirin 81/162 mg daily for pre-eclampsia prophylaxis.  She states that she has been following the recommended diabetic diet. She performs fasting and 1 hour postprandial self glucose monitoring from the beginning of the meal.  On review of her blood glucose log she is having mildly elevated morning fasting blood glucose values of 91- 98 and normal 1 hour postprandial values.  She has been taking the fasting values between 4-5 am in the morning and we recommend she start taking these values around 7-8 am instead.  We informed her that maintaining euglycemia is the most important factor associated with good  outcomes in pregnancies complicated by gestational diabetes.  We told her that poor glucose control may cause fetal macrosomia, shoulder dystocia,  delivery, stillbirth,  respiratory distress syndrome and  metabolic complications such as hypoglycemia and hyperbilirubinemia.  We also told her that she is at risk for developing type 2 diabetes, metabolic syndrome and cardiovascular disease later in life.  We also recommend having a 75 gram 2 hour OGTT approximately 6 - 8 weeks postpartum to determine whether she has impaired glucose tolerance not diagnosed prior to the pregnancy.   Her next dietary appointment is on 22.  We recommend continued weekly fetal testing and continuing aspirin 81/162 mg alternating every other day.  Timing of delivery currently is recommended at 38 0/7 - 39 0/7 weeks gestation in the setting of suboptimally controlled gestational diabetes with increased inhibin/decreased LUANN-A\par \par 2)  Echogenic right lung on ultrasound -  Justyna is status post a normal fetal MRI on 22. \par \par We will plan on seeing Justyna back for a Maternal Fetal Medicine follow-up visit in 3 weeks. \par \par At the end of our discussion, the patient indicated that her questions were answered, and she seemed satisfied with our discussion. Please do not hesitate to contact us with any questions. \par \par Sincerely, \par \par Dennis Vivas MD,\par Maternal Fetal Medicine Fellow\par \par Hamilton Whiteside MD,\par Maternal-Fetal Medicine Attending\par \par \par \par \par \par \par \par

## 2022-08-26 ENCOUNTER — NON-APPOINTMENT (OUTPATIENT)
Age: 37
End: 2022-08-26

## 2022-08-26 ENCOUNTER — OUTPATIENT (OUTPATIENT)
Dept: INPATIENT UNIT | Facility: HOSPITAL | Age: 37
LOS: 1 days | End: 2022-08-26
Payer: COMMERCIAL

## 2022-08-26 VITALS — OXYGEN SATURATION: 100 % | HEART RATE: 84 BPM

## 2022-08-26 VITALS — DIASTOLIC BLOOD PRESSURE: 62 MMHG | SYSTOLIC BLOOD PRESSURE: 104 MMHG | HEART RATE: 96 BPM

## 2022-08-26 DIAGNOSIS — O47.03 FALSE LABOR BEFORE 37 COMPLETED WEEKS OF GESTATION, THIRD TRIMESTER: ICD-10-CM

## 2022-08-26 LAB
APPEARANCE UR: CLEAR — SIGNIFICANT CHANGE UP
BACTERIA # UR AUTO: ABNORMAL
BILIRUB UR-MCNC: NEGATIVE — SIGNIFICANT CHANGE UP
COLOR SPEC: YELLOW — SIGNIFICANT CHANGE UP
DIFF PNL FLD: ABNORMAL
EPI CELLS # UR: ABNORMAL
FIBRONECTIN FETAL SPEC QL: NEGATIVE — SIGNIFICANT CHANGE UP
GLUCOSE UR QL: NEGATIVE MG/DL — SIGNIFICANT CHANGE UP
KETONES UR-MCNC: ABNORMAL
LEUKOCYTE ESTERASE UR-ACNC: NEGATIVE — SIGNIFICANT CHANGE UP
NITRITE UR-MCNC: NEGATIVE — SIGNIFICANT CHANGE UP
PH UR: 6.5 — SIGNIFICANT CHANGE UP (ref 5–8)
PROT UR-MCNC: NEGATIVE — SIGNIFICANT CHANGE UP
RBC CASTS # UR COMP ASSIST: SIGNIFICANT CHANGE UP /HPF (ref 0–4)
SP GR SPEC: 1.01 — SIGNIFICANT CHANGE UP (ref 1.01–1.02)
UROBILINOGEN FLD QL: NEGATIVE MG/DL — SIGNIFICANT CHANGE UP
WBC UR QL: SIGNIFICANT CHANGE UP /HPF (ref 0–5)

## 2022-08-26 PROCEDURE — 81001 URINALYSIS AUTO W/SCOPE: CPT

## 2022-08-26 PROCEDURE — 99212 OFFICE O/P EST SF 10 MIN: CPT | Mod: GC

## 2022-08-26 PROCEDURE — 59025 FETAL NON-STRESS TEST: CPT

## 2022-08-26 PROCEDURE — 87653 STREP B DNA AMP PROBE: CPT

## 2022-08-26 PROCEDURE — G0463: CPT

## 2022-08-26 PROCEDURE — 87800 DETECT AGNT MULT DNA DIREC: CPT

## 2022-08-26 PROCEDURE — 82731 ASSAY OF FETAL FIBRONECTIN: CPT

## 2022-08-26 PROCEDURE — 36415 COLL VENOUS BLD VENIPUNCTURE: CPT

## 2022-08-26 NOTE — OB PROVIDER TRIAGE NOTE - NSHPPHYSICALEXAM_GEN_ALL_CORE
HR: 96 (26 Aug 2022 16:21) (96 - 96)  BP: 104/62 (26 Aug 2022 16:21) (104/62 - 104/62)      CV: RRR  Pulm: breathing comfortably on RA  Abd: gravid, nontender  Extr: moving all extremities with ease  – Spec: small amounts of thin white discharge and cervix appears closed on exam   – VE: closed/soft  – FHT: baseline 135, mod variability, + accels  -decels  – [place]: Irregular

## 2022-08-26 NOTE — OB RN TRIAGE NOTE - NSICDXPASTMEDICALHX_GEN_ALL_CORE_FT
PAST MEDICAL HISTORY:  Gestational diabetes, diet controlled     No pertinent past medical history

## 2022-08-26 NOTE — OB PROVIDER TRIAGE NOTE - HISTORY OF PRESENT ILLNESS
R1 Triage Note    Subjective  HPI: yoF GP at gestational age presents for _. +FM. -LOF. -CTXs. -VB. Pt denies any other concerns.    – PNC: Denies prenatal issues. GBS _.  EFW _g by sono.  – OBHx:   – GynHx: denies  – PMH: denies  – PSH: denies  – Psych: denies   – Social: denies   – Meds: PNV   – Allergies: NKDA    Objective  – Vital Signs  BP:   HR:   Temp:   – PE:   CV: RRR  Pulm: breathing comfortably on RA  Abd: gravid, nontender  Extr: moving all extremities with ease  – FS:   – Spec: pooling, nitrazine, ferning, bleeding,  (lesions if patient with HSV2 history)  – VE: //  – FHT: baseline 1, mod variability, +accels, -decels  – [place]: qmin  – EFW: _g by sono  – Sono: vertex    Assessment  No prenatal issues. GBS _.    Plan  –    Patient discussed with attending physician, Dr. Jorden Leal, PGY1 37 yo F  @      at gestational age presents for _. +FM. -LOF. -CTXs. -VB. Pt denies any other concerns.    – PNC: Denies prenatal issues. GBS _.  EFW _g by sono.  – OBHx:   – GynHx: denies  – PMH: denies  – PSH: denies  – Psych: denies   – Social: denies   – Meds: PNV   – Allergies: NKDA    Objective  – Vital Signs  BP:   HR:   Temp:   – PE:   CV: RRR  Pulm: breathing comfortably on RA  Abd: gravid, nontender  Extr: moving all extremities with ease  – FS:   – Spec: pooling, nitrazine, ferning, bleeding,  (lesions if patient with HSV2 history)  – VE: //  – FHT: baseline 1, mod variability, +accels, -decels  – [place]: qmin  – EFW: _g by sono  – Sono: vertex    Assessment  No prenatal issues. GBS _.    Plan  –    Patient discussed with attending physician, Dr. Jorden Leal, PGY1 37 yo F  @ 31wk 3d EDC 10/25/22 presents with reports of contractions rating 9/10 every 10 mins since this 0800 AM      at gestational age presents for _. +FM. -LOF. -CTXs. -VB. Pt denies any other concerns.    – PNC: Denies prenatal issues. GBS _.  EFW _g by sono.  – OBHx:   – GynHx: denies  – PMH: denies  – PSH: denies  – Psych: denies   – Social: denies   – Meds: PNV   – Allergies: NKDA    Objective  – Vital Signs  BP:   HR:   Temp:   – PE:   CV: RRR  Pulm: breathing comfortably on RA  Abd: gravid, nontender  Extr: moving all extremities with ease  – FS:   – Spec: pooling, nitrazine, ferning, bleeding,  (lesions if patient with HSV2 history)  – VE: //  – FHT: baseline 1, mod variability, +accels, -decels  – [place]: qmin  – EFW: _g by sono  – Sono: vertex    Assessment  No prenatal issues. GBS _.    Plan  –    Patient discussed with attending physician, Dr. Jorden Leal, PGY1 35 yo F  @ 31wk 3d EDC 10/25/22 presents with reports of contractions rating 9/10 every 10 mins since this 0800 AM and pelvic pressure. Patient also reports SOB when standing and occasional dizziness for about a month now. Denies vaginal bleeding, urinary sx, N/V/D and endorses + FM.  Pt denies any other concerns.    – PNC: A2GDM on Metformin  – OBHx: 1 uncomplicated , 5 SAB, 2 TOP   – GynHx: denies  – PMH: denies  – PSH: denies  – Psych: denies   – Social: denies x3  – Meds: PNV , Metformin  – Allergies: NKDA      Assessment  No prenatal issues. GBS _.    Plan  –    Patient discussed with attending physician, Dr. Jorden Leal, PGY1 37 yo F  @ 31wk 3d EDC 10/25/22 presents with reports of contractions rating 9/10 every 10 mins since this 0800 AM and pelvic pressure. Patient also reports SOB when standing and occasional dizziness for about a month now. pt now currently sob. Denies vaginal bleeding, urinary sx, N/V/D and endorses + FM.  Pt denies any other concerns.    – PNC: A2GDM on Metformin  – OBHx: 1 uncomplicated , 5 SAB, 2 TOP   – GynHx: denies  – PMH: denies  – PSH: denies  – Psych: denies   – Social: denies x3  – Meds: PNV , Metformin, ASA  – Allergies: NKDA

## 2022-08-26 NOTE — OB PROVIDER TRIAGE NOTE - ATTENDING COMMENTS
In summary, 37 yo F  @ 31w3d presenting with abdominal cramping. Pt seen by Dr Stephenson with cervical length done showing questionable funneling and FFN collected. After observation pt reports feeling improved. FFN negative and urinalysis collected. Repeat exam 0.5/long/ high. Patient encouraged to PO hydrate and will follow up results of urine culture. Vital signs reviewed. Patient to follow up with provider at next scheduled visit.  labor precautions reviewed.    Kaylan Camarillo PGY5 In summary, 35 yo F  @ 31w3d presenting with abdominal cramping. Pt seen by Dr Stephenson with cervical length done showing questionable funneling and FFN collected. After observation pt reports feeling improved. FFN negative and urinalysis collected. Repeat exam 0.5/long/ high. Patient encouraged to PO hydrate and will follow up results of urine culture. Vital signs reviewed. Patient to follow up with provider at next scheduled visit.  labor precautions reviewed.    Kaylan Camarillo MD

## 2022-08-26 NOTE — OB PROVIDER TRIAGE NOTE - NSOBPROVIDERNOTE_OBGYN_ALL_OB_FT
35 yo F  @ 31wk 3d EDC 10/25/22 presents to triage for rule  labor    - UA sent   - Cat 1 FHRt   - Transvaginal US: Difficult exam, limited images obtained, but funneling noted,  closed cervical length was measured  approximately 2 cm, FFN sent and decision made to consult MFM   - Affirm culture sent  - GBS culture sent   - Spec; Thin white discharge noted, cervix appears to be closed   - SVE: closed/soft   - VSS    Patient seen and discussed with attending physician, Dr. Stephenson 35 yo F  @ 31wk 3d EDC 10/25/22 presents to triage for rule  labor    - UA sent   - Cat 1 FHRt   - Transvaginal US: Difficult exam, limited images obtained, but funneling noted,  closed cervical length was measured  approximately 2 cm, FFN sent and decision made to consult MFM   - Affirm culture sent  - GBS culture sent   - Spec; Thin white discharge noted, cervix appears to be closed   - SVE: closed/soft   - VSS    Patient seen and discussed with attending physician, Dr. Stephenson        Addendum:   - UA: + for ketones, some bacteria; - for nitrites, leukocyte esterase. No treatment necessary at this time, will f/u UCx results and notify patient via phone  - Will also f/u with patient regarding GBS, affirm results  - Patient no longer vincenzo on toco, FT/long/posterior on exam. Patient to ensure adequate hydration. To be discharged home with reminder to stay hydrated and to return to triage if begins experiencing painful contractions again, vaginal bleeding, large volume leakage of fluid, decreased fetal movement, or any other concerns.  - Patient to follow up with Dr. Rojas for repeat TVUS to repeat cervical length    D/w Dr. Camarillo

## 2022-08-26 NOTE — OB RN TRIAGE NOTE - FALL HARM RISK - UNIVERSAL INTERVENTIONS
Bed in lowest position, wheels locked, appropriate side rails in place/Call bell, personal items and telephone in reach/Instruct patient to call for assistance before getting out of bed or chair/Non-slip footwear when patient is out of bed/[place] to call system/Physically safe environment - no spills, clutter or unnecessary equipment/Purposeful Proactive Rounding/Room/bathroom lighting operational, light cord in reach

## 2022-08-27 LAB
CANDIDA AB TITR SER: SIGNIFICANT CHANGE UP
G VAGINALIS DNA SPEC QL NAA+PROBE: DETECTED
T VAGINALIS SPEC QL WET PREP: SIGNIFICANT CHANGE UP

## 2022-08-28 LAB
GROUP B BETA STREP DNA (PCR): DETECTED
GROUP B BETA STREP INTERPRETATION: SIGNIFICANT CHANGE UP
SOURCE GROUP B STREP: SIGNIFICANT CHANGE UP

## 2022-08-29 PROBLEM — O24.410 GESTATIONAL DIABETES MELLITUS IN PREGNANCY, DIET CONTROLLED: Chronic | Status: ACTIVE | Noted: 2022-08-26

## 2022-08-31 ENCOUNTER — ASOB RESULT (OUTPATIENT)
Age: 37
End: 2022-08-31

## 2022-08-31 ENCOUNTER — APPOINTMENT (OUTPATIENT)
Dept: ANTEPARTUM | Facility: CLINIC | Age: 37
End: 2022-08-31

## 2022-08-31 ENCOUNTER — NON-APPOINTMENT (OUTPATIENT)
Age: 37
End: 2022-08-31

## 2022-08-31 PROCEDURE — ZZZZZ: CPT

## 2022-08-31 PROCEDURE — 76818 FETAL BIOPHYS PROFILE W/NST: CPT

## 2022-09-01 DIAGNOSIS — Z3A.31 31 WEEKS GESTATION OF PREGNANCY: ICD-10-CM

## 2022-09-05 RX ORDER — METRONIDAZOLE 500 MG
1 TABLET ORAL
Qty: 14 | Refills: 0
Start: 2022-09-05 | End: 2022-09-11

## 2022-09-05 NOTE — CHART NOTE - NSCHARTNOTEFT_GEN_A_CORE
Pt called to discuss results  GBS positive - will require abx during labor course  gardnerella positive - sent flagyl to pharmacy  patient without OB complaints on phone  next appt is tomorrow, encouraged to keep

## 2022-09-06 ENCOUNTER — APPOINTMENT (OUTPATIENT)
Dept: OBGYN | Facility: CLINIC | Age: 37
End: 2022-09-06

## 2022-09-06 VITALS
DIASTOLIC BLOOD PRESSURE: 70 MMHG | SYSTOLIC BLOOD PRESSURE: 130 MMHG | WEIGHT: 162.38 LBS | HEIGHT: 64 IN | BODY MASS INDEX: 27.72 KG/M2

## 2022-09-06 PROCEDURE — 0502F SUBSEQUENT PRENATAL CARE: CPT

## 2022-09-07 ENCOUNTER — APPOINTMENT (OUTPATIENT)
Dept: ANTEPARTUM | Facility: CLINIC | Age: 37
End: 2022-09-07

## 2022-09-07 ENCOUNTER — ASOB RESULT (OUTPATIENT)
Age: 37
End: 2022-09-07

## 2022-09-07 DIAGNOSIS — Z3A.32 32 WEEKS GESTATION OF PREGNANCY: ICD-10-CM

## 2022-09-07 PROCEDURE — ZZZZZ: CPT

## 2022-09-07 PROCEDURE — 76818 FETAL BIOPHYS PROFILE W/NST: CPT

## 2022-09-12 ENCOUNTER — APPOINTMENT (OUTPATIENT)
Dept: MATERNAL FETAL MEDICINE | Facility: CLINIC | Age: 37
End: 2022-09-12

## 2022-09-12 ENCOUNTER — ASOB RESULT (OUTPATIENT)
Age: 37
End: 2022-09-12

## 2022-09-12 PROCEDURE — G0108 DIAB MANAGE TRN  PER INDIV: CPT | Mod: 95

## 2022-09-13 RX ORDER — METFORMIN HYDROCHLORIDE 500 MG/1
500 TABLET, COATED ORAL
Qty: 1 | Refills: 3 | Status: ACTIVE | COMMUNITY
Start: 2022-08-03 | End: 1900-01-01

## 2022-09-14 ENCOUNTER — APPOINTMENT (OUTPATIENT)
Dept: ANTEPARTUM | Facility: CLINIC | Age: 37
End: 2022-09-14

## 2022-09-14 ENCOUNTER — ASOB RESULT (OUTPATIENT)
Age: 37
End: 2022-09-14

## 2022-09-14 PROCEDURE — ZZZZZ: CPT

## 2022-09-14 PROCEDURE — 76816 OB US FOLLOW-UP PER FETUS: CPT | Mod: 59

## 2022-09-14 PROCEDURE — 76818 FETAL BIOPHYS PROFILE W/NST: CPT

## 2022-09-19 ENCOUNTER — NON-APPOINTMENT (OUTPATIENT)
Age: 37
End: 2022-09-19

## 2022-09-19 ENCOUNTER — APPOINTMENT (OUTPATIENT)
Dept: ANTEPARTUM | Facility: CLINIC | Age: 37
End: 2022-09-19

## 2022-09-19 ENCOUNTER — APPOINTMENT (OUTPATIENT)
Dept: MATERNAL FETAL MEDICINE | Facility: CLINIC | Age: 37
End: 2022-09-19
Payer: COMMERCIAL

## 2022-09-19 ENCOUNTER — ASOB RESULT (OUTPATIENT)
Age: 37
End: 2022-09-19

## 2022-09-19 PROCEDURE — 76820 UMBILICAL ARTERY ECHO: CPT | Mod: 59

## 2022-09-19 PROCEDURE — 76819 FETAL BIOPHYS PROFIL W/O NST: CPT

## 2022-09-19 PROCEDURE — G0108 DIAB MANAGE TRN  PER INDIV: CPT | Mod: 95

## 2022-09-19 PROCEDURE — 93976 VASCULAR STUDY: CPT

## 2022-09-20 ENCOUNTER — APPOINTMENT (OUTPATIENT)
Dept: OBGYN | Facility: CLINIC | Age: 37
End: 2022-09-20

## 2022-09-20 VITALS
SYSTOLIC BLOOD PRESSURE: 104 MMHG | DIASTOLIC BLOOD PRESSURE: 65 MMHG | WEIGHT: 165.19 LBS | BODY MASS INDEX: 28.2 KG/M2 | HEIGHT: 64 IN

## 2022-09-20 PROCEDURE — 0502F SUBSEQUENT PRENATAL CARE: CPT

## 2022-09-21 ENCOUNTER — APPOINTMENT (OUTPATIENT)
Dept: ANTEPARTUM | Facility: CLINIC | Age: 37
End: 2022-09-21

## 2022-09-21 PROCEDURE — 59025 FETAL NON-STRESS TEST: CPT

## 2022-09-22 DIAGNOSIS — Z3A.35 35 WEEKS GESTATION OF PREGNANCY: ICD-10-CM

## 2022-09-23 LAB
GP B STREP DNA SPEC QL NAA+PROBE: DETECTED
GP B STREP DNA SPEC QL NAA+PROBE: NORMAL
HIV1+2 AB SPEC QL IA.RAPID: NONREACTIVE
SOURCE GBS: NORMAL
T PALLIDUM AB SER QL IA: NEGATIVE

## 2022-09-26 ENCOUNTER — NON-APPOINTMENT (OUTPATIENT)
Age: 37
End: 2022-09-26

## 2022-09-27 ENCOUNTER — APPOINTMENT (OUTPATIENT)
Dept: OBGYN | Facility: CLINIC | Age: 37
End: 2022-09-27

## 2022-09-27 VITALS
WEIGHT: 165.5 LBS | BODY MASS INDEX: 28.25 KG/M2 | SYSTOLIC BLOOD PRESSURE: 116 MMHG | HEIGHT: 64 IN | DIASTOLIC BLOOD PRESSURE: 71 MMHG

## 2022-09-27 DIAGNOSIS — Z3A.36 36 WEEKS GESTATION OF PREGNANCY: ICD-10-CM

## 2022-09-27 PROCEDURE — 0502F SUBSEQUENT PRENATAL CARE: CPT

## 2022-09-28 ENCOUNTER — APPOINTMENT (OUTPATIENT)
Dept: ANTEPARTUM | Facility: CLINIC | Age: 37
End: 2022-09-28

## 2022-09-28 ENCOUNTER — APPOINTMENT (OUTPATIENT)
Dept: MATERNAL FETAL MEDICINE | Facility: CLINIC | Age: 37
End: 2022-09-28

## 2022-09-28 ENCOUNTER — ASOB RESULT (OUTPATIENT)
Age: 37
End: 2022-09-28

## 2022-09-28 VITALS
OXYGEN SATURATION: 98 % | HEART RATE: 89 BPM | BODY MASS INDEX: 28 KG/M2 | HEIGHT: 64 IN | DIASTOLIC BLOOD PRESSURE: 60 MMHG | SYSTOLIC BLOOD PRESSURE: 104 MMHG | WEIGHT: 164 LBS | RESPIRATION RATE: 18 BRPM

## 2022-09-28 PROCEDURE — 99214 OFFICE O/P EST MOD 30 MIN: CPT

## 2022-09-28 PROCEDURE — 93976 VASCULAR STUDY: CPT

## 2022-09-28 PROCEDURE — 76818 FETAL BIOPHYS PROFILE W/NST: CPT

## 2022-09-28 PROCEDURE — 76820 UMBILICAL ARTERY ECHO: CPT | Mod: 59

## 2022-09-28 PROCEDURE — ZZZZZ: CPT

## 2022-09-28 NOTE — DISCUSSION/SUMMARY
[FreeTextEntry1] : We had the pleasure of seeing your patient for a Maternal-Fetal Medicine consultation today. She is a 36 year-old  at 36 1/7 weeks of gestation with a pregnancy complicated by the below issues.\par \par She was extensively counseled regarding the following issues:\par \par Gestational diabetes, controlled on oral hypoglycemic medication (metformin): The patient was counseled regarding diet, blood sugar testing, and managing diabetes during pregnancy. Tight glycemic control in pregnancy is necessary to decrease fetal and  risks including macrosomia, shoulder dystocia, medically or obstetrically-indicated  delivery,  section, polyhydramnios, and preeclampsia. It was discussed that women who are able to maintain good glycemic control with diet and/or medication generally have favorable obstetric outcomes. She understands that fasting glucose values should be <95 and 1-hour postprandial values should be <140. Fingersticks should be checked and logged 4 times daily. Currently, her fasting and postprandial glucose values are at goal on metformin 1500 mg at bedtime. There is a 50% chance that she will acquire diabetes in her lifetime due to her diagnosis of diabetes in pregnancy. She is encouraged to have a two-hour glucose tolerance test at 6-8 weeks postpartum to evaluate for diabetes mellitus and insulin resistance. Delivery recommended between 39 0/7 – 39 6/7 weeks unless otherwise indicated earlier. Weekly fetal testing until delivery. Nutrition appt 10/10/22.\par \par \par Thank you for requesting a consultation on this patient. The total time spent in preparation for this visit, medical history taking, orders, review of records, counseling the patient, and writing this note was 30 minutes.\par \par At the end of our discussion, the patient indicated that her questions were answered and she seemed satisfied with our discussion. Please do not hesitate to contact us with any questions.\par \par Sincerely,\par \par \par Pedro Luis Booker MD, [facility]\par Attending Physician, Maternal-Fetal Medicine\par \par

## 2022-10-04 ENCOUNTER — OUTPATIENT (OUTPATIENT)
Dept: INPATIENT UNIT | Facility: HOSPITAL | Age: 37
LOS: 1 days | End: 2022-10-04
Payer: COMMERCIAL

## 2022-10-04 ENCOUNTER — APPOINTMENT (OUTPATIENT)
Dept: OBGYN | Facility: CLINIC | Age: 37
End: 2022-10-04

## 2022-10-04 VITALS — HEART RATE: 83 BPM | DIASTOLIC BLOOD PRESSURE: 62 MMHG | SYSTOLIC BLOOD PRESSURE: 113 MMHG

## 2022-10-04 VITALS
HEIGHT: 64 IN | DIASTOLIC BLOOD PRESSURE: 70 MMHG | WEIGHT: 166.6 LBS | BODY MASS INDEX: 28.44 KG/M2 | SYSTOLIC BLOOD PRESSURE: 124 MMHG

## 2022-10-04 DIAGNOSIS — R53.1 WEAKNESS: ICD-10-CM

## 2022-10-04 DIAGNOSIS — Z3A.37 37 WEEKS GESTATION OF PREGNANCY: ICD-10-CM

## 2022-10-04 DIAGNOSIS — O47.1 FALSE LABOR AT OR AFTER 37 COMPLETED WEEKS OF GESTATION: ICD-10-CM

## 2022-10-04 LAB — GLUCOSE BLDC GLUCOMTR-MCNC: 77 MG/DL — SIGNIFICANT CHANGE UP (ref 70–99)

## 2022-10-04 PROCEDURE — 59025 FETAL NON-STRESS TEST: CPT

## 2022-10-04 PROCEDURE — 0502F SUBSEQUENT PRENATAL CARE: CPT

## 2022-10-04 PROCEDURE — 82962 GLUCOSE BLOOD TEST: CPT

## 2022-10-04 PROCEDURE — 59050 FETAL MONITOR W/REPORT: CPT

## 2022-10-04 PROCEDURE — G0463: CPT

## 2022-10-04 NOTE — OB RN TRIAGE NOTE - TERM DELIVERIES, OB PROFILE
ATTENDING NOTE:   8 m/o F with no PMH, no known allergies, presenting with rash, redness and itchiness of face, chest, arms and the back of the neck started 3 hours after eating egg yolk. All other SX resolved and only presents with mild redness of b/l cheeks without intervention. On exam: pt is normally interactive, playful and energetic, in NAD, NCAT. HEENT: MMM, No tongue swelling. EOMI, PERRLA. Neck: supple, nontender, NL ROM. Heart: RRR, no murmur.  Lungs: BCTA, no signs of increased WOB. No stridor. Abd: NTND, no guarding or rebound, no hernia palpated, no organomegaly, or CVAT. MSK: chest, back, and ext nontender, NL rom, no deformity. Neuro: Alert, cooperative, DOWELL equally, normal behavior, interaction and coordination for age. Skin:  (+) erythema to b/l cheeks, scant urticarial rash to chest. A/P: Pt was given Benadryl will observe for improvement. Likely a mild allergic reaction with spontaneous resolution, will follow for improvement. Advised to follow up with allergist. Given care instructions and return precautions. 1

## 2022-10-04 NOTE — OB RN TRIAGE NOTE - NS_LASTFOOD_OBGYN_ALL_OB_FT
Diabetes    Diverticulosis    GERD (Gastroesophageal Reflux Disease)    Glaucoma    Morbid Obesity    IRMA (Obstructive Sleep Apnea)    PAF (Paroxysmal Atrial Fibrillation)    Peptic Ulcer Disease    Sinusitis popcorn water

## 2022-10-04 NOTE — OB PROVIDER TRIAGE NOTE - NSOBPROVIDERNOTE_OBGYN_ALL_OB_FT
SURINDER ORTIZ is a 36y  at 37 weeks GA who presents to L&D for decreased fetal movement.     #dec fetal movement  - vital signs stable  - BPP 10/10   - Patient has appointment tomorrow 10/5 with PATO, advised to keep follow up appointment with Dr. Rojas next week.     Dispo: d/c to home    Discussed with Dr. Nicholson SURINDER ORTIZ is a 36y  at 37 weeks GA who was sent to L&D from office for decreased fetal movement.     #dec fetal movement  - vital signs stable  - patient currently reports feeling fetal movement  - BPP 10/10    #dispo  - Patient to follow up tomorrow 10/5 with MFM as scheduled. To see Dr. Rojas next week.   - Counseling regarding fetal movement provided.  - Return precautions given. Patient to return to triage if any concerns for decreased fetal movement, contraction pain increasing in frequency and intensity, leakage of fluids, vaginal bleeding, or any other concerns.     Dispo: d/c to home    Discussed with Dr. Nicholson

## 2022-10-04 NOTE — OB PROVIDER TRIAGE NOTE - NSHPPHYSICALEXAM_GEN_ALL_CORE
T(C): 36.7 (10-04-22 @ 14:34), Max: 36.9 (10-04-22 @ 13:52)  HR: 83 (10-04-22 @ 15:19) (83 - 90)  BP: 113/62 (10-04-22 @ 15:19) (113/62 - 116/62)  RR: 18 (10-04-22 @ 14:34) (18 - 19)    Gen: NAD, well-appearing  Abd: soft, gravid  Ext: non-edematous, non-tender     FHT: baseline 130, moderate variability, +accels, -decels. Cat 1 tracing  [place]: no contractions    Bedside sono: vertex, anterior placenta  BPP 10/10 T(C): 36.7 (10-04-22 @ 14:34), Max: 36.9 (10-04-22 @ 13:52)  HR: 83 (10-04-22 @ 15:19) (83 - 90)  BP: 113/62 (10-04-22 @ 15:19) (113/62 - 116/62)  RR: 18 (10-04-22 @ 14:34) (18 - 19)    Gen: NAD, well-appearing  Abd: soft, non tender, gravid  Ext: non-edematous, non-tender     FHT: baseline 130, moderate variability, +accels, -decels  [place]: no contractions    Bedside sono: vertex, anterior placenta, MADI 13.4, BPP 10/10

## 2022-10-04 NOTE — OB PROVIDER TRIAGE NOTE - HISTORY OF PRESENT ILLNESS
SURINDER ORTIZ is a 36y  at 37 weeks GA who presents to L&D for decreased fetal movement.     The patient reports seeing OB Dr. Rojas in office today. She reports that at 11am she was placed on NST at the office and was told that the baby was not moving well and was recommended to come in to L&D. Patient reports that she feels less fetal movement than usual for the past 2 days.  Pt denies vaginal bleeding, contractions, and leakage of fluid. Pt denies headaches, visual disturbances, RUQ pain, epigastric pain and new-onset edema. She denies fevers, chills, nausea, vomiting. No other complaints at this time. She has a follow up appointment with Dr. Rojas next week and an appointment with PATO tomorrow.     EDC: 10/25/22    Pregnancy course is significant for: gestational diabetes    POB:  2017 FT VD; complicated by gestational diabetes  SABx5  TOPx1  PGYN: -fibroids/-cysts, denied STD hx, denies abnormal PAPs  PMH: gestational diabetes  PSH: denies  SH: Denies tobacco use, EtOH use and illicit drug use during the pregnancy; Feels safe at home  Meds: PNV, metformin, aspirin  All: NKDA SURINDER ORTIZ is a 36y  at 37 weeks GA who was sent to L&D from office for decreased fetal movement. Patient reports that she has been feeling less fetal movement than usual for the past 2 days.  In triage she reports that she is currently feeling small movements, but not the big movements she was feeling earlier in pregnancy.    Pt denies vaginal bleeding, contractions, and leakage of fluid. Pt denies headaches, visual disturbances, RUQ pain, epigastric pain and new-onset edema. She denies fevers, chills, nausea, vomiting. No other complaints at this time. She has a follow up appointment with Dr. Rojas next week and an appointment with PRUDENCIO tomorrow.     JUAN: 10/25/22    Pregnancy course is significant for:   GDMA2, on metformin    POB:  2017 FT VD; complicated by gestational diabetes  SABx5  TOPx1  PGYN: -fibroids/-cysts, denied STD hx, denies abnormal PAPs  PMH: gestational diabetes  PSH: denies  SH: Denies tobacco use, EtOH use and illicit drug use during the pregnancy; Feels safe at home  Meds: PNV, metformin, aspirin  All: NKDA

## 2022-10-05 ENCOUNTER — APPOINTMENT (OUTPATIENT)
Dept: ANTEPARTUM | Facility: CLINIC | Age: 37
End: 2022-10-05

## 2022-10-07 ENCOUNTER — APPOINTMENT (OUTPATIENT)
Dept: ANTEPARTUM | Facility: CLINIC | Age: 37
End: 2022-10-07

## 2022-10-07 ENCOUNTER — ASOB RESULT (OUTPATIENT)
Age: 37
End: 2022-10-07

## 2022-10-07 PROCEDURE — ZZZZZ: CPT

## 2022-10-07 PROCEDURE — 93976 VASCULAR STUDY: CPT

## 2022-10-07 PROCEDURE — 76820 UMBILICAL ARTERY ECHO: CPT | Mod: 59

## 2022-10-07 PROCEDURE — 76818 FETAL BIOPHYS PROFILE W/NST: CPT

## 2022-10-10 ENCOUNTER — APPOINTMENT (OUTPATIENT)
Dept: MATERNAL FETAL MEDICINE | Facility: CLINIC | Age: 37
End: 2022-10-10
Payer: COMMERCIAL

## 2022-10-10 ENCOUNTER — ASOB RESULT (OUTPATIENT)
Age: 37
End: 2022-10-10

## 2022-10-10 PROCEDURE — G0108 DIAB MANAGE TRN  PER INDIV: CPT | Mod: 95

## 2022-10-11 ENCOUNTER — APPOINTMENT (OUTPATIENT)
Dept: ANTEPARTUM | Facility: CLINIC | Age: 37
End: 2022-10-11

## 2022-10-11 ENCOUNTER — ASOB RESULT (OUTPATIENT)
Age: 37
End: 2022-10-11

## 2022-10-11 ENCOUNTER — APPOINTMENT (OUTPATIENT)
Dept: OBGYN | Facility: CLINIC | Age: 37
End: 2022-10-11

## 2022-10-11 VITALS
BODY MASS INDEX: 28.48 KG/M2 | WEIGHT: 166.8 LBS | DIASTOLIC BLOOD PRESSURE: 77 MMHG | HEIGHT: 64 IN | SYSTOLIC BLOOD PRESSURE: 124 MMHG

## 2022-10-11 PROCEDURE — 76818 FETAL BIOPHYS PROFILE W/NST: CPT

## 2022-10-11 PROCEDURE — 76820 UMBILICAL ARTERY ECHO: CPT | Mod: 59

## 2022-10-11 PROCEDURE — ZZZZZ: CPT

## 2022-10-11 PROCEDURE — 76816 OB US FOLLOW-UP PER FETUS: CPT | Mod: 59

## 2022-10-11 PROCEDURE — 0502F SUBSEQUENT PRENATAL CARE: CPT

## 2022-10-12 ENCOUNTER — APPOINTMENT (OUTPATIENT)
Dept: ANTEPARTUM | Facility: CLINIC | Age: 37
End: 2022-10-12

## 2022-10-12 ENCOUNTER — OUTPATIENT (OUTPATIENT)
Dept: INPATIENT UNIT | Facility: HOSPITAL | Age: 37
LOS: 1 days | End: 2022-10-12
Payer: COMMERCIAL

## 2022-10-12 VITALS — SYSTOLIC BLOOD PRESSURE: 126 MMHG | DIASTOLIC BLOOD PRESSURE: 70 MMHG | HEART RATE: 90 BPM

## 2022-10-12 VITALS
RESPIRATION RATE: 18 BRPM | HEART RATE: 95 BPM | TEMPERATURE: 98 F | DIASTOLIC BLOOD PRESSURE: 75 MMHG | SYSTOLIC BLOOD PRESSURE: 121 MMHG

## 2022-10-12 DIAGNOSIS — O47.1 FALSE LABOR AT OR AFTER 37 COMPLETED WEEKS OF GESTATION: ICD-10-CM

## 2022-10-12 PROCEDURE — G0463: CPT

## 2022-10-12 PROCEDURE — 59050 FETAL MONITOR W/REPORT: CPT

## 2022-10-12 PROCEDURE — 99213 OFFICE O/P EST LOW 20 MIN: CPT | Mod: GC

## 2022-10-12 NOTE — OB PROVIDER TRIAGE NOTE - HISTORY OF PRESENT ILLNESS
SURINDER ORTIZ is a 36y  at 38w1d  GA who was sent to L&D from office for decreased fetal movement. Patient reports that she has been feeling less fetal movement than usual for the past 2 days.  In triage she reports that she is currently feeling small movements, but not the big movements she was feeling earlier in pregnancy.    Pt denies vaginal bleeding, contractions, and leakage of fluid. Pt denies headaches, visual disturbances, RUQ pain, epigastric pain and new-onset edema. She denies fevers, chills, nausea, vomiting. No other complaints at this time. She has a follow up appointment with Dr. Rojas next week and an appointment with PRUDENCIO tomorrow.     JUAN: 10/25/22    Pregnancy course is significant for:   GDMA2, on metformin  Echogenic right lung (normal MRI)   High risk pregnancy with high inhibin A, 90th percentile,    POB:  2017 FT VD; complicated by gestational diabetes  SABx5  TOPx1  PGYN: -fibroids/-cysts, denied STD hx, denies abnormal PAPs  PMH: gestational diabetes  PSH: denies  SH: Denies tobacco use, EtOH use and illicit drug use during the pregnancy; Feels safe at home  Meds: PNV, metformin 1500mg qHS, aspirin  All: NKDA SURINDER ORTIZ is a 36y  at 38w1d by 1st tri US (22)  JUAN: 10/25/22, presenting to L&D w/ complaints of dark vaginal bleeding.    Patient was seen at Dr. Kendrick's office for routine OB visit, was examined, and found to be 3cm dilated. Since then, she is reporting intermittent contraction like pain with presence of dark brown vaginal spotting/mucous discharge. Denies any LOF. Reports good fetal movement. Pt denies headaches, visual disturbances, RUQ pain, epigastric pain and new-onset edema. She denies fevers, chills, nausea, vomiting. No other complaints at this time.       Pregnancy course is significant for:   GDMA2, on metformin  Echogenic right lung (normal MRI)  High risk pregnancy with high inhibin A, 90th percentile  Marginal insertion of umbilical cord  History of recurrent pregnancy loss  GBS positive        POB:  2017 FT VD; complicated by gestational diabetes  SABx6  TOPx1  PGYN: -fibroids/-cysts, denied STD hx, denies abnormal PAPs  PMH: gestational diabetes  PSH: denies  SH: Denies tobacco use, EtOH use and illicit drug use during the pregnancy; Feels safe at home  Meds: PNV, metformin 1500mg qHS, aspirin  All: NKDA

## 2022-10-12 NOTE — OB PROVIDER TRIAGE NOTE - NSHPPHYSICALEXAM_GEN_ALL_CORE
ICU Vital Signs Last 24 Hrs  T(C): 36.6 (12 Oct 2022 17:14), Max: 36.6 (12 Oct 2022 17:14)  T(F): 97.9 (12 Oct 2022 17:14), Max: 97.9 (12 Oct 2022 17:14)  HR: 95 (12 Oct 2022 17:28) (95 - 95)  BP: 121/75 (12 Oct 2022 17:28) (121/75 - 121/75)  RR: 18 (12 Oct 2022 17:14) (18 - 18)  O2 Parameters below as of 12 Oct 2022 17:14  Patient On (Oxygen Delivery Method): room air      PE  GEN: Moderate discomfort w/ contractions  ABD: Soft, nontender, gravid   US: Cephalic, anterior  SVE: 3.0/50/-2    FHT: 135 bpm, moderate variability + accels no decels present  [place]: CTX q 2-4min

## 2022-10-12 NOTE — OB PROVIDER TRIAGE NOTE - ATTENDING COMMENTS
36y  at 38w1d by 1st tri US (22)  JUAN: 10/25/22, presenting to L&D w/ complaints of dark vaginal bleeding; r/o labor. PT was seen and examined in the office and was 3/50, and starting having some spotting  VSS  SVE 3/50  37 y/o  @ 38+1 here for spotting r/o labor   - plan to observe and examine to assess if there is cervical change  - signed out to night MD Lizeth Denson MD

## 2022-10-12 NOTE — OB PROVIDER TRIAGE NOTE - NSOBPROVIDERNOTE_OBGYN_ALL_OB_FT
SURINDER ORTIZ is a 36y  at 38w1d by 1st tri US (22)  JUAN: 10/25/22, presenting to L&D w/ complaints of dark vaginal bleeding; r/o labor    Plan:  - VSS  - Reactive tracing  - SVE: 3.0/50/-2, [place] regularly. will reassess in 1 hr to asses for change  - Continuous FHT/[place]  - Maternal/fetal status reassuring    Plan d/w Dr. Malhotra SURINDER ORTIZ is a 36y  at 38w1d by 1st tri US (22)  JUAN: 10/25/22, presenting to L&D w/ complaints of dark vaginal bleeding; r/o labor    Plan:  - VSS  - Reactive tracing  - SVE: 3.0/50/-2, [place] regularly. will reassess in 1 hr to asses for change  - Continuous FHT/[place]  - Maternal/fetal status reassuring    Plan d/w Dr. Malhotra    ADDENDUM    -Patient re-examined at bedside. Reports feeling some contractions.  -SVE is unchanged. Patient was 3cm in the office this morning. 3/50/-2 in triage at 5:30pm, and 3/50/-2 in triage at 8pm.  -FHT is reactive  -[place] shows contractions every 3-6 minutes  -Spoke with patient, She would like to go home, Advised patient to return to ED is contractions become more frequent or she becomes more uncomfortable.  -Return precautions provided.  -Patient is to call OB in the AM for follow up.    Discussed with Dr. Rojas.

## 2022-10-14 ENCOUNTER — APPOINTMENT (OUTPATIENT)
Dept: ANTEPARTUM | Facility: CLINIC | Age: 37
End: 2022-10-14

## 2022-10-15 ENCOUNTER — INPATIENT (INPATIENT)
Facility: HOSPITAL | Age: 37
LOS: 1 days | Discharge: HOME | End: 2022-10-17
Attending: OBSTETRICS & GYNECOLOGY | Admitting: OBSTETRICS & GYNECOLOGY
Payer: COMMERCIAL

## 2022-10-15 ENCOUNTER — APPOINTMENT (OUTPATIENT)
Dept: OBGYN | Facility: HOSPITAL | Age: 37
End: 2022-10-15

## 2022-10-15 VITALS — DIASTOLIC BLOOD PRESSURE: 71 MMHG | TEMPERATURE: 99 F | SYSTOLIC BLOOD PRESSURE: 128 MMHG | HEART RATE: 99 BPM

## 2022-10-15 LAB
BASOPHILS # BLD AUTO: 0.03 K/UL — SIGNIFICANT CHANGE UP (ref 0–0.2)
BASOPHILS NFR BLD AUTO: 0.3 % — SIGNIFICANT CHANGE UP (ref 0–2)
BLD GP AB SCN SERPL QL: SIGNIFICANT CHANGE UP
EOSINOPHIL # BLD AUTO: 0.04 K/UL — SIGNIFICANT CHANGE UP (ref 0–0.5)
EOSINOPHIL NFR BLD AUTO: 0.4 % — SIGNIFICANT CHANGE UP (ref 0–6)
GLUCOSE BLDC GLUCOMTR-MCNC: 104 MG/DL — HIGH (ref 70–99)
GLUCOSE BLDC GLUCOMTR-MCNC: 107 MG/DL — HIGH (ref 70–99)
HCT VFR BLD CALC: 34.1 % — LOW (ref 34.5–45)
HGB BLD-MCNC: 11.6 G/DL — SIGNIFICANT CHANGE UP (ref 11.5–15.5)
IMM GRANULOCYTES NFR BLD AUTO: 0.8 % — SIGNIFICANT CHANGE UP (ref 0–0.9)
LYMPHOCYTES # BLD AUTO: 1.98 K/UL — SIGNIFICANT CHANGE UP (ref 1–3.3)
LYMPHOCYTES # BLD AUTO: 18.8 % — SIGNIFICANT CHANGE UP (ref 13–44)
MCHC RBC-ENTMCNC: 30 PG — SIGNIFICANT CHANGE UP (ref 27–34)
MCHC RBC-ENTMCNC: 34 GM/DL — SIGNIFICANT CHANGE UP (ref 32–36)
MCV RBC AUTO: 88.1 FL — SIGNIFICANT CHANGE UP (ref 80–100)
MONOCYTES # BLD AUTO: 0.69 K/UL — SIGNIFICANT CHANGE UP (ref 0–0.9)
MONOCYTES NFR BLD AUTO: 6.6 % — SIGNIFICANT CHANGE UP (ref 2–14)
NEUTROPHILS # BLD AUTO: 7.7 K/UL — HIGH (ref 1.8–7.4)
NEUTROPHILS NFR BLD AUTO: 73.1 % — SIGNIFICANT CHANGE UP (ref 43–77)
PLATELET # BLD AUTO: 222 K/UL — SIGNIFICANT CHANGE UP (ref 150–400)
RBC # BLD: 3.87 M/UL — SIGNIFICANT CHANGE UP (ref 3.8–5.2)
RBC # FLD: 13.4 % — SIGNIFICANT CHANGE UP (ref 10.3–14.5)
SARS-COV-2 RNA SPEC QL NAA+PROBE: SIGNIFICANT CHANGE UP
WBC # BLD: 10.52 K/UL — HIGH (ref 3.8–10.5)
WBC # FLD AUTO: 10.52 K/UL — HIGH (ref 3.8–10.5)

## 2022-10-15 PROCEDURE — 59400 OBSTETRICAL CARE: CPT | Mod: U7

## 2022-10-15 RX ORDER — AMPICILLIN TRIHYDRATE 250 MG
1 CAPSULE ORAL EVERY 4 HOURS
Refills: 0 | Status: DISCONTINUED | OUTPATIENT
Start: 2022-10-15 | End: 2022-10-16

## 2022-10-15 RX ORDER — OXYCODONE HYDROCHLORIDE 5 MG/1
5 TABLET ORAL
Refills: 0 | Status: DISCONTINUED | OUTPATIENT
Start: 2022-10-15 | End: 2022-10-17

## 2022-10-15 RX ORDER — AER TRAVELER 0.5 G/1
1 SOLUTION RECTAL; TOPICAL EVERY 4 HOURS
Refills: 0 | Status: DISCONTINUED | OUTPATIENT
Start: 2022-10-15 | End: 2022-10-17

## 2022-10-15 RX ORDER — DIBUCAINE 1 %
1 OINTMENT (GRAM) RECTAL EVERY 6 HOURS
Refills: 0 | Status: DISCONTINUED | OUTPATIENT
Start: 2022-10-15 | End: 2022-10-17

## 2022-10-15 RX ORDER — LANOLIN
1 OINTMENT (GRAM) TOPICAL EVERY 6 HOURS
Refills: 0 | Status: DISCONTINUED | OUTPATIENT
Start: 2022-10-15 | End: 2022-10-17

## 2022-10-15 RX ORDER — SODIUM CHLORIDE 9 MG/ML
3 INJECTION INTRAMUSCULAR; INTRAVENOUS; SUBCUTANEOUS EVERY 8 HOURS
Refills: 0 | Status: DISCONTINUED | OUTPATIENT
Start: 2022-10-15 | End: 2022-10-17

## 2022-10-15 RX ORDER — SODIUM CHLORIDE 9 MG/ML
1000 INJECTION, SOLUTION INTRAVENOUS
Refills: 0 | Status: DISCONTINUED | OUTPATIENT
Start: 2022-10-15 | End: 2022-10-17

## 2022-10-15 RX ORDER — OXYTOCIN 10 UNIT/ML
333.33 VIAL (ML) INJECTION
Qty: 20 | Refills: 0 | Status: DISCONTINUED | OUTPATIENT
Start: 2022-10-15 | End: 2022-10-17

## 2022-10-15 RX ORDER — MAGNESIUM HYDROXIDE 400 MG/1
30 TABLET, CHEWABLE ORAL
Refills: 0 | Status: DISCONTINUED | OUTPATIENT
Start: 2022-10-15 | End: 2022-10-17

## 2022-10-15 RX ORDER — PRAMOXINE HYDROCHLORIDE 150 MG/15G
1 AEROSOL, FOAM RECTAL EVERY 4 HOURS
Refills: 0 | Status: DISCONTINUED | OUTPATIENT
Start: 2022-10-15 | End: 2022-10-17

## 2022-10-15 RX ORDER — CHLORHEXIDINE GLUCONATE 213 G/1000ML
1 SOLUTION TOPICAL ONCE
Refills: 0 | Status: DISCONTINUED | OUTPATIENT
Start: 2022-10-15 | End: 2022-10-16

## 2022-10-15 RX ORDER — KETOROLAC TROMETHAMINE 30 MG/ML
30 SYRINGE (ML) INJECTION ONCE
Refills: 0 | Status: DISCONTINUED | OUTPATIENT
Start: 2022-10-15 | End: 2022-10-16

## 2022-10-15 RX ORDER — CITRIC ACID/SODIUM CITRATE 300-500 MG
30 SOLUTION, ORAL ORAL ONCE
Refills: 0 | Status: DISCONTINUED | OUTPATIENT
Start: 2022-10-15 | End: 2022-10-16

## 2022-10-15 RX ORDER — OXYTOCIN 10 UNIT/ML
333.33 VIAL (ML) INJECTION
Qty: 20 | Refills: 0 | Status: COMPLETED | OUTPATIENT
Start: 2022-10-15 | End: 2022-10-15

## 2022-10-15 RX ORDER — AMPICILLIN TRIHYDRATE 250 MG
2 CAPSULE ORAL ONCE
Refills: 0 | Status: COMPLETED | OUTPATIENT
Start: 2022-10-15 | End: 2022-10-15

## 2022-10-15 RX ORDER — INFLUENZA VIRUS VACCINE 15; 15; 15; 15 UG/.5ML; UG/.5ML; UG/.5ML; UG/.5ML
0.5 SUSPENSION INTRAMUSCULAR ONCE
Refills: 0 | Status: COMPLETED | OUTPATIENT
Start: 2022-10-15 | End: 2022-10-15

## 2022-10-15 RX ORDER — OXYTOCIN 10 UNIT/ML
2 VIAL (ML) INJECTION
Qty: 30 | Refills: 0 | Status: DISCONTINUED | OUTPATIENT
Start: 2022-10-15 | End: 2022-10-17

## 2022-10-15 RX ORDER — BENZOCAINE 10 %
1 GEL (GRAM) MUCOUS MEMBRANE EVERY 6 HOURS
Refills: 0 | Status: DISCONTINUED | OUTPATIENT
Start: 2022-10-15 | End: 2022-10-17

## 2022-10-15 RX ORDER — HYDROCORTISONE 1 %
1 OINTMENT (GRAM) TOPICAL EVERY 6 HOURS
Refills: 0 | Status: DISCONTINUED | OUTPATIENT
Start: 2022-10-15 | End: 2022-10-17

## 2022-10-15 RX ORDER — TETANUS TOXOID, REDUCED DIPHTHERIA TOXOID AND ACELLULAR PERTUSSIS VACCINE, ADSORBED 5; 2.5; 8; 8; 2.5 [IU]/.5ML; [IU]/.5ML; UG/.5ML; UG/.5ML; UG/.5ML
0.5 SUSPENSION INTRAMUSCULAR ONCE
Refills: 0 | Status: COMPLETED | OUTPATIENT
Start: 2022-10-15

## 2022-10-15 RX ORDER — IBUPROFEN 200 MG
600 TABLET ORAL EVERY 6 HOURS
Refills: 0 | Status: COMPLETED | OUTPATIENT
Start: 2022-10-15 | End: 2023-09-13

## 2022-10-15 RX ORDER — ACETAMINOPHEN 500 MG
975 TABLET ORAL
Refills: 0 | Status: DISCONTINUED | OUTPATIENT
Start: 2022-10-15 | End: 2022-10-17

## 2022-10-15 RX ORDER — SIMETHICONE 80 MG/1
80 TABLET, CHEWABLE ORAL EVERY 4 HOURS
Refills: 0 | Status: DISCONTINUED | OUTPATIENT
Start: 2022-10-15 | End: 2022-10-17

## 2022-10-15 RX ORDER — DIPHENHYDRAMINE HCL 50 MG
25 CAPSULE ORAL EVERY 6 HOURS
Refills: 0 | Status: DISCONTINUED | OUTPATIENT
Start: 2022-10-15 | End: 2022-10-17

## 2022-10-15 RX ORDER — SODIUM CHLORIDE 9 MG/ML
1000 INJECTION, SOLUTION INTRAVENOUS
Refills: 0 | Status: DISCONTINUED | OUTPATIENT
Start: 2022-10-15 | End: 2022-10-16

## 2022-10-15 RX ORDER — OXYCODONE HYDROCHLORIDE 5 MG/1
5 TABLET ORAL ONCE
Refills: 0 | Status: DISCONTINUED | OUTPATIENT
Start: 2022-10-15 | End: 2022-10-17

## 2022-10-15 RX ORDER — METFORMIN HYDROCHLORIDE 850 MG/1
1500 TABLET ORAL AT BEDTIME
Refills: 0 | Status: DISCONTINUED | OUTPATIENT
Start: 2022-10-15 | End: 2022-10-15

## 2022-10-15 RX ORDER — METFORMIN HYDROCHLORIDE 850 MG/1
1500 TABLET ORAL AT BEDTIME
Refills: 0 | Status: DISCONTINUED | OUTPATIENT
Start: 2022-10-15 | End: 2022-10-17

## 2022-10-15 RX ADMIN — Medication 200 GRAM(S): at 18:15

## 2022-10-15 RX ADMIN — SODIUM CHLORIDE 125 MILLILITER(S): 9 INJECTION, SOLUTION INTRAVENOUS at 22:24

## 2022-10-15 RX ADMIN — METFORMIN HYDROCHLORIDE 1500 MILLIGRAM(S): 850 TABLET ORAL at 22:54

## 2022-10-15 RX ADMIN — SODIUM CHLORIDE 125 MILLILITER(S): 9 INJECTION, SOLUTION INTRAVENOUS at 18:21

## 2022-10-15 RX ADMIN — SODIUM CHLORIDE 125 MILLILITER(S): 9 INJECTION, SOLUTION INTRAVENOUS at 18:19

## 2022-10-15 RX ADMIN — Medication 108 GRAM(S): at 22:10

## 2022-10-15 RX ADMIN — Medication 2 MILLIUNIT(S)/MIN: at 19:48

## 2022-10-15 NOTE — OB PROVIDER H&P - HISTORY OF PRESENT ILLNESS
SURINDER ORTIZ is a 36y  at 38w4d by 1st tri US (22)  JUAN: 10/25/22, presenting to L&D for IOL 2/2 GDMA2. Denies CTX, LOF, VB. Endorses + FM. Pt denies headaches, visual disturbances, RUQ pain, epigastric pain and new-onset edema. She denies fevers, chills, nausea, vomiting. No other complaints at this time.       Pregnancy course is significant for:   GDMA2, on metformin  Echogenic right lung (normal MRI)  High risk pregnancy with high inhibin A, 90th percentile  Marginal insertion of umbilical cord  History of recurrent pregnancy loss  GBS positive        POB:  2017 FT VD; complicated by gestational diabetes  SABx6  TOPx1  PGYN: -fibroids/-cysts, denied STD hx, denies abnormal PAPs  PMH: gestational diabetes  PSH: denies  SH: Denies tobacco use, EtOH use and illicit drug use during the pregnancy; Feels safe at home  Meds: PNV, metformin 1500mg qHS, aspirin  All: NKDA

## 2022-10-15 NOTE — OB PROVIDER DELIVERY SUMMARY - NSSELHIDDEN_OBGYN_ALL_OB_FT
[NS_DeliveryAttending1_OBGYN_ALL_OB_FT:[id number]=],[NS_DeliveryAssist1_OBGYN_ALL_OB_FT:[id number]] [NS_DeliveryAttending1_OBGYN_ALL_OB_FT:[id number]=],[NS_DeliveryAssist1_OBGYN_ALL_OB_FT:[id number]]

## 2022-10-15 NOTE — OB RN DELIVERY SUMMARY - NS_ADDITIONALPERSONNEL_OBGYN_ALL_OB_FT
Nausea/Vomiting/Diarrhea HPI





- General


Source: patient, RN notes reviewed, old records reviewed


Mode of arrival: EMS


Limitations: no limitations





<Gloria Wells - Last Filed: 03/14/18 04:58>





<Rosa Chau - Last Filed: 03/14/18 05:36>





- General


Chief complaint: Nausea/Vomiting/Diarrhea


Stated complaint: flu like symptoms


Time Seen by Provider: 03/14/18 02:37





- History of Present Illness


Initial comments: 





Patient is a pleasant 81-year-old male presents emergency department today she 

played of nausea vomiting and diarrhea since Sunday.  Patient reports that he's 

not been able to hold down his antinausea medicine or blood pressure 

medications.  Patient states that he has had no blood in his stool or emesis.  

Patient reports that he is able to hold anything down for the past few days.  

They did try popsicles, and mashed potatoes but he was unable to keep this down 

as well.  Patient reports he feels very weak and tired.  He is concerned of 

dehydration.  Denies any significant abdominal pain at this time.  Proceed 

assessment some mild lower quadrant tenderness.  Reports he's had normal 

urination. (Gloria Wells)





- Related Data


 Home Medications











 Medication  Instructions  Recorded  Confirmed


 


Unable To Assess [Unable to Assess]  03/14/18 03/14/18











 Allergies











Allergy/AdvReac Type Severity Reaction Status Date / Time


 


moxifloxacin Allergy  Unknown Verified 03/14/18 04:49














Review of Systems


ROS Other: All systems not noted in ROS Statement are negative.





<Gloria Wells - Last Filed: 03/14/18 04:58>


ROS Other: All systems not noted in ROS Statement are negative.





<Rosa Chau - Last Filed: 03/14/18 05:36>


ROS Statement: 


Those systems with pertinent positive or pertinent negative responses have been 

documented in the HPI.








Past Medical History


Past Medical History: Hypertension


History of Any Multi-Drug Resistant Organisms: None Reported


Past Surgical History: Appendectomy


Past Psychological History: No Psychological Hx Reported


Smoking Status: Never smoker


Past Alcohol Use History: Daily


Past Drug Use History: None Reported





<Gloria Wells - Last Filed: 03/14/18 04:58>





General Exam


Limitations: no limitations


General appearance: alert, in no apparent distress


Head exam: Present: atraumatic, normocephalic, normal inspection


Eye exam: Present: normal appearance, PERRL, EOMI.  Absent: scleral icterus, 

conjunctival injection, periorbital swelling


ENT exam: Present: normal exam, mucous membranes moist


Neck exam: Present: normal inspection.  Absent: tenderness, meningismus, 

lymphadenopathy


Respiratory exam: Present: normal lung sounds bilaterally.  Absent: respiratory 

distress, wheezes, rales, rhonchi, stridor


Cardiovascular Exam: Present: regular rate, normal rhythm, normal heart sounds.

  Absent: systolic murmur, diastolic murmur, rubs, gallop, clicks


GI/Abdominal exam: Present: soft, tenderness (epigastric tenderness.), 

hyperactive bowel sounds, other (Hyperactive bowel sounds).  Absent: distended, 

guarding, rebound, rigid, normal bowel sounds


Extremities exam: Present: normal inspection, full ROM, normal capillary 

refill.  Absent: tenderness, pedal edema, joint swelling, calf tenderness


Back exam: Present: normal inspection


Neurological exam: Present: alert, oriented X3, CN II-XII intact


Skin exam: Present: warm, dry, intact, normal color.  Absent: rash





<Gloria Wells - Last Filed: 03/14/18 04:58>





<Rosa Chau - Last Filed: 03/14/18 05:36>





- General Exam Comments


Initial Comments: 





This patient is an 7-year-old male.  Patient is hard of hearing.    He did have 

2 episodes of vomiting while in the emergency department. (Gloria Wells)





Course





<Gloira Wells - Last Filed: 03/14/18 04:58>





<Rosa Chau - Last Filed: 03/14/18 05:36>





 Vital Signs











  03/14/18 03/14/18





  02:38 05:05


 


Temperature 97.8 F 


 


Pulse Rate 101 H 103 H


 


Respiratory 20 20





Rate  


 


Blood Pressure 219/91 225/101


 


O2 Sat by Pulse 93 L 95





Oximetry  











Patient is reassessed at 527, CBC is unremarkable compress metabolic panel 

troponin is unremarkable as well chest x-ray showed a nodule and KUB ruled out 

any bowel obstruction noticed his heart pressure is quite elevated is to 15 

systolic over try some labetalol 20 mg IV, he takes atenolol 100 mg daily along 

with the amlodipine 5 mg he said he was not able to keep it down today 

considering his intractable nausea and vomiting and high blood pressure he 

would need to come admitted to Dr. Harry Albarran service (Rosa Chau)





- Reevaluation(s)


Reevaluation #1: 





03/14/18 04:54


Patient had another episode of vomiting despite the nausea medications.  

Patient seemed to be choking on his own vomit.  We did suction, and patient was 

then resting comfortably. (Gloria Wells)





Medical Decision Making





- Lab Data


Result diagrams: 


 03/14/18 02:40





 03/14/18 02:40





- Radiology Data


Radiology results: report reviewed





<Gloria Wells - Last Filed: 03/14/18 04:58>





- Lab Data


Result diagrams: 


 03/14/18 02:40





 03/14/18 02:40





<Rosa Chau - Last Filed: 03/14/18 05:36>





- Medical Decision Making





This patient is a 1-year-old male presents emergency Department with multiple 

episodes of diarrhea, nausea vomiting for the past 3 days.  Patient hasn't had 

a history of colon cancer and colon removal 9 years ago.  He also had a recent 

dislocated right shoulder.  It was replaced and patient is currently in a 

sling.  Patient saw his PCP earlier today, and was given a prescription for 

Zofran.  He is unable to tolerate any pills including his blood pressure 

medications.  He arrived with hypertensive emergency.  He was given IV 

labetalol.  He reports that he has no significant abdominal tenderness.  Mild 

bloating that he's noted.  KUB was reviewed and shows normal bowel gas pattern.

  No obstructions.  Patient did have multiple episodes of vomiting while in the 

emergency department.  He was given a liter of fluids and labs obtained.  

Patient's has elevated BUN consistent with prerenal dehydration.  Patient 

family informed of this and advised that they need to be admitted this time for 

simply dehydration.  Chest x-ray also noted a stable 2.6 nodule.  Recommended 

following up with CAT scan to rule out metastatic or cancerous etiology.  

Discussed with Dr. Raman who will discuss this with the patient's PCP.  He'll 

be admitted. (Gloria Wells)





- Lab Data





 Lab Results











  03/14/18 03/14/18 03/14/18 Range/Units





  02:40 02:40 02:40 


 


WBC  9.6    (3.8-10.6)  k/uL


 


RBC  5.29    (4.30-5.90)  m/uL


 


Hgb  15.6    (13.0-17.5)  gm/dL


 


Hct  44.6    (39.0-53.0)  %


 


MCV  84.3    (80.0-100.0)  fL


 


MCH  29.5    (25.0-35.0)  pg


 


MCHC  35.0    (31.0-37.0)  g/dL


 


RDW  13.9    (11.5-15.5)  %


 


Plt Count  163    (150-450)  k/uL


 


Neutrophils %  85    %


 


Lymphocytes %  10    %


 


Monocytes %  4    %


 


Eosinophils %  0    %


 


Basophils %  0    %


 


Neutrophils #  8.1 H    (1.3-7.7)  k/uL


 


Lymphocytes #  0.9 L    (1.0-4.8)  k/uL


 


Monocytes #  0.4    (0-1.0)  k/uL


 


Eosinophils #  0.0    (0-0.7)  k/uL


 


Basophils #  0.0    (0-0.2)  k/uL


 


Sodium   142   (137-145)  mmol/L


 


Potassium   4.1   (3.5-5.1)  mmol/L


 


Chloride   106   ()  mmol/L


 


Carbon Dioxide   22   (22-30)  mmol/L


 


Anion Gap   14   mmol/L


 


BUN   42 H   (9-20)  mg/dL


 


Creatinine   0.90   (0.66-1.25)  mg/dL


 


Est GFR (CKD-EPI)AfAm   >90   (>60 ml/min/1.73 sqM)  


 


Est GFR (CKD-EPI)NonAf   79   (>60 ml/min/1.73 sqM)  


 


Glucose   173 H   (74-99)  mg/dL


 


Plasma Lactic Acid Luke    1.8  (0.7-2.0)  mmol/L


 


Calcium   10.4 H   (8.4-10.2)  mg/dL


 


Total Bilirubin   0.7   (0.2-1.3)  mg/dL


 


AST   26   (17-59)  U/L


 


ALT   18 L   (21-72)  U/L


 


Alkaline Phosphatase   65   ()  U/L


 


Troponin I     (0.000-0.034)  ng/mL


 


Total Protein   7.1   (6.3-8.2)  g/dL


 


Albumin   4.2   (3.5-5.0)  g/dL


 


Amylase   <30 L   ()  U/L


 


Lipase   71   ()  U/L














  03/14/18 Range/Units





  02:40 


 


WBC   (3.8-10.6)  k/uL


 


RBC   (4.30-5.90)  m/uL


 


Hgb   (13.0-17.5)  gm/dL


 


Hct   (39.0-53.0)  %


 


MCV   (80.0-100.0)  fL


 


MCH   (25.0-35.0)  pg


 


MCHC   (31.0-37.0)  g/dL


 


RDW   (11.5-15.5)  %


 


Plt Count   (150-450)  k/uL


 


Neutrophils %   %


 


Lymphocytes %   %


 


Monocytes %   %


 


Eosinophils %   %


 


Basophils %   %


 


Neutrophils #   (1.3-7.7)  k/uL


 


Lymphocytes #   (1.0-4.8)  k/uL


 


Monocytes #   (0-1.0)  k/uL


 


Eosinophils #   (0-0.7)  k/uL


 


Basophils #   (0-0.2)  k/uL


 


Sodium   (137-145)  mmol/L


 


Potassium   (3.5-5.1)  mmol/L


 


Chloride   ()  mmol/L


 


Carbon Dioxide   (22-30)  mmol/L


 


Anion Gap   mmol/L


 


BUN   (9-20)  mg/dL


 


Creatinine   (0.66-1.25)  mg/dL


 


Est GFR (CKD-EPI)AfAm   (>60 ml/min/1.73 sqM)  


 


Est GFR (CKD-EPI)NonAf   (>60 ml/min/1.73 sqM)  


 


Glucose   (74-99)  mg/dL


 


Plasma Lactic Acid Luke   (0.7-2.0)  mmol/L


 


Calcium   (8.4-10.2)  mg/dL


 


Total Bilirubin   (0.2-1.3)  mg/dL


 


AST   (17-59)  U/L


 


ALT   (21-72)  U/L


 


Alkaline Phosphatase   ()  U/L


 


Troponin I  <0.012  (0.000-0.034)  ng/mL


 


Total Protein   (6.3-8.2)  g/dL


 


Albumin   (3.5-5.0)  g/dL


 


Amylase   ()  U/L


 


Lipase   ()  U/L














- Radiology Data


No acute findings of no chest x-ray.  2.6 from the density projects of the 

lower lung as seen on the lateral view.  Underlying mass is not excluded.  

Recommend follow-up CT chest.  Nonspecific nonobstructive bowel gas pattern.  

Possible left-sided nephrolith versus tear content (Gloria Wells)





Critical Care Time





<Gloria Wells - Last Filed: 03/14/18 04:58>


Total Critical Care Time: 45





<oRsa Chau - Last Filed: 03/14/18 05:36>


Critical Care Time: 


Blood pressure in spite of for hydralazine is still quite high systolic 220, 

there are dry labetalol 20 mg along with the antinausea medication and another 

4 mg of Zofran see if we could give him is atenolol along with the amlodipine 

because according to the patient he threw up he was not able to keep his 

medications down as well noticed a lung nodule on his chest x-ray that will 

need a follow-up as well.  I am EKG is EKG shows right bundle branch block 

ventricular rate is 1 or 3 [place] interval is 198 QRS duration is 162 QT/QTc is 460/

544-day-old quite a few artifacts and I have no old EKG to compare with noticed 

some PVCs right bundle-branch block and don't see any ST elevation or ST 

depression in this EKG


 (Rosa Chau)





Disposition


Time of Disposition: 04:55





<Gloria Wells - Last Filed: 03/14/18 04:58>





<Rosa Chau - Last Filed: 03/14/18 05:36>


Clinical Impression: 


 Nausea & vomiting, Dehydration, Diarrhea, Hypertension





Disposition: ADMITTED AS IP TO THIS HOSP


Condition: Stable


Referrals: 


Harry Palmer MD [Primary Care Provider] - 1-2 days
MD Camarillo

## 2022-10-15 NOTE — OB RN PATIENT PROFILE - ALERT: PERTINENT HISTORY
· Presents with cough, SOB x1 week  Saw PCP as outpatient- placed on Prednisone   · Troponin, BNP WNL  · CXR- RUQ opacity  Final read pending  · D-Dimer 842  · CTA Chest- No acute pulmonary embolic disease  Scarring with traction bronchiectasis in the right middle lobe  Branching opacity with cavitary lesion in the perihilar right upper lobe  Multiple centrilobular pulmonary nodules and branching tree in bud opacities are noted in bilateral lower lobes  Infectious etiology is favored  · Afebrile without lactic acidosis  · Leukocytosis of 12 57   · ? Secondary to prednisone  · O2 Sat 94% on RA  · Respiratory protocol  · IV Cefepime, Vanco   · Urine strep/legionella  · Sputum culture  · Blood cultures pending 
1st Trimester Sonogram/20 Week Level II Sonogram

## 2022-10-15 NOTE — OB RN DELIVERY SUMMARY - NS_SEPSISRSKCALC_OBGYN_ALL_OB_FT
EOS calculated successfully. EOS Risk Factor: 0.5/1000 live births ([facility] [place]note 2[place] national incidence); GA=38w4d; Temp=98.8; ROM=0.483; GBS='Positive'; Antibiotics='Broad spectrum antibiotics > 4 hrs prior to birth'

## 2022-10-15 NOTE — OB PROVIDER H&P - NSHPPHYSICALEXAM_GEN_ALL_CORE
Vital Signs Last 24 Hrs  T(C): 37.1 (15 Oct 2022 17:09), Max: 37.1 (15 Oct 2022 17:08)  T(F): 98.8 (15 Oct 2022 17:09), Max: 98.8 (15 Oct 2022 17:09)  HR: 81 (15 Oct 2022 21:48) (79 - 99)  BP: 120/64 (15 Oct 2022 21:48) (112/61 - 140/63)  BP(mean): --  RR: 18 (15 Oct 2022 17:09) (18 - 18)  SpO2: 99% (15 Oct 2022 21:45) (98% - 100%)    Parameters below as of 15 Oct 2022 17:09  Patient On (Oxygen Delivery Method): room air      General: AAOx3, well appearing  Head: Normocephalic atraumatic  EENT: EOMI, no scleral icterus  Abdomen: soft, gravid, nontender, nonperitonitic  MSK/Vascular: full ROM, soft compartments, warm extremities  Neuro: AAOx3, sensation to light touch intact  Psych: calm, cooperative Vital Signs Last 24 Hrs  T(C): 37.1 (15 Oct 2022 17:09), Max: 37.1 (15 Oct 2022 17:08)  T(F): 98.8 (15 Oct 2022 17:09), Max: 98.8 (15 Oct 2022 17:09)  HR: 81 (15 Oct 2022 21:48) (79 - 99)  BP: 120/64 (15 Oct 2022 21:48) (112/61 - 140/63)  BP(mean): --  RR: 18 (15 Oct 2022 17:09) (18 - 18)  SpO2: 99% (15 Oct 2022 21:45) (98% - 100%)    Parameters below as of 15 Oct 2022 17:09  Patient On (Oxygen Delivery Method): room air      General: AAOx3, well appearing  Head: Normocephalic atraumatic  EENT: EOMI, no scleral icterus  Abdomen: soft, gravid, nontender, nonperitonitic  MSK/Vascular: full ROM, soft compartments, warm extremities  Neuro: AAOx3, sensation to light touch intact  Psych: calm, cooperative  SVE: 4/60/-3  Bedside sono: cephalic, anterior placenta  FHRT: 130bpm baseline, moderate variability, + acels, no decels  [place] q2-3m

## 2022-10-15 NOTE — OB RN PATIENT PROFILE - HAVE YOU HAD A SECOND COVID-19 BOOSTER?
Pt has hx of high PCO2 levels and low pH; pt on 30% BIPAP pt admitted respiratory failure Pt has hx of high PCO2 levels and low pH; pt on 30% BIPAP Pt has hx of COPD and hypercapnic respiratory failure; pt on 30% BIPAP at night and 3L NC during the day Pt has hx of COPD and hypercapnic respiratory failure; pt on 30% BIPAP at night and 3L NC during the day No

## 2022-10-15 NOTE — OB PROVIDER H&P - ASSESSMENT
SURINDER ORTIZ is a 36y  at 38w4d by 1st tri US (22)  JUAN: 10/25/22, presenting to L&D for IOL 2/2 GDMA2.    -Admit to L&D  -Consent  -Admission labs  -IV fluids  -Fetus: Cat I tracing. Continuous toco and fetal monitoring.   -GBS: postive, ampicillin ppx ordered  -Analgesia: epidural  -Induction with pitocin      Discussed with Dr. Rojas SURINDER ORTIZ is a 36y  at 38w4d by 1st tri US (22)  JUAN: 10/25/22, presenting to L&D for IOL 2/2 GDMA2.    -Admit to L&D  -Consent  -Admission labs  -IV fluids  -Fetus: Cat I tracing. Continuous toco and fetal monitoring.   -GBS: postive, ampicillin ppx ordered  -Analgesia: epidural  -Induction with pitocin      Discussed with Dr. Rojas & Dr Camarillo

## 2022-10-15 NOTE — OB PROVIDER DELIVERY SUMMARY - NSPROVIDERDELIVERYNOTE_OBGYN_ALL_OB_FT
at 2314 of a live female, weight pending skin to skin and Apgars 9/9. Delivered DAYANA, no nuchal cord, clear fluid. Infant's head delivered with maternal expulsive efforts. Shoulders delivered without difficulty followed by the rest of the body. Nose and mouth were bulb suctioned. Cord clamped and cut after delay. Samples obtained. Baby handed to patient. Placenta delivered spontaneously, intact, 3VC. Fundus firm, minimal bleeding. Perineum and vagina inspected – small 1st degree perineal laceration repaired with 0 vicryl. EBL 58cc. Hemostasis noted. Pt tolerated procedure well, in stable condition, recovering in LDR. Infant in LDR. Instrument/sponge count correct x 2 and confirmed by nurse.

## 2022-10-16 ENCOUNTER — TRANSCRIPTION ENCOUNTER (OUTPATIENT)
Age: 37
End: 2022-10-16

## 2022-10-16 ENCOUNTER — NON-APPOINTMENT (OUTPATIENT)
Age: 37
End: 2022-10-16

## 2022-10-16 LAB
COVID-19 SPIKE DOMAIN AB INTERP: POSITIVE
COVID-19 SPIKE DOMAIN ANTIBODY RESULT: >250 U/ML — HIGH
GLUCOSE BLDC GLUCOMTR-MCNC: 94 MG/DL — SIGNIFICANT CHANGE UP (ref 70–99)
HCT VFR BLD CALC: 32.3 % — LOW (ref 34.5–45)
HGB BLD-MCNC: 10.6 G/DL — LOW (ref 11.5–15.5)
SARS-COV-2 IGG+IGM SERPL QL IA: >250 U/ML — HIGH
SARS-COV-2 IGG+IGM SERPL QL IA: POSITIVE
T PALLIDUM AB TITR SER: NEGATIVE — SIGNIFICANT CHANGE UP

## 2022-10-16 RX ORDER — IBUPROFEN 200 MG
600 TABLET ORAL EVERY 6 HOURS
Refills: 0 | Status: DISCONTINUED | OUTPATIENT
Start: 2022-10-16 | End: 2022-10-17

## 2022-10-16 RX ORDER — IBUPROFEN 200 MG
1 TABLET ORAL
Qty: 28 | Refills: 0
Start: 2022-10-16 | End: 2022-10-22

## 2022-10-16 RX ORDER — ASPIRIN/CALCIUM CARB/MAGNESIUM 324 MG
0 TABLET ORAL
Qty: 0 | Refills: 0 | DISCHARGE

## 2022-10-16 RX ORDER — ACETAMINOPHEN 500 MG
2 TABLET ORAL
Qty: 56 | Refills: 0
Start: 2022-10-16 | End: 2022-10-22

## 2022-10-16 RX ADMIN — Medication 600 MILLIGRAM(S): at 18:10

## 2022-10-16 RX ADMIN — Medication 975 MILLIGRAM(S): at 09:02

## 2022-10-16 RX ADMIN — Medication 30 MILLIGRAM(S): at 00:30

## 2022-10-16 RX ADMIN — Medication 975 MILLIGRAM(S): at 21:49

## 2022-10-16 RX ADMIN — Medication 1 TABLET(S): at 11:50

## 2022-10-16 RX ADMIN — Medication 600 MILLIGRAM(S): at 06:44

## 2022-10-16 RX ADMIN — Medication 600 MILLIGRAM(S): at 11:50

## 2022-10-16 RX ADMIN — Medication 975 MILLIGRAM(S): at 16:03

## 2022-10-16 RX ADMIN — Medication 600 MILLIGRAM(S): at 06:09

## 2022-10-16 RX ADMIN — Medication 30 MILLIGRAM(S): at 01:11

## 2022-10-16 RX ADMIN — Medication 1000 MILLIUNIT(S)/MIN: at 00:01

## 2022-10-16 RX ADMIN — METFORMIN HYDROCHLORIDE 1500 MILLIGRAM(S): 850 TABLET ORAL at 22:15

## 2022-10-16 NOTE — DISCHARGE NOTE OB - CARE PROVIDER_API CALL
Gelacio Rojas)  Obstetrics and Gynecology  [address], [address]  [place] [address]  Phone: [phone]  Fax: [phone]  Follow Up Time: 2 weeks

## 2022-10-16 NOTE — DISCHARGE NOTE OB - PATIENT PORTAL LINK FT
You can access the FollowMyHealth Patient Portal offered by [facility] by registering at the following website: [email or web address]. By joining [facility]’s FollowMyHealth portal, you will also be able to view your health information using other applications (apps) compatible with our system.

## 2022-10-16 NOTE — DISCHARGE NOTE OB - NS MD DC FALL RISK RISK
For information on Fall & Injury Prevention, visit: [email or web address] OR  [email or web address] OR  https://www.cdc.gov/steadi/patient.html

## 2022-10-16 NOTE — DISCHARGE NOTE OB - MEDICATION SUMMARY - MEDICATIONS TO STOP TAKING
I will STOP taking the medications listed below when I get home from the hospital:    metroNIDAZOLE 500 mg oral tablet  -- 1 tab(s) by mouth every 12 hours   -- Do not drink alcoholic beverages when taking this medication.  Finish all this medication unless otherwise directed by prescriber.  May discolor urine or feces.

## 2022-10-16 NOTE — DISCHARGE NOTE OB - HOSPITAL COURSE
s/p uncomplicated  on 10/15. Patient transferred to post partum unit, uncomplicated hospital course. At the time of discharge patient was tolerating regular diet PO, ambulating, voiding, and demonstrating bowel function. Pain well controlled with pain medications PRN.

## 2022-10-17 VITALS
RESPIRATION RATE: 16 BRPM | TEMPERATURE: 99 F | SYSTOLIC BLOOD PRESSURE: 127 MMHG | HEART RATE: 87 BPM | DIASTOLIC BLOOD PRESSURE: 79 MMHG | OXYGEN SATURATION: 99 %

## 2022-10-17 LAB
MEV IGG SER-ACNC: 206 AU/ML — SIGNIFICANT CHANGE UP
MEV IGG+IGM SER-IMP: POSITIVE — SIGNIFICANT CHANGE UP

## 2022-10-17 PROCEDURE — 85014 HEMATOCRIT: CPT

## 2022-10-17 PROCEDURE — 86901 BLOOD TYPING SEROLOGIC RH(D): CPT

## 2022-10-17 PROCEDURE — 86850 RBC ANTIBODY SCREEN: CPT

## 2022-10-17 PROCEDURE — 86769 SARS-COV-2 COVID-19 ANTIBODY: CPT

## 2022-10-17 PROCEDURE — 85025 COMPLETE CBC W/AUTO DIFF WBC: CPT

## 2022-10-17 PROCEDURE — 36415 COLL VENOUS BLD VENIPUNCTURE: CPT

## 2022-10-17 PROCEDURE — 82962 GLUCOSE BLOOD TEST: CPT

## 2022-10-17 PROCEDURE — 86900 BLOOD TYPING SEROLOGIC ABO: CPT

## 2022-10-17 PROCEDURE — U0005: CPT

## 2022-10-17 PROCEDURE — 90715 TDAP VACCINE 7 YRS/> IM: CPT

## 2022-10-17 PROCEDURE — 86765 RUBEOLA ANTIBODY: CPT

## 2022-10-17 PROCEDURE — 85018 HEMOGLOBIN: CPT

## 2022-10-17 PROCEDURE — 86780 TREPONEMA PALLIDUM: CPT

## 2022-10-17 PROCEDURE — U0003: CPT

## 2022-10-17 RX ORDER — TETANUS TOXOID, REDUCED DIPHTHERIA TOXOID AND ACELLULAR PERTUSSIS VACCINE, ADSORBED 5; 2.5; 8; 8; 2.5 [IU]/.5ML; [IU]/.5ML; UG/.5ML; UG/.5ML; UG/.5ML
0.5 SUSPENSION INTRAMUSCULAR ONCE
Refills: 0 | Status: COMPLETED | OUTPATIENT
Start: 2022-10-17 | End: 2022-10-17

## 2022-10-17 RX ADMIN — Medication 600 MILLIGRAM(S): at 08:45

## 2022-10-17 RX ADMIN — TETANUS TOXOID, REDUCED DIPHTHERIA TOXOID AND ACELLULAR PERTUSSIS VACCINE, ADSORBED 0.5 MILLILITER(S): 5; 2.5; 8; 8; 2.5 SUSPENSION INTRAMUSCULAR at 10:52

## 2022-10-17 RX ADMIN — Medication 600 MILLIGRAM(S): at 08:15

## 2022-10-17 NOTE — PROGRESS NOTE ADULT - SUBJECTIVE AND OBJECTIVE BOX
SURINDER ORTIZ is a 36y  s/p uncomplicated  at 38w4d 2/2 GDMA2.  PPD2    SUBJECTIVE:  No acute events overnight.  Patient has no complaints.  Pain is well controlled.  +flatus, + voiding.  Ambulating and tolerating PO.  Appropriate lochia.  Denies fever, chills, nausea, and vomiting.  She denies lightheadedness, dizziness, HA, blurry vision, palpitations, chest pain and SOB.     OBJECTIVE:  Physical exam:  General: AOx3, NAD.  Heart: RRR  Lungs: CTAB  Abdomen: Soft, appropriately tender to palpitation, fundus firm.  Vaginal: expectant lochia  Ext: No DVT signs, warm extremities.    Vital Signs Last 24 Hrs  T(C): 36.8 (17 Oct 2022 04:02), Max: 37.1 (16 Oct 2022 20:00)  T(F): 98.2 (17 Oct 2022 04:02), Max: 98.7 (16 Oct 2022 20:00)  HR: 84 (17 Oct 2022 04:02) (84 - 89)  BP: 110/64 (17 Oct 2022 04:02) (110/64 - 129/72)  RR: 16 (17 Oct 2022 04:02) (16 - 18)  SpO2: 98% (17 Oct 2022 04:02) (98% - 99%)    LABS:                        10.6   x     )-----------( x        ( 16 Oct 2022 08:50 )             32.3             
SURINDER ORTIZ is a 36y  now PPD#1 s/p spontaneous vaginal delivery at 38w4d 2/2 GDMA2 gestation, uncomplicated.    S:    No acute events overnight.   The patient has no complaints.  Pain controlled with current treatment regimen.   She is ambulating without difficulty and tolerating PO.   + flatus/-BM/+ voiding   She endorses appropriate lochia, which is decreasing.   She is breastfeeding without difficulty.   She denies fevers, chills, nausea and vomiting.   She denies lightheadedness, dizziness, palpitations, chest pain and SOB.     O:    T(C): 36.5 (10-16-22 @ 05:45), Max: 37.2 (10-15-22 @ 23:30)  HR: 89 (10-16-22 @ 05:45) (78 - 111)  BP: 129/72 (10-16-22 @ 05:45) (109/67 - 154/66)  RR: 16 (10-16-22 @ 05:45) (16 - 18)  SpO2: 99% (10-16-22 @ 05:45) (96% - 100%)    Gen: NAD, AOx3  Breast: Nontender, non-engorged   Abdomen:  Soft, non-tender, non-distended  Uterus:  Fundus firm below umbilicus  VE:  Expectant lochia  Ext:  Non-tender and non-edematous                          11.6   10.52 )-----------( 222      ( 15 Oct 2022 17:15 )             34.1

## 2022-10-17 NOTE — PROGRESS NOTE ADULT - ASSESSMENT
SURINDER ORTIZ is a 36y  now PPD#1 s/p spontaneous vaginal delivery at 38w4d 2/2 GDMA2 gestation, uncomplicated.        A/P:    -Vital signs stable; normotensive post partum  -Hgb: 11.6 > AM labs pending   -GDMA2: FS for AM pending  -Voiding, tolerating PO, bowel function nml   -Advance care as tolerated   -Continue routine postpartum care and education  -Healthy female infant  -Dispo: Patient to be discharged when meeting all postpartum and postoperative milestones and pending attending approval.    
A/P:SURINDER ORTIZ is a 36y  s/p uncomplicated  at 38w4d 2/2 GDMA2.  PPD2    #GDMA  - PPD1 FS 92    #Routine post partum care  - Stable, doing well postpartum  - Hgb: 11.6 > 10.6  - Pain: well controlled on PO pain meds  - GI: regular diet, normal bowel function   - : voiding , lochia decreasing   - DVT ppx: SCDs, ambulation encouraged  - Healthy female infant  - Dispo: doing well, plan for home today

## 2022-10-17 NOTE — PROGRESS NOTE ADULT - ATTENDING COMMENTS
Patient seen and examined by me.  Agree with resident note.  Pain well controlled.  Tolerating regular diet, no nausea or vomiting.  Bottle feeding.  Minimal lochia.  Ambulating.  She is voiding without difficulty. Denies HA, dizziness, CP, SOB, LE pain.  VSS.  Fundus firm.  Plan for DC home today.  DC instructions and call parameters reviewed.  RTO in 6 weeks for pp visit.
36y  now stable PPD#1 s/p spontaneous vaginal delivery

## 2022-10-18 ENCOUNTER — APPOINTMENT (OUTPATIENT)
Dept: ANTEPARTUM | Facility: CLINIC | Age: 37
End: 2022-10-18

## 2022-10-19 ENCOUNTER — NON-APPOINTMENT (OUTPATIENT)
Age: 37
End: 2022-10-19

## 2022-10-21 ENCOUNTER — APPOINTMENT (OUTPATIENT)
Dept: ANTEPARTUM | Facility: CLINIC | Age: 37
End: 2022-10-21

## 2022-10-31 DIAGNOSIS — O24.415 GESTATIONAL DIABETES MELLITUS IN PREGNANCY, CONTROLLED BY ORAL HYPOGLYCEMIC DRUGS: ICD-10-CM

## 2022-10-31 DIAGNOSIS — O26.819 PREGNANCY RELATED EXHAUSTION AND FATIGUE, UNSPECIFIED TRIMESTER: ICD-10-CM

## 2022-10-31 DIAGNOSIS — O28.8 OTHER ABNORMAL FINDINGS ON ANTENATAL SCREENING OF MOTHER: ICD-10-CM

## 2022-10-31 DIAGNOSIS — O09.899 ABNORMAL HEMATOLOGICAL FINDING ON ANTENATAL SCREENING OF MOTHER: ICD-10-CM

## 2022-10-31 DIAGNOSIS — M79.606 OTHER SPECIFIED PREGNANCY RELATED CONDITIONS, UNSPECIFIED TRIMESTER: ICD-10-CM

## 2022-10-31 DIAGNOSIS — O09.899 OTHER ABNORMAL FINDINGS ON ANTENATAL SCREENING OF MOTHER: ICD-10-CM

## 2022-10-31 DIAGNOSIS — O09.529 SUPERVISION OF ELDERLY MULTIGRAVIDA, UNSPECIFIED TRIMESTER: ICD-10-CM

## 2022-10-31 DIAGNOSIS — O28.0 ABNORMAL HEMATOLOGICAL FINDING ON ANTENATAL SCREENING OF MOTHER: ICD-10-CM

## 2022-10-31 DIAGNOSIS — O99.891 OTHER SPECIFIED DISEASES AND CONDITIONS COMPLICATING PREGNANCY: ICD-10-CM

## 2022-10-31 DIAGNOSIS — Z3A.38 38 WEEKS GESTATION OF PREGNANCY: ICD-10-CM

## 2022-10-31 DIAGNOSIS — O99.340 OTHER MENTAL DISORDERS COMPLICATING PREGNANCY, UNSPECIFIED TRIMESTER: ICD-10-CM

## 2022-10-31 DIAGNOSIS — O24.419 GESTATIONAL DIABETES MELLITUS IN PREGNANCY, UNSPECIFIED CONTROL: ICD-10-CM

## 2022-10-31 DIAGNOSIS — F41.9 OTHER MENTAL DISORDERS COMPLICATING PREGNANCY, UNSPECIFIED TRIMESTER: ICD-10-CM

## 2022-10-31 DIAGNOSIS — O26.899 OTHER SPECIFIED PREGNANCY RELATED CONDITIONS, UNSPECIFIED TRIMESTER: ICD-10-CM

## 2022-10-31 DIAGNOSIS — M54.9 OTHER SPECIFIED DISEASES AND CONDITIONS COMPLICATING PREGNANCY: ICD-10-CM

## 2022-11-02 ENCOUNTER — TRANSCRIPTION ENCOUNTER (OUTPATIENT)
Age: 37
End: 2022-11-02

## 2022-11-02 ENCOUNTER — APPOINTMENT (OUTPATIENT)
Dept: OBGYN | Facility: CLINIC | Age: 37
End: 2022-11-02

## 2022-11-02 ENCOUNTER — INPATIENT (INPATIENT)
Facility: HOSPITAL | Age: 37
LOS: 1 days | Discharge: HOME | DRG: 776 | End: 2022-11-04
Attending: OBSTETRICS & GYNECOLOGY | Admitting: OBSTETRICS & GYNECOLOGY
Payer: COMMERCIAL

## 2022-11-02 VITALS — SYSTOLIC BLOOD PRESSURE: 170 MMHG | DIASTOLIC BLOOD PRESSURE: 102 MMHG

## 2022-11-02 VITALS
BODY MASS INDEX: 25.81 KG/M2 | HEIGHT: 64 IN | WEIGHT: 151.19 LBS | SYSTOLIC BLOOD PRESSURE: 180 MMHG | DIASTOLIC BLOOD PRESSURE: 102 MMHG

## 2022-11-02 VITALS — HEART RATE: 82 BPM | DIASTOLIC BLOOD PRESSURE: 104 MMHG | SYSTOLIC BLOOD PRESSURE: 189 MMHG

## 2022-11-02 DIAGNOSIS — O14.90 UNSPECIFIED PRE-ECLAMPSIA, UNSPECIFIED TRIMESTER: ICD-10-CM

## 2022-11-02 DIAGNOSIS — Z00.00 ENCOUNTER FOR GENERAL ADULT MEDICAL EXAMINATION WITHOUT ABNORMAL FINDINGS: ICD-10-CM

## 2022-11-02 DIAGNOSIS — R30.0 DYSURIA: ICD-10-CM

## 2022-11-02 LAB
ALBUMIN SERPL ELPH-MCNC: 3.8 G/DL — SIGNIFICANT CHANGE UP (ref 3.3–5.2)
ALP SERPL-CCNC: 82 U/L — SIGNIFICANT CHANGE UP (ref 40–120)
ALT FLD-CCNC: 11 U/L — SIGNIFICANT CHANGE UP
ANION GAP SERPL CALC-SCNC: 9 MMOL/L — SIGNIFICANT CHANGE UP (ref 5–17)
APPEARANCE UR: ABNORMAL
APTT BLD: 32.4 SEC — SIGNIFICANT CHANGE UP (ref 27.5–35.5)
AST SERPL-CCNC: 20 U/L — SIGNIFICANT CHANGE UP
BACTERIA # UR AUTO: ABNORMAL
BASOPHILS # BLD AUTO: 0.04 K/UL — SIGNIFICANT CHANGE UP (ref 0–0.2)
BASOPHILS NFR BLD AUTO: 0.5 % — SIGNIFICANT CHANGE UP (ref 0–2)
BILIRUB SERPL-MCNC: 0.5 MG/DL — SIGNIFICANT CHANGE UP (ref 0.4–2)
BILIRUB UR-MCNC: NEGATIVE — SIGNIFICANT CHANGE UP
BUN SERPL-MCNC: 14.1 MG/DL — SIGNIFICANT CHANGE UP (ref 8–20)
CALCIUM SERPL-MCNC: 9.3 MG/DL — SIGNIFICANT CHANGE UP (ref 8.4–10.5)
CHLORIDE SERPL-SCNC: 103 MMOL/L — SIGNIFICANT CHANGE UP (ref 96–108)
CO2 SERPL-SCNC: 29 MMOL/L — SIGNIFICANT CHANGE UP (ref 22–29)
COLOR SPEC: YELLOW — SIGNIFICANT CHANGE UP
COVID-19 SPIKE DOMAIN AB INTERP: POSITIVE
COVID-19 SPIKE DOMAIN ANTIBODY RESULT: >250 U/ML — HIGH
CREAT SERPL-MCNC: 0.69 MG/DL — SIGNIFICANT CHANGE UP (ref 0.5–1.3)
DIFF PNL FLD: ABNORMAL
EGFR: 115 ML/MIN/1.73M2 — SIGNIFICANT CHANGE UP
EOSINOPHIL # BLD AUTO: 0.09 K/UL — SIGNIFICANT CHANGE UP (ref 0–0.5)
EOSINOPHIL NFR BLD AUTO: 1.2 % — SIGNIFICANT CHANGE UP (ref 0–6)
EPI CELLS # UR: SIGNIFICANT CHANGE UP
FIBRINOGEN PPP-MCNC: 533 MG/DL — HIGH (ref 330–520)
GLUCOSE SERPL-MCNC: 108 MG/DL — HIGH (ref 70–99)
GLUCOSE UR QL: NEGATIVE MG/DL — SIGNIFICANT CHANGE UP
HCT VFR BLD CALC: 43.7 % — SIGNIFICANT CHANGE UP (ref 34.5–45)
HGB BLD-MCNC: 14.1 G/DL — SIGNIFICANT CHANGE UP (ref 11.5–15.5)
IMM GRANULOCYTES NFR BLD AUTO: 0.1 % — SIGNIFICANT CHANGE UP (ref 0–0.9)
INR BLD: 1.1 RATIO — SIGNIFICANT CHANGE UP (ref 0.88–1.16)
KETONES UR-MCNC: NEGATIVE — SIGNIFICANT CHANGE UP
LDH SERPL L TO P-CCNC: 386 U/L — HIGH (ref 98–192)
LEUKOCYTE ESTERASE UR-ACNC: ABNORMAL
LYMPHOCYTES # BLD AUTO: 2.41 K/UL — SIGNIFICANT CHANGE UP (ref 1–3.3)
LYMPHOCYTES # BLD AUTO: 32.6 % — SIGNIFICANT CHANGE UP (ref 13–44)
MAGNESIUM SERPL-MCNC: 5.2 MG/DL — HIGH (ref 1.6–2.6)
MCHC RBC-ENTMCNC: 27.8 PG — SIGNIFICANT CHANGE UP (ref 27–34)
MCHC RBC-ENTMCNC: 32.3 GM/DL — SIGNIFICANT CHANGE UP (ref 32–36)
MCV RBC AUTO: 86.2 FL — SIGNIFICANT CHANGE UP (ref 80–100)
MONOCYTES # BLD AUTO: 0.42 K/UL — SIGNIFICANT CHANGE UP (ref 0–0.9)
MONOCYTES NFR BLD AUTO: 5.7 % — SIGNIFICANT CHANGE UP (ref 2–14)
NEUTROPHILS # BLD AUTO: 4.43 K/UL — SIGNIFICANT CHANGE UP (ref 1.8–7.4)
NEUTROPHILS NFR BLD AUTO: 59.9 % — SIGNIFICANT CHANGE UP (ref 43–77)
NITRITE UR-MCNC: NEGATIVE — SIGNIFICANT CHANGE UP
PH UR: 7 — SIGNIFICANT CHANGE UP (ref 5–8)
PLATELET # BLD AUTO: 385 K/UL — SIGNIFICANT CHANGE UP (ref 150–400)
POTASSIUM SERPL-MCNC: 4.2 MMOL/L — SIGNIFICANT CHANGE UP (ref 3.5–5.3)
POTASSIUM SERPL-SCNC: 4.2 MMOL/L — SIGNIFICANT CHANGE UP (ref 3.5–5.3)
PROT SERPL-MCNC: 7.1 G/DL — SIGNIFICANT CHANGE UP (ref 6.6–8.7)
PROT UR-MCNC: NEGATIVE — SIGNIFICANT CHANGE UP
PROTHROM AB SERPL-ACNC: 12.8 SEC — SIGNIFICANT CHANGE UP (ref 10.5–13.4)
RBC # BLD: 5.07 M/UL — SIGNIFICANT CHANGE UP (ref 3.8–5.2)
RBC # FLD: 12.4 % — SIGNIFICANT CHANGE UP (ref 10.3–14.5)
RBC CASTS # UR COMP ASSIST: ABNORMAL /HPF (ref 0–4)
SARS-COV-2 IGG+IGM SERPL QL IA: >250 U/ML — HIGH
SARS-COV-2 IGG+IGM SERPL QL IA: POSITIVE
SODIUM SERPL-SCNC: 141 MMOL/L — SIGNIFICANT CHANGE UP (ref 135–145)
SP GR SPEC: 1.01 — SIGNIFICANT CHANGE UP (ref 1.01–1.02)
URATE SERPL-MCNC: 4.8 MG/DL — SIGNIFICANT CHANGE UP (ref 2.4–5.7)
UROBILINOGEN FLD QL: NEGATIVE MG/DL — SIGNIFICANT CHANGE UP
WBC # BLD: 7.4 K/UL — SIGNIFICANT CHANGE UP (ref 3.8–10.5)
WBC # FLD AUTO: 7.4 K/UL — SIGNIFICANT CHANGE UP (ref 3.8–10.5)
WBC UR QL: ABNORMAL /HPF (ref 0–5)

## 2022-11-02 PROCEDURE — 99236 HOSP IP/OBS SAME DATE HI 85: CPT | Mod: GC

## 2022-11-02 PROCEDURE — 99221 1ST HOSP IP/OBS SF/LOW 40: CPT

## 2022-11-02 PROCEDURE — 99213 OFFICE O/P EST LOW 20 MIN: CPT

## 2022-11-02 RX ORDER — CEFTRIAXONE 500 MG/1
1000 INJECTION, POWDER, FOR SOLUTION INTRAMUSCULAR; INTRAVENOUS ONCE
Refills: 0 | Status: DISCONTINUED | OUTPATIENT
Start: 2022-11-02 | End: 2022-11-02

## 2022-11-02 RX ORDER — HYDRALAZINE HCL 50 MG
5 TABLET ORAL ONCE
Refills: 0 | Status: COMPLETED | OUTPATIENT
Start: 2022-11-02 | End: 2022-11-02

## 2022-11-02 RX ORDER — CEFTRIAXONE 500 MG/1
1000 INJECTION, POWDER, FOR SOLUTION INTRAMUSCULAR; INTRAVENOUS EVERY 24 HOURS
Refills: 0 | Status: DISCONTINUED | OUTPATIENT
Start: 2022-11-03 | End: 2022-11-04

## 2022-11-02 RX ORDER — ACETAMINOPHEN 500 MG
975 TABLET ORAL ONCE
Refills: 0 | Status: COMPLETED | OUTPATIENT
Start: 2022-11-02 | End: 2022-11-02

## 2022-11-02 RX ORDER — CEFTRIAXONE 500 MG/1
INJECTION, POWDER, FOR SOLUTION INTRAMUSCULAR; INTRAVENOUS
Refills: 0 | Status: DISCONTINUED | OUTPATIENT
Start: 2022-11-02 | End: 2022-11-02

## 2022-11-02 RX ORDER — HYDRALAZINE HCL 50 MG
10 TABLET ORAL ONCE
Refills: 0 | Status: COMPLETED | OUTPATIENT
Start: 2022-11-02 | End: 2022-11-02

## 2022-11-02 RX ORDER — NIFEDIPINE 30 MG
30 TABLET, EXTENDED RELEASE 24 HR ORAL EVERY 24 HOURS
Refills: 0 | Status: DISCONTINUED | OUTPATIENT
Start: 2022-11-02 | End: 2022-11-03

## 2022-11-02 RX ORDER — IBUPROFEN 200 MG
600 TABLET ORAL EVERY 6 HOURS
Refills: 0 | Status: DISCONTINUED | OUTPATIENT
Start: 2022-11-02 | End: 2022-11-04

## 2022-11-02 RX ORDER — CEFTRIAXONE 500 MG/1
1000 INJECTION, POWDER, FOR SOLUTION INTRAMUSCULAR; INTRAVENOUS ONCE
Refills: 0 | Status: COMPLETED | OUTPATIENT
Start: 2022-11-02 | End: 2022-11-02

## 2022-11-02 RX ORDER — MAGNESIUM SULFATE 500 MG/ML
4 VIAL (ML) INJECTION ONCE
Refills: 0 | Status: COMPLETED | OUTPATIENT
Start: 2022-11-02 | End: 2022-11-02

## 2022-11-02 RX ORDER — ACETAMINOPHEN 500 MG
975 TABLET ORAL EVERY 6 HOURS
Refills: 0 | Status: DISCONTINUED | OUTPATIENT
Start: 2022-11-02 | End: 2022-11-04

## 2022-11-02 RX ORDER — MAGNESIUM SULFATE 500 MG/ML
2 VIAL (ML) INJECTION
Qty: 40 | Refills: 0 | Status: DISCONTINUED | OUTPATIENT
Start: 2022-11-02 | End: 2022-11-04

## 2022-11-02 RX ORDER — CEFTRIAXONE 500 MG/1
INJECTION, POWDER, FOR SOLUTION INTRAMUSCULAR; INTRAVENOUS
Refills: 0 | Status: DISCONTINUED | OUTPATIENT
Start: 2022-11-02 | End: 2022-11-04

## 2022-11-02 RX ADMIN — Medication 600 MILLIGRAM(S): at 20:16

## 2022-11-02 RX ADMIN — Medication 300 GRAM(S): at 12:47

## 2022-11-02 RX ADMIN — Medication 30 MILLIGRAM(S): at 13:53

## 2022-11-02 RX ADMIN — CEFTRIAXONE 1000 MILLIGRAM(S): 500 INJECTION, POWDER, FOR SOLUTION INTRAMUSCULAR; INTRAVENOUS at 21:07

## 2022-11-02 RX ADMIN — Medication 5 MILLIGRAM(S): at 12:45

## 2022-11-02 RX ADMIN — Medication 10 MILLIGRAM(S): at 13:47

## 2022-11-02 RX ADMIN — Medication 10 MILLIGRAM(S): at 13:10

## 2022-11-02 RX ADMIN — Medication 975 MILLIGRAM(S): at 14:06

## 2022-11-02 RX ADMIN — Medication 50 GM/HR: at 13:20

## 2022-11-02 RX ADMIN — Medication 600 MILLIGRAM(S): at 19:04

## 2022-11-02 NOTE — DISCHARGE NOTE PROVIDER - NSDCFUSCHEDAPPT_GEN_ALL_CORE_FT
Gelacio Rojas [facility]  [address]  Scheduled Appointment: 11/09/2022    Gelacio Rojas [facility]  [address]  Scheduled Appointment: 11/21/2022

## 2022-11-02 NOTE — H&P ADULT - NSHPLABSRESULTS_GEN_ALL_CORE
14.1   7.40  )-----------( 385      ( 02 Nov 2022 12:41 )             43.7     11-02    141  |  103  |  14.1  ----------------------------<  108<H>  4.2   |  29.0  |  0.69    Ca    9.3      02 Nov 2022 12:41  TPro  7.1  /  Alb  3.8  /  TBili  0.5  /  DBili  x   /  AST  20  /  ALT  11  /  AlkPhos  82  11-02

## 2022-11-02 NOTE — CONSULT NOTE ADULT - PROBLEM SELECTOR RECOMMENDATION 3
Patient has been experiencing feelings of depression since delivery   - Psych consult   - Social Work consult - Tearful on exam  - FU psych consult   - FU social Work consult

## 2022-11-02 NOTE — H&P ADULT - ATTENDING COMMENTS
36year old  PPD#18 s/p  readmitted for PP PEC with severe features and PP Psychosis. Pt sent in from office for HA and elevated Bp 180/100  HR: 99 (2022 13:51) (71 - 101)  BP: 162/91 (2022 13:45) (162/91 - 189/104)  PPD# 18 s/p  with PP PEC and SF and PP psychosis   - PP PEC - required hydralazine IV push     - Magnesium    - PP Psychosis    - social work and psych consult        -  MFM consult    Lizeth Malhotra MD

## 2022-11-02 NOTE — CONSULT NOTE ADULT - PROBLEM SELECTOR RECOMMENDATION 2
Patient has had thoughts of harming her baby over the last few days  - Psych consult   - Social work consult - Endorsing thoughts of harming baby  - FU psych consult   - FU social work consult

## 2022-11-02 NOTE — DISCHARGE NOTE PROVIDER - NSDCCPCAREPLAN_GEN_ALL_CORE_FT
PRINCIPAL DISCHARGE DIAGNOSIS  Diagnosis: Preeclampsia in postpartum period  Assessment and Plan of Treatment:        PRINCIPAL DISCHARGE DIAGNOSIS  Diagnosis: Preeclampsia in postpartum period  Assessment and Plan of Treatment:       SECONDARY DISCHARGE DIAGNOSES  Diagnosis: Postpartum depression  Assessment and Plan of Treatment:

## 2022-11-02 NOTE — DISCHARGE NOTE PROVIDER - HOSPITAL COURSE
35yo  PPD#18 s/p uncomplicated  @38w4d (10/15/22) due to GDMA-2, admitted for management of postpartum preeclampsia and postpartum depression/psychosis.   Patient received magnesium sulfate for seizure prophylaxis. 35yo  PPD#18 s/p uncomplicated  @38w4d (10/15/22) due to GDMA-2, admitted for management of postpartum preeclampsia and postpartum depression/psychosis.   Patient received magnesium sulfate for seizure prophylaxis. BPs remained well controlled with standing procardia 60mg XL. Patient seen by inpatient behavioral health services and diagnosed with postpartum depression. Patient started on sertraline and will be transferred to [facility] for inpatient management. Patient agreeable with plan to continue medication treatment and pursue inpatient hospitalization for acute stabilization and continued med management/monitoring.

## 2022-11-02 NOTE — CONSULT NOTE ADULT - PROBLEM SELECTOR RECOMMENDATION 9
Patient has had multiple readings of severe range  blood pressures (170-180/)  -PEC labs  - Hydralazine  - Mag - severe blood pressures (170-180/)  - s/p IVP hydralazine 5/10/10mg  - Continue Mag for 24hrs for maternal seizure ppx  - start PO procardia 30mg daily

## 2022-11-02 NOTE — H&P ADULT - HISTORY OF PRESENT ILLNESS
SURINDER ORTIZ A 36year old  PPD#18 s/p  readmitted for PP PEC with severe features and PP Psychosis.     SUBJECTIVE:   Patient presented to L&D from outpatient office for elevated blood pressures. Patient also endorsing headache that is 10/10 in severity. Denies changes in vision, epigastric pain and new onset swelling. Patient also endorsing thoughts of harming baby earlier today; states baby is currently at home with mother-in-law. Patient recalls having similar thoughts last pregnancy, however did not admit them to a medical provider at that time. Patient states that she has been feeling depressed since delivery, crying for a lot of the day, tired, and feel a lack of desire to do anything. Patient c/o of dysuria. States lochia is mild, current bottle feeding. Denies nausea, vomiting, CP, SOB.     PAST 24H:  - s/p Hydral IVP 5mg/10mg/10mg   - on Magnesium  - Tylenol 975mg PO for HA     POB:   FT  c/b GDMA (2017)  FT  c/b GDMA2 ()  SABx6  TOPx1  PGYN: denies hx of ovarian cysts, fibroids, STIs and abnormal paps  PMH: anxiety  SurgHx: denies  SocialHx: Hx of PP depression, denies EtOH, tobacco and illicit drug use  FamHx: denies  Meds: ibuprofen PRN for pain  All: NKDA

## 2022-11-02 NOTE — CONSULT NOTE ADULT - ATTENDING COMMENTS
She has postpartum preeclampsia and suspected postpartum psychosis or depression. Standard preeclampsia care. Psychiatry evaluation ASAP.

## 2022-11-02 NOTE — H&P ADULT - PROBLEM SELECTOR PLAN 1
- severe blood pressures (170-180/)  - s/p IVP hydralazine 5/10/10mg  - Continue Mag for 24hrs for maternal seizure ppx  - start PO procardia 30mg daily

## 2022-11-02 NOTE — DISCHARGE NOTE PROVIDER - NSDCMRMEDTOKEN_GEN_ALL_CORE_FT
ibuprofen 600 mg oral tablet: 1 tab(s) orally every 6 hours, As Needed -for mild pain   Tylenol 325 mg oral tablet: 2 tab(s) orally every 6 hours, As Needed -for moderate pain    ibuprofen 600 mg oral tablet: 1 tab(s) orally every 6 hours, As Needed -for mild pain   melatonin 3 mg oral tablet: 1 tab(s) orally once a day (at bedtime)  mirtazapine 7.5 mg oral tablet: 1 tab(s) orally once a day (at bedtime), As needed, insomnia  Procardia XL 60 mg oral tablet, extended release: 1 tab(s) orally once a day   Tylenol 325 mg oral tablet: 2 tab(s) orally every 6 hours, As Needed -for moderate pain   Zoloft 25 mg oral tablet: 1 tab(s) orally once a day

## 2022-11-02 NOTE — H&P ADULT - ASSESSMENT
SURINDER ORTIZ A 36year old  PPD#18 s/p  readmitted for PP PEC with severe features and PP Psychosis.

## 2022-11-02 NOTE — OB POSTPARTUM EVENT NOTE - NS_EVENTSUMMARY1_OBGYN_ALL_OB_FT
Patient presents to triage complaining of high blood pressure at her office appointment this morning and a severe headache.  Patient also reports blurred vision.

## 2022-11-02 NOTE — H&P ADULT - NSHPPHYSICALEXAM_GEN_ALL_CORE
Vital Signs:  HR: 99 (02 Nov 2022 13:51) (71 - 101)  BP: 162/91 (02 Nov 2022 13:45) (162/91 - 189/104)  SpO2: 99% (02 Nov 2022 13:51) (97% - 99%)    Physical Exam:  General: Adult female AAOx3, tearful  CVS: RRR, +S1/S2, no murmurs  Lungs: CTAB, no wheezing, rhonchi or rales  Abdomen: soft, no RUQ tenderness to palpation, mild suprapubic tenderness to palpation  Ext: No cyanosis, edema or calf tenderness bilaterally

## 2022-11-02 NOTE — CONSULT NOTE ADULT - ASSESSMENT
SURINDERNARESH ORTIZ A 36year old  PPD#18 s/p  readmitted for PP PEC with severe features and PP Psychosis.   Patient presented to L&D for elevated blood pressures at Dr. Rojas's office today. The patient had a blood pressure of 180/102 at 1102 in the office and 183/102 in L&D at 1240 and has remained elevated since admission. The patient has also had a headache that is 10/10 in severity. Additionally the patient has been experiencing PP Psychosis and Depression since delivery.  SURINDER ORTIZ A 36year old  PPD#18 s/p  readmitted for PP PEC with severe features and PP Psychosis.    SURINDER ORTIZ A 36year old  PPD#18 s/p . She has PP PEC with severe features and suspected PP Psychosis/Depression.

## 2022-11-02 NOTE — DISCHARGE NOTE PROVIDER - CARE PROVIDER_API CALL
Gelacio Rojas)  Obstetrics and Gynecology  [address], [address]  [place] [address]  Phone: [phone]  Fax: [phone]  Follow Up Time:

## 2022-11-03 LAB
ALBUMIN SERPL ELPH-MCNC: 3.8 G/DL — SIGNIFICANT CHANGE UP (ref 3.3–5.2)
ALP SERPL-CCNC: 87 U/L — SIGNIFICANT CHANGE UP (ref 40–120)
ALT FLD-CCNC: 11 U/L — SIGNIFICANT CHANGE UP
ANION GAP SERPL CALC-SCNC: 13 MMOL/L — SIGNIFICANT CHANGE UP (ref 5–17)
AST SERPL-CCNC: 10 U/L — SIGNIFICANT CHANGE UP
BASOPHILS # BLD AUTO: 0.06 K/UL — SIGNIFICANT CHANGE UP (ref 0–0.2)
BASOPHILS NFR BLD AUTO: 0.7 % — SIGNIFICANT CHANGE UP (ref 0–2)
BILIRUB SERPL-MCNC: 0.4 MG/DL — SIGNIFICANT CHANGE UP (ref 0.4–2)
BUN SERPL-MCNC: 9.8 MG/DL — SIGNIFICANT CHANGE UP (ref 8–20)
CALCIUM SERPL-MCNC: 7.3 MG/DL — LOW (ref 8.4–10.5)
CHLORIDE SERPL-SCNC: 103 MMOL/L — SIGNIFICANT CHANGE UP (ref 96–108)
CO2 SERPL-SCNC: 28 MMOL/L — SIGNIFICANT CHANGE UP (ref 22–29)
CREAT SERPL-MCNC: 0.64 MG/DL — SIGNIFICANT CHANGE UP (ref 0.5–1.3)
EGFR: 117 ML/MIN/1.73M2 — SIGNIFICANT CHANGE UP
EOSINOPHIL # BLD AUTO: 0.09 K/UL — SIGNIFICANT CHANGE UP (ref 0–0.5)
EOSINOPHIL NFR BLD AUTO: 1.1 % — SIGNIFICANT CHANGE UP (ref 0–6)
GLUCOSE SERPL-MCNC: 111 MG/DL — HIGH (ref 70–99)
HCT VFR BLD CALC: 45.4 % — HIGH (ref 34.5–45)
HGB BLD-MCNC: 14.6 G/DL — SIGNIFICANT CHANGE UP (ref 11.5–15.5)
IMM GRANULOCYTES NFR BLD AUTO: 0.2 % — SIGNIFICANT CHANGE UP (ref 0–0.9)
LDH SERPL L TO P-CCNC: 225 U/L — HIGH (ref 98–192)
LYMPHOCYTES # BLD AUTO: 2.37 K/UL — SIGNIFICANT CHANGE UP (ref 1–3.3)
LYMPHOCYTES # BLD AUTO: 28.8 % — SIGNIFICANT CHANGE UP (ref 13–44)
MAGNESIUM SERPL-MCNC: 5.8 MG/DL — HIGH (ref 1.6–2.6)
MAGNESIUM SERPL-MCNC: 6.3 MG/DL — HIGH (ref 1.6–2.6)
MCHC RBC-ENTMCNC: 27.9 PG — SIGNIFICANT CHANGE UP (ref 27–34)
MCHC RBC-ENTMCNC: 32.2 GM/DL — SIGNIFICANT CHANGE UP (ref 32–36)
MCV RBC AUTO: 86.6 FL — SIGNIFICANT CHANGE UP (ref 80–100)
MONOCYTES # BLD AUTO: 0.41 K/UL — SIGNIFICANT CHANGE UP (ref 0–0.9)
MONOCYTES NFR BLD AUTO: 5 % — SIGNIFICANT CHANGE UP (ref 2–14)
NEUTROPHILS # BLD AUTO: 5.29 K/UL — SIGNIFICANT CHANGE UP (ref 1.8–7.4)
NEUTROPHILS NFR BLD AUTO: 64.2 % — SIGNIFICANT CHANGE UP (ref 43–77)
PLATELET # BLD AUTO: 412 K/UL — HIGH (ref 150–400)
POTASSIUM SERPL-MCNC: 3.7 MMOL/L — SIGNIFICANT CHANGE UP (ref 3.5–5.3)
POTASSIUM SERPL-SCNC: 3.7 MMOL/L — SIGNIFICANT CHANGE UP (ref 3.5–5.3)
PROT SERPL-MCNC: 7.2 G/DL — SIGNIFICANT CHANGE UP (ref 6.6–8.7)
RBC # BLD: 5.24 M/UL — HIGH (ref 3.8–5.2)
RBC # FLD: 12.6 % — SIGNIFICANT CHANGE UP (ref 10.3–14.5)
SARS-COV-2 RNA SPEC QL NAA+PROBE: SIGNIFICANT CHANGE UP
SODIUM SERPL-SCNC: 144 MMOL/L — SIGNIFICANT CHANGE UP (ref 135–145)
URATE SERPL-MCNC: 3.9 MG/DL — SIGNIFICANT CHANGE UP (ref 2.4–5.7)
WBC # BLD: 8.24 K/UL — SIGNIFICANT CHANGE UP (ref 3.8–10.5)
WBC # FLD AUTO: 8.24 K/UL — SIGNIFICANT CHANGE UP (ref 3.8–10.5)

## 2022-11-03 PROCEDURE — 99233 SBSQ HOSP IP/OBS HIGH 50: CPT | Mod: GC

## 2022-11-03 PROCEDURE — 99223 1ST HOSP IP/OBS HIGH 75: CPT

## 2022-11-03 RX ORDER — SODIUM CHLORIDE 9 MG/ML
1000 INJECTION, SOLUTION INTRAVENOUS
Refills: 0 | Status: DISCONTINUED | OUTPATIENT
Start: 2022-11-03 | End: 2022-11-04

## 2022-11-03 RX ORDER — MIRTAZAPINE 45 MG/1
7.5 TABLET, ORALLY DISINTEGRATING ORAL AT BEDTIME
Refills: 0 | Status: DISCONTINUED | OUTPATIENT
Start: 2022-11-03 | End: 2022-11-04

## 2022-11-03 RX ORDER — LANOLIN ALCOHOL/MO/W.PET/CERES
3 CREAM (GRAM) TOPICAL AT BEDTIME
Refills: 0 | Status: DISCONTINUED | OUTPATIENT
Start: 2022-11-03 | End: 2022-11-04

## 2022-11-03 RX ORDER — SERTRALINE 25 MG/1
25 TABLET, FILM COATED ORAL DAILY
Refills: 0 | Status: DISCONTINUED | OUTPATIENT
Start: 2022-11-03 | End: 2022-11-04

## 2022-11-03 RX ORDER — NIFEDIPINE 30 MG
60 TABLET, EXTENDED RELEASE 24 HR ORAL EVERY 24 HOURS
Refills: 0 | Status: DISCONTINUED | OUTPATIENT
Start: 2022-11-03 | End: 2022-11-04

## 2022-11-03 RX ADMIN — Medication 600 MILLIGRAM(S): at 06:49

## 2022-11-03 RX ADMIN — Medication 60 MILLIGRAM(S): at 08:30

## 2022-11-03 RX ADMIN — Medication 50 GM/HR: at 08:16

## 2022-11-03 RX ADMIN — CEFTRIAXONE 1000 MILLIGRAM(S): 500 INJECTION, POWDER, FOR SOLUTION INTRAMUSCULAR; INTRAVENOUS at 18:16

## 2022-11-03 RX ADMIN — SERTRALINE 25 MILLIGRAM(S): 25 TABLET, FILM COATED ORAL at 15:26

## 2022-11-03 RX ADMIN — SODIUM CHLORIDE 75 MILLILITER(S): 9 INJECTION, SOLUTION INTRAVENOUS at 08:16

## 2022-11-03 RX ADMIN — Medication 3 MILLIGRAM(S): at 22:04

## 2022-11-03 RX ADMIN — Medication 600 MILLIGRAM(S): at 07:30

## 2022-11-03 NOTE — BH CONSULTATION LIAISON ASSESSMENT NOTE - OTHER
dysphoric, tearful, crying at points intrusive, ego-dystonic HI; passive SI; guilt ridden organized, coherent

## 2022-11-03 NOTE — BH CONSULTATION LIAISON ASSESSMENT NOTE - RISK ASSESSMENT
Risk factors:  - current affective symptoms  - current passive SI  - current intrusive, ego-dystonic HI    Protective factors:  - no history of suicide attempts  - no history of violence  - help-seeking

## 2022-11-03 NOTE — BH CONSULTATION LIAISON ASSESSMENT NOTE - NSACTIVEVENT_PSY_ALL_CORE
Triggering events leading to humiliation, shame, and/or despair (e.g., Loss of relationship, financial or health status) (real or anticipated)/Chronic pain or other acute medical condition/Inadequate social supports/Perceived burden on family or others/Recent onset of psychiatric illness

## 2022-11-03 NOTE — BH CONSULTATION LIAISON ASSESSMENT NOTE - NSBHCONSULTFOLLOW_PSY_ALL_CORE
CC:  Scarlet BRAVO [place] is here today for   Chief Complaint   Patient presents with   • Office Visit     established patient, neck pain when facing down. for 2 years      .      PCP Spring Chamorro PA-C    Medications: medications verified, no change  Refills needed today?  NO  denies known Latex allergy or symptoms of Latex sensitivity.  Patient would like communication of their results via:      Cell Phone:   Telephone Information:   Mobile [phone]     Okay to leave a message containing results? Yes  Tobacco history: verified                Yes...

## 2022-11-03 NOTE — BH CONSULTATION LIAISON ASSESSMENT NOTE - NSBHCHARTREVIEWVS_PSY_A_CORE FT
Vital Signs Last 24 Hrs  T(C): 36.9 (03 Nov 2022 11:00), Max: 37.1 (03 Nov 2022 01:00)  T(F): 98.4 (03 Nov 2022 11:00), Max: 98.8 (03 Nov 2022 01:00)  HR: 94 (03 Nov 2022 12:17) (69 - 110)  BP: 134/82 (03 Nov 2022 12:07) (106/54 - 163/85)  BP(mean): --  RR: 16 (03 Nov 2022 13:00) (16 - 16)  SpO2: 100% (03 Nov 2022 12:17) (86% - 100%)

## 2022-11-03 NOTE — BH CONSULTATION LIAISON ASSESSMENT NOTE - EMPLOYMENT
Please help arrange transportation for patient to complete BIPAP titration     Here are some tips to to getting better sleep  1- Avoid napping during the day: This will ensure you are tired at bedtime. If you have to take a nap, sleep less than one hour, before 3 pm.   2- Exercise regularly, but not right before bed: but the timing of the workout is important. Exercising in the morning or early afternoon will not interfere with sleep. Exercising within two hours before bedtime can decrease your ability to fall asleep. Regular exercise is recommended to help you deepen the sleep. 3- Avoid heavy, spicy, or sugary foods 4-6 hours before bedtime: These can affect your ability to stay asleep. 4- Have a light snack before bed: Having an empty stomach can interfere with your sleep. Dairy products and turkey contain tryptophan, which acts as a natural sleep inducer. 5- Stay away from caffeine, nicotine and alcohol at least 4-6 hours before bed: Caffeine and nicotine are stimulants that interfere with your ability to fall asleep. While alcohol has an immediate sleep-inducing effect, a few hours later, as alcohol levels in your blood start to fall, there is a stimulant effect and you will experience fragmented sleep. 6- Take a hot bath 90 minutes before bedtime:  A hot bath will raise your body temperature, but it is the drop in body temperature that may leave you feeling sleepy  7- Develop sleep rituals: it is important to give your body cues that it is time to slow down and sleep. Listen to relaxing music, read something soothing for 15 minutes, have a cup of caffeine free tea, or do relaxation exercises such as yoga or deep breathing help relieve anxiety and reduce muscle tension. 8- Fix a bedtime and an awakening time: Even on weekends! When your sleep cycle has a regular rhythm, you will feel better. 9- Sleep only when sleepy: This reduces the time you are awake in bed.    10- Get into your favorite sleeping position: If you can't fall asleep within 15-30 minutes, get up and do something boring until you feel sleepy. Sit quietly in the dark or read the warranty on your refrigerator. Don't expose yourself to bright light while you are up, it gives cues to your brain that it is time to wake up. 11- Only use your bed for sleeping: Dont use the bed as an office, workroom or recreation room. Let your body \"know\" that the bed is associated with sleeping  12- Use comfortable bedding. Uncomfortable bedding can prevent good sleep. Evaluate whether or not this is a source of your problem, and make appropriate changes. 13- Make sure your bed and bedroom are quiet and comfortable: A hot room can be uncomfortable. A cooler room, along with enough blankets to stay warm is recommended. Get a blackout shade or wear a slumber mask and wear earplugs or get a \"white noise\" machine for light and noise distractions. 14- Use sunlight to set your biological clock: When you get up in the morning, go outside and turn your face to the sun for 15 minutes. 13- Dont take your worries to bed: Leave worries about job, school, daily life, etc., behind when you go to bed. Some people find it useful to assign a \"worry period\" during the evening or afternoon for these issues. Employed

## 2022-11-03 NOTE — BH CONSULTATION LIAISON ASSESSMENT NOTE - NSBHCONSULTPRIMARYDISCUSSYES_PSY_A_CORE FT
Spoke with Dr. Monica Carver. Discussed diagnosis, plan, treatment, and need for inpatient psych admission

## 2022-11-03 NOTE — PROGRESS NOTE ADULT - PROBLEM SELECTOR PLAN 2
- Reports feelings of harm toward infant  - Psychiatry and social work urgent consults pending  - Patient displays no feelings of harm towards self at bedside  - Infant safe at home with FOB and MIL. - History of postpartum depression with prior pregnancy.   - Reports depressive symptoms at bedside  - Psychiatry evaluation today

## 2022-11-03 NOTE — BH CONSULTATION LIAISON ASSESSMENT NOTE - KNOWN PSYCHIATRIC ADMISSION WITHIN THE PAST 30 DAYS
Called to bedside to pronounce death after patient was noted to be in asystole at 3:35 5/27/2020. NO pupillary, corneal, doll's eye or gag reflex noted. NO heart sounds or pulse noted. NO Chest rise or Lung sounds noted. NO Response to painful stimuli  Time of death declared at 3:35.   Zoya Shah MD   5/27/2020  3:50 AM No

## 2022-11-03 NOTE — PROGRESS NOTE ADULT - SUBJECTIVE AND OBJECTIVE BOX
SURINDER ORTIZ is a 35yo  PPD#19 s/p  @38w4d after IOL for GDMA-2 (10/15/22) readmitted for postpartum preeclampsia due to severe range BPs requiring Hydralazing 5/10/10 and MgSO4, as well as postpartum depression vs. psychosis       PAST 24H:  - BPs overnight: 122-155/69-87  - MgSO4 at 1247  - Procardia 30mg @ 1353, UO 2500cc overnight      SUBJECTIVE:  Patient seen at bedside this AM.  Reporting improved bilateral frontal headache this AM after receiving PRN pain medications.  Reports improved drowsiness as well.  Tolerating regular diet.  Denies any visual disturbances or epigastric pain.      Vital Signs Last 24 Hrs  T(C): 37.1 (2022 01:00), Max: 37.1 (2022 01:00)  T(F): 98.8 (2022 01:00), Max: 98.8 (2022 01:00)  HR: 90 (2022 07:11) (69 - 110)  BP: 135/78 (2022 07:07) (106/54 - 189/104)  SpO2: 96% (2022 07:06) (89% - 100%)          Physical Exam:  General: Adult female in NAD  CVS: RRR  Lungs: CTAB  Abdomen: soft, non-tender, gravid uterus  Pelvic: Minimal lochia noted  Ext: No cyanosis, edema or calf tenderness  Skin: No rashes or lesions on exposed skin  Neuro: Normal DTRs, grossly intact    Labs:                          14.1   7.40  )-----------( 385      ( 2022 12:41 )             43.7     11-02    141  |  103  |  14.1  ----------------------------<  108<H>  4.2   |  29.0  |  0.69    Ca    9.3      2022 12:41  Mg     5.8     11-03    TPro  7.1  /  Alb  3.8  /  TBili  0.5  /  DBili  x   /  AST  20  /  ALT  11  /  AlkPhos  82  11-02    PT/INR - ( 2022 12:41 )   PT: 12.8 sec;   INR: 1.10 ratio         PTT - ( 2022 12:41 )  PTT:32.4 sec      MEDICATIONS  (STANDING):  cefTRIAXone Injectable. 1000 milliGRAM(s) IV Push every 24 hours  cefTRIAXone Injectable.      lactated ringers. 1000 milliLiter(s) (75 mL/Hr) IV Continuous <Continuous>  magnesium sulfate Infusion 2 Gm/Hr (50 mL/Hr) IV Continuous <Continuous>  NIFEdipine XL 60 milliGRAM(s) Oral every 24 hours    MEDICATIONS  (PRN):  acetaminophen     Tablet .. 975 milliGRAM(s) Oral every 6 hours PRN Mild Pain (1 - 3), Moderate Pain (4 - 6), Severe Pain (7 - 10)  ibuprofen  Tablet. 600 milliGRAM(s) Oral every 6 hours PRN Mild Pain (1 - 3), Moderate Pain (4 - 6), Severe Pain (7 - 10)   SURINDER ORTIZ is a 35yo  PPD#19 s/p  @38w4d after IOL for GDMA-2 (10/15/22) readmitted for postpartum preeclampsia due to severe range BPs requiring Hydralazing 5/10/10 and MgSO4, as well as postpartum depression vs. psychosis.     PAST 24H:  - BPs overnight: 122-155/69-87  - MgSO4 at 1247  - Procardia 30mg @ 1353, UO 2500cc overnight    SUBJECTIVE:  Patient seen at bedside this AM.  Reporting improved bilateral frontal headache this AM after receiving PRN pain medications.  Reports improved drowsiness as well.  Tolerating regular diet.  Denies any visual disturbances or epigastric pain.    Vital Signs Last 24 Hrs  T(C): 37.1 (2022 01:00), Max: 37.1 (2022 01:00)  T(F): 98.8 (2022 01:00), Max: 98.8 (2022 01:00)  HR: 90 (2022 07:11) (69 - 110)  BP: 135/78 (2022 07:07) (106/54 - 189/104)  SpO2: 96% (2022 07:06) (89% - 100%)    Physical Exam:  General: Adult female in NAD  CVS: RRR  Lungs: CTAB  Abdomen: soft, non-tender, gravid uterus  Pelvic: Minimal lochia noted  Ext: No cyanosis, edema or calf tenderness  Skin: No rashes or lesions on exposed skin  Neuro: Normal DTRs, grossly intact    Labs:                     14.1   7.40  )-----------( 385      ( 2022 12:41 )             43.7     11-    141  |  103  |  14.1  ----------------------------<  108<H>  4.2   |  29.0  |  0.69    Ca    9.3      2022 12:41  Mg     5.8     11-03    TPro  7.1  /  Alb  3.8  /  TBili  0.5  /  DBili  x   /  AST  20  /  ALT  11  /  AlkPhos  82  11-02    PT/INR - ( 2022 12:41 )   PT: 12.8 sec;   INR: 1.10 ratio       PTT - ( 2022 12:41 )  PTT:32.4 sec    MEDICATIONS  (STANDING):  cefTRIAXone Injectable. 1000 milliGRAM(s) IV Push every 24 hours  cefTRIAXone Injectable.      lactated ringers. 1000 milliLiter(s) (75 mL/Hr) IV Continuous <Continuous>  magnesium sulfate Infusion 2 Gm/Hr (50 mL/Hr) IV Continuous <Continuous>  NIFEdipine XL 60 milliGRAM(s) Oral every 24 hours    MEDICATIONS  (PRN):  acetaminophen     Tablet .. 975 milliGRAM(s) Oral every 6 hours PRN Mild Pain (1 - 3), Moderate Pain (4 - 6), Severe Pain (7 - 10)  ibuprofen  Tablet. 600 milliGRAM(s) Oral every 6 hours PRN Mild Pain (1 - 3), Moderate Pain (4 - 6), Severe Pain (7 - 10)   SURINDER ORTIZ is a 37yo  PPD#19 s/p  @38w4d after IOL for GDMA-2 (10/15/22) readmitted for postpartum preeclampsia due to severe range BPs requiring Hydralazing 5/10/10 and MgSO4, as well as postpartum depression vs. psychosis.     PAST 24H:  - BPs overnight: 122-155/69-87  - MgSO4 at 1247  - Procardia 30mg @ 1353, UO 2500cc overnight    SUBJECTIVE:  Patient seen at bedside this AM.  Reporting improved bilateral frontal headache this AM after receiving PRN pain medications.  Reports improved drowsiness as well.  Tolerating regular diet.  Denies any visual disturbances or epigastric pain.    Vital Signs Last 24 Hrs  T(C): 37.1 (2022 01:00), Max: 37.1 (2022 01:00)  T(F): 98.8 (2022 01:00), Max: 98.8 (2022 01:00)  HR: 90 (2022 07:11) (69 - 110)  BP: 135/78 (2022 07:07) (106/54 - 189/104)  SpO2: 96% (2022 07:06) (89% - 100%)    Physical Exam:  General: Adult female in NAD  CVS: RRR  Lungs: CTAB  Abdomen: soft, non-tender  Pelvic: Minimal lochia noted  Ext: No cyanosis, edema or calf tenderness  Skin: No rashes or lesions on exposed skin  Neuro: Normal DTRs, grossly intact    Labs:                     14.1   7.40  )-----------( 385      ( 2022 12:41 )             43.7     11-02    141  |  103  |  14.1  ----------------------------<  108<H>  4.2   |  29.0  |  0.69    Ca    9.3      2022 12:41  Mg     5.8     11-03    TPro  7.1  /  Alb  3.8  /  TBili  0.5  /  DBili  x   /  AST  20  /  ALT  11  /  AlkPhos  82  11-02    PT/INR - ( 2022 12:41 )   PT: 12.8 sec;   INR: 1.10 ratio       PTT - ( 2022 12:41 )  PTT:32.4 sec    MEDICATIONS  (STANDING):  cefTRIAXone Injectable. 1000 milliGRAM(s) IV Push every 24 hours  cefTRIAXone Injectable.      lactated ringers. 1000 milliLiter(s) (75 mL/Hr) IV Continuous <Continuous>  magnesium sulfate Infusion 2 Gm/Hr (50 mL/Hr) IV Continuous <Continuous>  NIFEdipine XL 60 milliGRAM(s) Oral every 24 hours    MEDICATIONS  (PRN):  acetaminophen     Tablet .. 975 milliGRAM(s) Oral every 6 hours PRN Mild Pain (1 - 3), Moderate Pain (4 - 6), Severe Pain (7 - 10)  ibuprofen  Tablet. 600 milliGRAM(s) Oral every 6 hours PRN Mild Pain (1 - 3), Moderate Pain (4 - 6), Severe Pain (7 - 10)

## 2022-11-03 NOTE — PROVIDER CONTACT NOTE (CRITICAL VALUE NOTIFICATION) - SITUATION
md's aware spec with insufficient volume. previous lab values checked by md's. as per dr granado, no need to redraw new specimen at this time.

## 2022-11-03 NOTE — BH CONSULTATION LIAISON ASSESSMENT NOTE - CURRENT MEDICATION
MEDICATIONS  (STANDING):  cefTRIAXone Injectable. 1000 milliGRAM(s) IV Push every 24 hours  cefTRIAXone Injectable.      lactated ringers. 1000 milliLiter(s) (75 mL/Hr) IV Continuous <Continuous>  magnesium sulfate Infusion 2 Gm/Hr (50 mL/Hr) IV Continuous <Continuous>  melatonin 3 milliGRAM(s) Oral at bedtime  NIFEdipine XL 60 milliGRAM(s) Oral every 24 hours  sertraline 25 milliGRAM(s) Oral daily    MEDICATIONS  (PRN):  acetaminophen     Tablet .. 975 milliGRAM(s) Oral every 6 hours PRN Mild Pain (1 - 3), Moderate Pain (4 - 6), Severe Pain (7 - 10)  ibuprofen  Tablet. 600 milliGRAM(s) Oral every 6 hours PRN Mild Pain (1 - 3), Moderate Pain (4 - 6), Severe Pain (7 - 10)  mirtazapine 7.5 milliGRAM(s) Oral at bedtime PRN insomnia

## 2022-11-03 NOTE — BH CONSULTATION LIAISON ASSESSMENT NOTE - NSBHCONSULTMEDSLEEP_PSY_A_CORE FT
-For insomnia: Melatonin 3 mg HS PRN (1st line); Remeron 7.5 mg HS PRN (2nd line if Melatonin ineffective)

## 2022-11-03 NOTE — BH CONSULTATION LIAISON ASSESSMENT NOTE - SUMMARY
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 Patient initially sent from outpatient office to hospital for elevated BP, proteinuria, as well as depressive symptoms and reported HI towards . Patient currently admitted in Labor and Delivery dept. Psychiatry consulted for postpartum depression vs psychosis and homicidal ideations.    Assessment: Patient presentation with depressed mood, tearfulness, passive SI, HI towards , is most consistent with MDD with peripartum onset. Does not appear to have psychotic component to symptoms; patient is organized, coherent, no observed or reported difficulties with thought process, HI described intrusive, ego-dystonic thoughts NOT as AH or command type hallucinations, no delusional thought content elicited.     Untreated depression poses risks to the mother and infant, such as nonadherence with  care, poor self-care, neglect of the infant (and other children), disrupted maternal-infant bonding, family dysfunction, child abuse, and suicidal/homicidal behavior; also, complications of depression may ensue, including psychotic features, catatonia, and comorbid substance use disorders. Given such risks and potential sequelae for patient and her children should symptoms persist, prompt treatment is needed to address these symptoms. Recommend initiating Zoloft 25 mg daily; amongst, antidepressant therapies in post-partum period Zoloft has the most supporting evidence for efficacy and safety in the literature. On a further note, patient is not breastfeeding, however, if she were or should she, Zoloft is also the most researched and supported amongst antidepressant treatment in breastfeeding mother with little to no transmission in breast milk and no reported associated effects of such transmission reported in literature.     Furthermore, given acute safety risk to patient and her  infant, patient also meets criteria for involuntary inpatient hospitalization for acute stabilization of psychiatric symptoms and mitigation of safety risk. Does not need 1:1 observation at this time given lack of active SI, no history of self-harm.     Ddx:  -MDD with peripartum onset    Plan:  -START Zoloft 25 mg HS. Rx for MDD.  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 Patient initially sent from outpatient office to hospital for elevated BP, proteinuria, as well as depressive symptoms and reported HI towards . Patient currently admitted in Labor and Delivery dept. Psychiatry consulted for postpartum depression vs psychosis and homicidal ideations.    Assessment: Patient presentation with depressed mood, tearfulness, passive SI, HI towards , is most consistent with MDD with peripartum onset. Does not appear to have psychotic component to symptoms; patient is organized, coherent, no observed or reported difficulties with thought process, HI described intrusive, ego-dystonic thoughts NOT as AH or command type hallucinations, no delusional thought content elicited.     Untreated depression poses risks to the mother and infant, such as nonadherence with  care, poor self-care, neglect of the infant (and other children), disrupted maternal-infant bonding, family dysfunction, child abuse, and suicidal/homicidal behavior; also, complications of depression may ensue, including psychotic features, catatonia, and comorbid substance use disorders. Given such risks and potential sequelae for patient and her children should symptoms persist, prompt treatment is needed to address these symptoms. Recommend initiating Zoloft 25 mg daily; amongst, antidepressant therapies in post-partum period Zoloft has the most supporting evidence for efficacy and safety in the literature. On a further note, patient is not breastfeeding, however, if she were or should she, Zoloft is also the most researched and supported amongst antidepressant treatment in breastfeeding mother with little to no transmission in breast milk and no reported associated effects of such transmission reported in literature.     Furthermore, given acute safety risk to patient and her  infant, patient also meets criteria for involuntary inpatient hospitalization for acute stabilization of psychiatric symptoms and mitigation of safety risk. Does not need 1:1 observation at this time given lack of active SI, may benefit from a camera room if possible. no history of self-harm.     Ddx:  -MDD with peripartum onset    Plan:  -START Zoloft 25 mg HS. Rx for MDD.  -For insomnia: Melatonin 3 mg HS PRN (1st line); Remeron 7.5 mg HS PRN (2nd line if Melatonin ineffective)  -2PC admission for inpatient psychiatric hospitalization  -Does not need 1:1 observation at this time, may benefit from camera room

## 2022-11-03 NOTE — PROGRESS NOTE ADULT - PROBLEM SELECTOR PLAN 1
- Diagnosis based on severe range BPs requiring anti-hypertensives.  - MgSO4 initiated @ 1247 for seizure prophylaxis, continue for 24h. Mg levels therapeutic   - BPs overnight: 122-155/69-87, on standing procardia 30mg overnight. Will uptitrate to procardia 60mg with hold parameters  - Ibuprofen and tylenol for pain control  - Cardio Obstetrics program at time of discharge - Diagnosis based on severe range BPs requiring anti-hypertensives.  - MgSO4 initiated @ 1247 for seizure prophylaxis, continue for 24h. Mg levels therapeutic   - BPs overnight: 122-155/69-87, on standing procardia 30mg overnight. Will uptitrate to procardia 60mg with hold   parameters  - Ibuprofen and tylenol for pain control  - Cardio Obstetrics program at time of discharge

## 2022-11-03 NOTE — BH CONSULTATION LIAISON ASSESSMENT NOTE - HPI (INCLUDE ILLNESS QUALITY, SEVERITY, DURATION, TIMING, CONTEXT, MODIFYING FACTORS, ASSOCIATED SIGNS AND SYMPTOMS)
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 Patient initially sent from outpatient office to hospital for elevated BP, proteinuria, as well as depressive symptoms and reported HI towards . Patient currently admitted in Labor and Delivery dept. Psychiatry consulted for postpartum depression vs psychosis and homicidal ideations.    On encounter, patient is awake, alert, organized, dysphoric, and intermittently tearful and crying. Patient reports that since birth of her daughter on Oct 15, 2022, she has had depressed mood, difficulty sleeping, poor appetite, low energy, feelings of guilt and low self-esteem, as well as increased tearfulness and crying a lot each day. Reports associated passive SI, described as "wanting it to all go away," denies active ideation, plan, or intent. Patient denies history of SI, self-harm, or suicide attempts in the past. Patient also reports symptom of having intermittent intrusive thoughts to harm her  baby. Patient states that when she is boiling water to heat up formula (patient not breastfeeding) or to cook, she has had intrusive thought of throwing her baby into the water. This causes patient considerable distress, she is crying and endorsing guilt/remorse when discussing this symptom. Patient states that she does not want to do this and has been able to control this impulse up to know. Denies hurting or attempting to hurt baby or either of her children. Denies AH/VH, no command hallucinations, no paranoia elicited, does not feel that baby means her harm, does not feel that anyone else means harm to her or her child. Patient reports similar episode with birth of her 4 yo son, reports that she did not seek help at that time, kept symptoms to herself, and it ultimately self-resolved; patient reports that this episode feels more severe and the HI towards her daughter was not something that she felt during last episode, with this symptoms.     Although initially somewhat resistant to concept of psychiatric treatment, patient ultimately in agreement with initiating treatment and with inpatient psychiatric care. Patient declined for interviewer to speak with  or family member, patient reports that  is currently working 2 jobs, mother-in-law is currently taking care of children. Patient states that she does not want to trouble family members with discussion and feels guilt and shame discussing mental health matters with her family members. Did consent for interviewer to speak with best friend, Nodeline, [phone]. Call placed, but did not receive answer, voicemail left.

## 2022-11-03 NOTE — BH CONSULTATION LIAISON ASSESSMENT NOTE - OTHER PAST PSYCHIATRIC HISTORY (INCLUDE DETAILS REGARDING ONSET, COURSE OF ILLNESS, INPATIENT/OUTPATIENT TREATMENT)
reports previous episode of undiagnosed depression following delivery of her first child; this episode self-resolved

## 2022-11-04 VITALS
RESPIRATION RATE: 17 BRPM | DIASTOLIC BLOOD PRESSURE: 83 MMHG | HEART RATE: 90 BPM | SYSTOLIC BLOOD PRESSURE: 134 MMHG | OXYGEN SATURATION: 97 % | TEMPERATURE: 98 F

## 2022-11-04 LAB
ALBUMIN SERPL ELPH-MCNC: 3.9 G/DL — SIGNIFICANT CHANGE UP (ref 3.3–5.2)
ALP SERPL-CCNC: 83 U/L — SIGNIFICANT CHANGE UP (ref 40–120)
ALT FLD-CCNC: 9 U/L — SIGNIFICANT CHANGE UP
ANION GAP SERPL CALC-SCNC: 13 MMOL/L — SIGNIFICANT CHANGE UP (ref 5–17)
AST SERPL-CCNC: 12 U/L — SIGNIFICANT CHANGE UP
BASOPHILS # BLD AUTO: 0.05 K/UL — SIGNIFICANT CHANGE UP (ref 0–0.2)
BASOPHILS NFR BLD AUTO: 0.6 % — SIGNIFICANT CHANGE UP (ref 0–2)
BILIRUB SERPL-MCNC: 0.5 MG/DL — SIGNIFICANT CHANGE UP (ref 0.4–2)
BUN SERPL-MCNC: 11.8 MG/DL — SIGNIFICANT CHANGE UP (ref 8–20)
CALCIUM SERPL-MCNC: 8.6 MG/DL — SIGNIFICANT CHANGE UP (ref 8.4–10.5)
CHLORIDE SERPL-SCNC: 106 MMOL/L — SIGNIFICANT CHANGE UP (ref 96–108)
CO2 SERPL-SCNC: 25 MMOL/L — SIGNIFICANT CHANGE UP (ref 22–29)
CREAT SERPL-MCNC: 0.59 MG/DL — SIGNIFICANT CHANGE UP (ref 0.5–1.3)
EGFR: 119 ML/MIN/1.73M2 — SIGNIFICANT CHANGE UP
EOSINOPHIL # BLD AUTO: 0.13 K/UL — SIGNIFICANT CHANGE UP (ref 0–0.5)
EOSINOPHIL NFR BLD AUTO: 1.6 % — SIGNIFICANT CHANGE UP (ref 0–6)
GLUCOSE SERPL-MCNC: 107 MG/DL — HIGH (ref 70–99)
HCT VFR BLD CALC: 44.6 % — SIGNIFICANT CHANGE UP (ref 34.5–45)
HGB BLD-MCNC: 14 G/DL — SIGNIFICANT CHANGE UP (ref 11.5–15.5)
IMM GRANULOCYTES NFR BLD AUTO: 0.1 % — SIGNIFICANT CHANGE UP (ref 0–0.9)
LYMPHOCYTES # BLD AUTO: 2.31 K/UL — SIGNIFICANT CHANGE UP (ref 1–3.3)
LYMPHOCYTES # BLD AUTO: 28 % — SIGNIFICANT CHANGE UP (ref 13–44)
MCHC RBC-ENTMCNC: 27.5 PG — SIGNIFICANT CHANGE UP (ref 27–34)
MCHC RBC-ENTMCNC: 31.4 GM/DL — LOW (ref 32–36)
MCV RBC AUTO: 87.5 FL — SIGNIFICANT CHANGE UP (ref 80–100)
MONOCYTES # BLD AUTO: 0.43 K/UL — SIGNIFICANT CHANGE UP (ref 0–0.9)
MONOCYTES NFR BLD AUTO: 5.2 % — SIGNIFICANT CHANGE UP (ref 2–14)
NEUTROPHILS # BLD AUTO: 5.33 K/UL — SIGNIFICANT CHANGE UP (ref 1.8–7.4)
NEUTROPHILS NFR BLD AUTO: 64.5 % — SIGNIFICANT CHANGE UP (ref 43–77)
PLATELET # BLD AUTO: 401 K/UL — HIGH (ref 150–400)
POTASSIUM SERPL-MCNC: 4.4 MMOL/L — SIGNIFICANT CHANGE UP (ref 3.5–5.3)
POTASSIUM SERPL-SCNC: 4.4 MMOL/L — SIGNIFICANT CHANGE UP (ref 3.5–5.3)
PROT SERPL-MCNC: 7.1 G/DL — SIGNIFICANT CHANGE UP (ref 6.6–8.7)
RBC # BLD: 5.1 M/UL — SIGNIFICANT CHANGE UP (ref 3.8–5.2)
RBC # FLD: 12.6 % — SIGNIFICANT CHANGE UP (ref 10.3–14.5)
SODIUM SERPL-SCNC: 144 MMOL/L — SIGNIFICANT CHANGE UP (ref 135–145)
WBC # BLD: 8.26 K/UL — SIGNIFICANT CHANGE UP (ref 3.8–10.5)
WBC # FLD AUTO: 8.26 K/UL — SIGNIFICANT CHANGE UP (ref 3.8–10.5)

## 2022-11-04 PROCEDURE — U0003: CPT

## 2022-11-04 PROCEDURE — 81001 URINALYSIS AUTO W/SCOPE: CPT

## 2022-11-04 PROCEDURE — 99231 SBSQ HOSP IP/OBS SF/LOW 25: CPT

## 2022-11-04 PROCEDURE — 85730 THROMBOPLASTIN TIME PARTIAL: CPT

## 2022-11-04 PROCEDURE — 84550 ASSAY OF BLOOD/URIC ACID: CPT

## 2022-11-04 PROCEDURE — 80053 COMPREHEN METABOLIC PANEL: CPT

## 2022-11-04 PROCEDURE — 85384 FIBRINOGEN ACTIVITY: CPT

## 2022-11-04 PROCEDURE — 93010 ELECTROCARDIOGRAM REPORT: CPT

## 2022-11-04 PROCEDURE — 83735 ASSAY OF MAGNESIUM: CPT

## 2022-11-04 PROCEDURE — 99233 SBSQ HOSP IP/OBS HIGH 50: CPT

## 2022-11-04 PROCEDURE — 83615 LACTATE (LD) (LDH) ENZYME: CPT

## 2022-11-04 PROCEDURE — 93005 ELECTROCARDIOGRAM TRACING: CPT

## 2022-11-04 PROCEDURE — 36415 COLL VENOUS BLD VENIPUNCTURE: CPT

## 2022-11-04 PROCEDURE — 85610 PROTHROMBIN TIME: CPT

## 2022-11-04 PROCEDURE — U0005: CPT

## 2022-11-04 PROCEDURE — 86769 SARS-COV-2 COVID-19 ANTIBODY: CPT

## 2022-11-04 PROCEDURE — 85025 COMPLETE CBC W/AUTO DIFF WBC: CPT

## 2022-11-04 RX ORDER — LANOLIN ALCOHOL/MO/W.PET/CERES
1 CREAM (GRAM) TOPICAL
Qty: 0 | Refills: 0 | DISCHARGE
Start: 2022-11-04

## 2022-11-04 RX ORDER — SERTRALINE 25 MG/1
1 TABLET, FILM COATED ORAL
Qty: 30 | Refills: 3
Start: 2022-11-04 | End: 2023-03-03

## 2022-11-04 RX ORDER — NIFEDIPINE 30 MG
1 TABLET, EXTENDED RELEASE 24 HR ORAL
Qty: 30 | Refills: 2
Start: 2022-11-04 | End: 2023-02-01

## 2022-11-04 RX ORDER — MIRTAZAPINE 45 MG/1
1 TABLET, ORALLY DISINTEGRATING ORAL
Qty: 0 | Refills: 0 | DISCHARGE
Start: 2022-11-04

## 2022-11-04 RX ADMIN — Medication 60 MILLIGRAM(S): at 06:01

## 2022-11-04 RX ADMIN — SERTRALINE 25 MILLIGRAM(S): 25 TABLET, FILM COATED ORAL at 12:09

## 2022-11-04 NOTE — CHART NOTE - NSCHARTNOTEFT_GEN_A_CORE
Patient seen at bedside  Patient reports improved symptoms since admission. Denies any headaches, visual changes or epigastric pain.  Reports overall improvement in baseline depressive symptoms since talking to behavioral health team.  Plan for inpatient psychiatric transfer to [facility] discussed with patient. Patient to continue with procardia 60mg on discharge  Will follow up with Dr. Rojas after discharge from [facility] for routine BPs assessment/postpartum visit  All questions answered    Pt seen and plan d/w Dr. Booker

## 2022-11-04 NOTE — BH CONSULTATION LIAISON PROGRESS NOTE - CURRENT MEDICATION
MEDICATIONS  (STANDING):  lactated ringers. 1000 milliLiter(s) (75 mL/Hr) IV Continuous <Continuous>  melatonin 3 milliGRAM(s) Oral at bedtime  NIFEdipine XL 60 milliGRAM(s) Oral every 24 hours  sertraline 25 milliGRAM(s) Oral daily    MEDICATIONS  (PRN):  acetaminophen     Tablet .. 975 milliGRAM(s) Oral every 6 hours PRN Mild Pain (1 - 3), Moderate Pain (4 - 6), Severe Pain (7 - 10)  ibuprofen  Tablet. 600 milliGRAM(s) Oral every 6 hours PRN Mild Pain (1 - 3), Moderate Pain (4 - 6), Severe Pain (7 - 10)  mirtazapine 7.5 milliGRAM(s) Oral at bedtime PRN insomnia

## 2022-11-04 NOTE — BH CONSULTATION LIAISON PROGRESS NOTE - OTHER
improved, more neutral to euthymic, no longer tearful/crying no SI/HI "better," "rested"; denies depressed mood on today's encounter organized, coherent improved, broader range of affect today with patient smiling at points

## 2022-11-04 NOTE — PROGRESS NOTE ADULT - SUBJECTIVE AND OBJECTIVE BOX
SURINDER ORTIZ is a 35yo  PPD#20 s/p  @38w4d after IOL for GDMA-2 (10/15/22) readmitted for postpartum preeclampsia due to severe range BPs requiring Hydralazing 5/10/10 and MgSO4, as well as postpartum depression vs. psychosis.     PAST 24H:  - BPs overnight: 112-136/68-80  - S/p MgSO4 24h MgSO4      SUBJECTIVE:  Patient seen at bedside this AM.  Denies any headache, visual changes or epigastric pain.  Was able to rest comfortably overnight.  Feels well comforted after seeing psychiatry team yesterday. Denies any acute thoughts of self-harm  Tolerating regular diet. Ambulating. Voiding.  No additional complaints      Vital Signs Last 24 Hrs  T(C): 37.4 (2022 04:44), Max: 37.4 (2022 00:24)  T(F): 99.3 (2022 04:44), Max: 99.4 (2022 00:24)  HR: 93 (2022 04:44) (81 - 106)  BP: 136/79 (2022 04:44) (112/68 - 160/84)  RR: 17 (2022 04:44) (16 - 17)  SpO2: 97% (2022 04:44) (86% - 100%)        Physical Exam:  General: Adult female in NAD  CVS: RRR  Lungs: CTAB  Breasts: Nontender, non-engorged  Abdomen: soft, non-tender  Pelvic: Deferred  Ext: No cyanosis, edema or calf tenderness  Skin: No rashes or lesions on exposed skin  Neuro: Normal DTRs, grossly intact    Labs:                          14.0   8.26  )-----------( 401      ( 2022 05:00 )             44.6     11-    144  |  103  |  9.8  ----------------------------<  111<H>  3.7   |  28.0  |  0.64    Ca    7.3<L>      2022 06:50  Mg     6.3     11-03    TPro  7.2  /  Alb  3.8  /  TBili  0.4  /  DBili  x   /  AST  10  /  ALT  11  /  AlkPhos  87  11-03    PT/INR - ( 2022 12:41 )   PT: 12.8 sec;   INR: 1.10 ratio         PTT - ( 2022 12:41 )  PTT:32.4 sec        MEDICATIONS  (STANDING):  cefTRIAXone Injectable. 1000 milliGRAM(s) IV Push every 24 hours  cefTRIAXone Injectable.      lactated ringers. 1000 milliLiter(s) (75 mL/Hr) IV Continuous <Continuous>  magnesium sulfate Infusion 2 Gm/Hr (50 mL/Hr) IV Continuous <Continuous>  melatonin 3 milliGRAM(s) Oral at bedtime  NIFEdipine XL 60 milliGRAM(s) Oral every 24 hours  sertraline 25 milliGRAM(s) Oral daily    MEDICATIONS  (PRN):  acetaminophen     Tablet .. 975 milliGRAM(s) Oral every 6 hours PRN Mild Pain (1 - 3), Moderate Pain (4 - 6), Severe Pain (7 - 10)  ibuprofen  Tablet. 600 milliGRAM(s) Oral every 6 hours PRN Mild Pain (1 - 3), Moderate Pain (4 - 6), Severe Pain (7 - 10)  mirtazapine 7.5 milliGRAM(s) Oral at bedtime PRN insomnia

## 2022-11-04 NOTE — PROGRESS NOTE ADULT - PROBLEM SELECTOR PLAN 2
- Reports improved symptoms after initiation of IV antibiotics  - Continue with Rocephin 1g (11/3->) . UCX pending

## 2022-11-04 NOTE — BH CONSULTATION LIAISON PROGRESS NOTE - NSBHCHARTREVIEWVS_PSY_A_CORE FT
Vital Signs Last 24 Hrs  T(C): 37.2 (04 Nov 2022 08:12), Max: 37.4 (04 Nov 2022 00:24)  T(F): 98.9 (04 Nov 2022 08:12), Max: 99.4 (04 Nov 2022 00:24)  HR: 90 (04 Nov 2022 08:12) (82 - 106)  BP: 147/86 (04 Nov 2022 08:12) (112/68 - 147/86)  BP(mean): --  RR: 17 (04 Nov 2022 08:12) (16 - 17)  SpO2: 96% (04 Nov 2022 08:12) (96% - 100%)    Parameters below as of 04 Nov 2022 08:12  Patient On (Oxygen Delivery Method): room air

## 2022-11-04 NOTE — PROGRESS NOTE ADULT - ASSESSMENT
SURINDER ORTIZ is a 37yo  PPD#20 s/p  @38w4d after IOL for GDMA-2 (10/15/22) readmitted for postpartum preeclampsia due to severe range BPs requiring Hydralazing 5/10/10 and MgSO4, as well as postpartum depression vs. psychosis.   
37 yo  PP 3 weeks after , readmitted  for pre-eclampsia with severe features and now stable off magnesium on Procardia 60 mg XL. SHe is medically cleared for transfer for treatment of her postpartum depression and concern re harm to the baby.
SURINDER ORTIZ is a 35yo  PPD#19 s/p  @38w4d after IOL for GDMA-2 (10/15/22) readmitted for postpartum preeclampsia due to severe range BPs requiring Hydralazing 5/10/10 and MgSO4, as well as postpartum depression vs. psychosis

## 2022-11-04 NOTE — PROGRESS NOTE ADULT - SUBJECTIVE AND OBJECTIVE BOX
Please note that patient completed her course of magnesium yesterday at 12:30 PM and remains stable with no s/s magnesium toxicity. She completed IV fluid therapy at that time and not longer requires IV hydration.

## 2022-11-04 NOTE — BH CONSULTATION LIAISON PROGRESS NOTE - NSBHFUPINTERVALHXFT_PSY_A_CORE
Patient seen and evaluated. On encounter, patient's affect is improved, no longer tearful, she is more reactive and smiling at points. Patient reports that she slept well with Melatonin 3 mg HS PRN last night which has helped her to feel like she "had a restart." Patient also describes feeling an improvement in her mood, which she attributes to receiving help and starting medication. Denies any side effects to medication (Zoloft) so far, tolerating med well. Patient also denies SI/HI/AH/VH, no paranoia or delusional content elicited. Denies further intrusive thoughts of wanting to harm her infant; states that she misses her children. Patient agreeable with plan to continue medication treatment and pursue inpatient hospitalization for acute stabilization and continued med management/monitoring.

## 2022-11-04 NOTE — PROGRESS NOTE ADULT - ATTENDING COMMENTS
35yo  PPD#20 s/p  @38w4d after IOL for GDMA-2 (10/15/22) readmitted for postpartum preeclampsia due to severe range BPs now s/p magnesium well controlled on Procardia XL 60 mg, medically cleared for transfer to psych for further treatment of her suicidal ideation.
She has postpartum preeclampsia and depression. Continue with standard preeclampsia treatment. Adjust antihypertensive medication. Psychiatry evaluation today.

## 2022-11-04 NOTE — PROGRESS NOTE ADULT - PROBLEM SELECTOR PLAN 4
- History of postpartum depression with prior pregnancy.  Reports depressive symptoms at bedside  - Seen by behavioral health team on 11/3, DDX MDD w/ peripartum onet  - Recommendations: -START Zoloft 25 mg HS. Rx for MDD. For insomnia: Melatonin 3 mg HS PRN (1st line); Remeron 7.5 mg HS PRN (2nd line if Melatonin ineffective). 2PC admission for inpatient psychiatric hospitalization  -Does not need 1:1 observation at this time  - Plan for transfer to inpatient unit once medically cleared
- DVT prophylaxis: SCDs in bed  - Diet: Regular

## 2022-11-04 NOTE — BH CONSULTATION LIAISON PROGRESS NOTE - NSBHASSESSMENTFT_PSY_ALL_CORE
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 Patient initially sent from outpatient office to hospital for elevated BP, proteinuria, as well as depressive symptoms and reported HI towards . Patient currently admitted in Labor and Delivery dept. Psychiatry consulted for postpartum depression vs psychosis and homicidal ideations.    Assessment: Patient presentation with depressed mood, tearfulness, passive SI, HI towards , is most consistent with MDD with peripartum onset. Does not appear to have psychotic component to symptoms; patient is organized, coherent, no observed or reported difficulties with thought process, HI described intrusive, ego-dystonic thoughts NOT as AH or command type hallucinations, no delusional thought content elicited.     Untreated depression poses risks to the mother and infant, such as nonadherence with  care, poor self-care, neglect of the infant (and other children), disrupted maternal-infant bonding, family dysfunction, child abuse, and suicidal/homicidal behavior; also, complications of depression may ensue, including psychotic features, catatonia, and comorbid substance use disorders. Given such risks and potential sequelae for patient and her children should symptoms persist, prompt treatment is needed to address these symptoms. Recommend initiating Zoloft 25 mg daily; amongst, antidepressant therapies in post-partum period Zoloft has the most supporting evidence for efficacy and safety in the literature. On a further note, patient is not breastfeeding, however, if she were or should she, Zoloft is also the most researched and supported amongst antidepressant treatment in breastfeeding mother with little to no transmission in breast milk and no reported associated effects of such transmission reported in literature.     Furthermore, given acute safety risk to patient and her  infant, patient also meets criteria for involuntary inpatient hospitalization for acute stabilization of psychiatric symptoms and mitigation of safety risk. Does not need 1:1 observation at this time given lack of active SI, may benefit from a camera room if possible. no history of self-harm.       ; patient appears improved on encounter. Denies SI or HI as well as intrusive thoughts today. Patient attributes improvement in mood to improved rest with Melatonin as well as initiating anti-depressant therapy, which she is tolerating well. Improved sleep and sleep/wake cycle is very important for treatment and remission of affective symptoms/disorders; recommend continuing to offer PRN support for insomnia. After single dose of anti-depressant extremely unlikely for this to already be providing mood benefits, as it typically takes 4-6 weeks of SSRI at therapeutic dose for effective response; that being said, initial improvements may be seen in light of potential placebo effect in initiating treatment as well as response to supportive therapy, rest, and sharing her symptomatology. Despite these initial improvements, patient still meets criteria and would benefit from inpatient hospitalization for acute stabilization and continued med management/monitoring, which patient is agreeable to.    Ddx:  -MDD with peripartum onset    Plan:  -Continue Zoloft 25 mg HS. Rx for MDD.  -For insomnia: Melatonin 3 mg HS PRN (1st line); Remeron 7.5 mg HS PRN (2nd line if Melatonin ineffective)  -2PC admission for inpatient psychiatric hospitalization  -Does not need 1:1 observation at this time, may benefit from camera room

## 2022-11-04 NOTE — PROGRESS NOTE ADULT - PROBLEM SELECTOR PLAN 3
- DVT prophylaxis: SCDs in bed  - Diet: Regular.
- Pain with urination and suprapubic discomfort  - Continue with Rocephin 1g (11/3->) . UCX pending  - Reports improved symptoms

## 2022-11-04 NOTE — PROGRESS NOTE ADULT - PROBLEM SELECTOR PLAN 1
- Diagnosis based on severe range BPs requiring anti-hypertensives, s/p MgSO4 x 24h for seizure prophylaxis   - BPs overnight: 112-136/68-80, on standing procardia 60mg with hold parameters. Uptitrate as needed  - Ibuprofen and tylenol for pain control  - Cardio Obstetrics program at time of discharge.

## 2022-11-09 ENCOUNTER — APPOINTMENT (OUTPATIENT)
Dept: OBGYN | Facility: CLINIC | Age: 37
End: 2022-11-09

## 2022-11-21 ENCOUNTER — APPOINTMENT (OUTPATIENT)
Dept: OBGYN | Facility: CLINIC | Age: 37
End: 2022-11-21

## 2022-11-21 VITALS
DIASTOLIC BLOOD PRESSURE: 89 MMHG | WEIGHT: 151 LBS | SYSTOLIC BLOOD PRESSURE: 136 MMHG | BODY MASS INDEX: 25.78 KG/M2 | HEIGHT: 64 IN

## 2022-11-21 PROCEDURE — 99214 OFFICE O/P EST MOD 30 MIN: CPT

## 2022-11-21 RX ORDER — LABETALOL HYDROCHLORIDE 200 MG/1
200 TABLET, FILM COATED ORAL
Qty: 120 | Refills: 3 | Status: ACTIVE | COMMUNITY
Start: 2022-11-21 | End: 1900-01-01

## 2022-11-21 RX ORDER — SERTRALINE HYDROCHLORIDE 50 MG/1
50 TABLET, FILM COATED ORAL
Qty: 30 | Refills: 5 | Status: ACTIVE | COMMUNITY
Start: 2022-11-21 | End: 1900-01-01

## 2022-11-21 NOTE — OB RN PATIENT PROFILE - NSPRENATALGBS_OBGYN_ALL_OB_START_DATE
The medication was cued up for the provider to review and sign.      Message set to the patient that medication will be sent to her pharmacy as requested.     20-Sep-2022

## 2022-11-29 ENCOUNTER — NON-APPOINTMENT (OUTPATIENT)
Age: 37
End: 2022-11-29

## 2022-12-01 LAB — CYTOLOGY CVX/VAG DOC THIN PREP: NORMAL

## 2022-12-15 NOTE — PATIENT PROFILE OB - PAIN SCALE PREFERRED, PROFILE
Diagnostic mammogram placed for pt.
Render Dears with Radiology requested a diagnostiic bilateral mammogram.
numerical 0-10

## 2022-12-19 ENCOUNTER — APPOINTMENT (OUTPATIENT)
Dept: OBGYN | Facility: CLINIC | Age: 37
End: 2022-12-19

## 2023-02-09 ENCOUNTER — NON-APPOINTMENT (OUTPATIENT)
Age: 38
End: 2023-02-09

## 2023-04-11 NOTE — REVIEW OF SYSTEMS
[Alert] : alert [No Acute Distress] : in no acute distress [Normal] : normal skin color and pigmentation [No Focal Deficits] : no focal deficits [Oriented To Time, Place, And Person] : oriented to person, place, and time [de-identified] : depressed affect [Negative] : Heme/Lymph

## 2023-12-11 NOTE — ED STATDOCS - PHYSICAL EXAMINATION
Const: Awake, alert and oriented. In no acute distress. Well appearing.  HEENT: NC/AT. Moist mucous membranes.  Eyes: No scleral icterus. EOMI.  Neck:. Soft and supple. Full ROM without pain.  Cardiac: Tachycardic rate and regular rhythm. +S1/S2. No murmurs. Peripheral pulses 2+ and symmetric. No LE edema.  Resp: Speaking in full sentences. No evidence of respiratory distress. No wheezes, rales or rhonchi.  Abd: Soft, non-tender, non-distended. Normal bowel sounds in all 4 quadrants. No guarding or rebound.  Back: Spine midline and non-tender. No CVAT.  Skin: No rashes, abrasions or lacerations.  Neuro: Awake, alert & oriented x 3. Moves all extremities symmetrically. none

## 2023-12-24 ENCOUNTER — NON-APPOINTMENT (OUTPATIENT)
Age: 38
End: 2023-12-24

## 2024-01-25 ENCOUNTER — EMERGENCY (EMERGENCY)
Facility: HOSPITAL | Age: 39
LOS: 1 days | Discharge: LEFT BEFORE DISCHARGE | End: 2024-01-25
Attending: EMERGENCY MEDICINE
Payer: COMMERCIAL

## 2024-01-25 VITALS
SYSTOLIC BLOOD PRESSURE: 145 MMHG | TEMPERATURE: 99 F | RESPIRATION RATE: 18 BRPM | OXYGEN SATURATION: 97 % | WEIGHT: 134.48 LBS | DIASTOLIC BLOOD PRESSURE: 86 MMHG | HEIGHT: 64 IN | HEART RATE: 79 BPM

## 2024-01-25 LAB
ALBUMIN SERPL ELPH-MCNC: 3.8 G/DL — SIGNIFICANT CHANGE UP (ref 3.3–5.2)
ALP SERPL-CCNC: 51 U/L — SIGNIFICANT CHANGE UP (ref 40–120)
ALT FLD-CCNC: 8 U/L — SIGNIFICANT CHANGE UP
ANION GAP SERPL CALC-SCNC: 11 MMOL/L — SIGNIFICANT CHANGE UP (ref 5–17)
AST SERPL-CCNC: 15 U/L — SIGNIFICANT CHANGE UP
BASOPHILS # BLD AUTO: 0.07 K/UL — SIGNIFICANT CHANGE UP (ref 0–0.2)
BASOPHILS NFR BLD AUTO: 0.8 % — SIGNIFICANT CHANGE UP (ref 0–2)
BILIRUB SERPL-MCNC: 0.6 MG/DL — SIGNIFICANT CHANGE UP (ref 0.4–2)
BUN SERPL-MCNC: 13.8 MG/DL — SIGNIFICANT CHANGE UP (ref 8–20)
CALCIUM SERPL-MCNC: 8.8 MG/DL — SIGNIFICANT CHANGE UP (ref 8.4–10.5)
CHLORIDE SERPL-SCNC: 102 MMOL/L — SIGNIFICANT CHANGE UP (ref 96–108)
CO2 SERPL-SCNC: 25 MMOL/L — SIGNIFICANT CHANGE UP (ref 22–29)
CREAT SERPL-MCNC: 0.81 MG/DL — SIGNIFICANT CHANGE UP (ref 0.5–1.3)
EGFR: 95 ML/MIN/1.73M2 — SIGNIFICANT CHANGE UP
EOSINOPHIL # BLD AUTO: 0.16 K/UL — SIGNIFICANT CHANGE UP (ref 0–0.5)
EOSINOPHIL NFR BLD AUTO: 1.8 % — SIGNIFICANT CHANGE UP (ref 0–6)
GLUCOSE SERPL-MCNC: 87 MG/DL — SIGNIFICANT CHANGE UP (ref 70–99)
HCG SERPL-ACNC: <4 MIU/ML — SIGNIFICANT CHANGE UP
HCT VFR BLD CALC: 36.9 % — SIGNIFICANT CHANGE UP (ref 34.5–45)
HGB BLD-MCNC: 11.9 G/DL — SIGNIFICANT CHANGE UP (ref 11.5–15.5)
HIV 1 & 2 AB SERPL IA.RAPID: SIGNIFICANT CHANGE UP
IMM GRANULOCYTES NFR BLD AUTO: 0.2 % — SIGNIFICANT CHANGE UP (ref 0–0.9)
LYMPHOCYTES # BLD AUTO: 3.16 K/UL — SIGNIFICANT CHANGE UP (ref 1–3.3)
LYMPHOCYTES # BLD AUTO: 36.3 % — SIGNIFICANT CHANGE UP (ref 13–44)
MCHC RBC-ENTMCNC: 27.9 PG — SIGNIFICANT CHANGE UP (ref 27–34)
MCHC RBC-ENTMCNC: 32.2 GM/DL — SIGNIFICANT CHANGE UP (ref 32–36)
MCV RBC AUTO: 86.4 FL — SIGNIFICANT CHANGE UP (ref 80–100)
MONOCYTES # BLD AUTO: 0.46 K/UL — SIGNIFICANT CHANGE UP (ref 0–0.9)
MONOCYTES NFR BLD AUTO: 5.3 % — SIGNIFICANT CHANGE UP (ref 2–14)
NEUTROPHILS # BLD AUTO: 4.84 K/UL — SIGNIFICANT CHANGE UP (ref 1.8–7.4)
NEUTROPHILS NFR BLD AUTO: 55.6 % — SIGNIFICANT CHANGE UP (ref 43–77)
PLATELET # BLD AUTO: 328 K/UL — SIGNIFICANT CHANGE UP (ref 150–400)
POTASSIUM SERPL-MCNC: 4.1 MMOL/L — SIGNIFICANT CHANGE UP (ref 3.5–5.3)
POTASSIUM SERPL-SCNC: 4.1 MMOL/L — SIGNIFICANT CHANGE UP (ref 3.5–5.3)
PROT SERPL-MCNC: 6.7 G/DL — SIGNIFICANT CHANGE UP (ref 6.6–8.7)
RBC # BLD: 4.27 M/UL — SIGNIFICANT CHANGE UP (ref 3.8–5.2)
RBC # FLD: 12.6 % — SIGNIFICANT CHANGE UP (ref 10.3–14.5)
SODIUM SERPL-SCNC: 138 MMOL/L — SIGNIFICANT CHANGE UP (ref 135–145)
TROPONIN T, HIGH SENSITIVITY RESULT: 10 NG/L — SIGNIFICANT CHANGE UP (ref 0–51)
WBC # BLD: 8.71 K/UL — SIGNIFICANT CHANGE UP (ref 3.8–10.5)
WBC # FLD AUTO: 8.71 K/UL — SIGNIFICANT CHANGE UP (ref 3.8–10.5)

## 2024-01-25 PROCEDURE — 93005 ELECTROCARDIOGRAM TRACING: CPT

## 2024-01-25 PROCEDURE — 99285 EMERGENCY DEPT VISIT HI MDM: CPT | Mod: 25

## 2024-01-25 PROCEDURE — 84484 ASSAY OF TROPONIN QUANT: CPT

## 2024-01-25 PROCEDURE — 84702 CHORIONIC GONADOTROPIN TEST: CPT

## 2024-01-25 PROCEDURE — 85025 COMPLETE CBC W/AUTO DIFF WBC: CPT

## 2024-01-25 PROCEDURE — 99285 EMERGENCY DEPT VISIT HI MDM: CPT

## 2024-01-25 PROCEDURE — 36415 COLL VENOUS BLD VENIPUNCTURE: CPT

## 2024-01-25 PROCEDURE — 86703 HIV-1/HIV-2 1 RESULT ANTBDY: CPT

## 2024-01-25 PROCEDURE — 80053 COMPREHEN METABOLIC PANEL: CPT

## 2024-01-25 PROCEDURE — 71046 X-RAY EXAM CHEST 2 VIEWS: CPT

## 2024-01-25 PROCEDURE — 93010 ELECTROCARDIOGRAM REPORT: CPT

## 2024-01-25 PROCEDURE — 71046 X-RAY EXAM CHEST 2 VIEWS: CPT | Mod: 26

## 2024-01-25 NOTE — ED ADULT TRIAGE NOTE - CHIEF COMPLAINT QUOTE
pt c/o pain to left chest area, every few minutes pain starts, started today  denies SOB  A&Ox3, resp wnl

## 2024-01-25 NOTE — ED PROVIDER NOTE - ATTENDING APP SHARED VISIT CONTRIBUTION OF CARE
I, Giovani Storey MD, performed the initial face to face bedside interview with this patient regarding history of present illness, review of symptoms and relevant past medical, social and family history.  I completed an independent physical examination.  I was the initial provider who evaluated this patient. I have signed out the follow up of any pending tests (i.e. labs, radiological studies) to the ACP.  I have communicated the patient’s plan of care and disposition with the ACP.

## 2024-01-25 NOTE — ED ADULT NURSE NOTE - OBJECTIVE STATEMENT
pt presents to ED in NAD c/o intermittent left sided chest pain x1 day. pt denies N/V, SOB, fever, chills, lightheadedness, and dizziness. pt breathing even and unlabored at this time.

## 2024-01-25 NOTE — ED PROVIDER NOTE - NSFOLLOWUPINSTRUCTIONS_ED_ALL_ED_FT
- Please follow-up with your primary care doctor in the next 1-2 days.  Please call tomorrow for an appointment.  If you cannot follow-up with your primary care doctor please return to the ED for any urgent issues.  - You were given a copy of the tests performed today.  Please bring the results with you and review them with your primary care doctor.  - If you have any worsening of symptoms or any other concerns please return to the ED immediately.  - Please continue taking your home medications as directed.     Chest Pain    Chest pain can be caused by many different conditions which may or may not be dangerous. Causes include heartburn, lung infections, heart attack, blood clot in lungs, skin infections, strain or damage to muscle, cartilage, or bones, etc. In addition to a history and physical examination, an electrocardiogram (ECG) or other lab tests may have been performed to determine the cause of your chest pain. Follow up with your primary care provider or with a cardiologist as instructed.     SEEK IMMEDIATE MEDICAL CARE IF YOU HAVE ANY OF THE FOLLOWING SYMPTOMS: worsening chest pain, coughing up blood, unexplained back/neck/jaw pain, severe abdominal pain, dizziness or lightheadedness, fainting, shortness of breath, sweaty or clammy skin, vomiting, or racing heart beat. These symptoms may represent a serious problem that is an emergency. Do not wait to see if the symptoms will go away. Get medical help right away. Call 911 and do not drive yourself to the hospital.

## 2024-01-25 NOTE — ED PROVIDER NOTE - CLINICAL SUMMARY MEDICAL DECISION MAKING FREE TEXT BOX
labs, ECG and diagnostic imaging results reviewed with patient labs, ECG and diagnostic imaging results reviewed with patient    pain likely MSK in nature. labs unremarkable. ekg without evidence of ischemic changes. strict return precautions explained. labs, ECG and diagnostic imaging results reviewed with patient    pain likely MSK in nature. labs unremarkable. ekg without evidence of ischemic changes.

## 2024-01-25 NOTE — ED PROVIDER NOTE - PROGRESS NOTE DETAILS
Pt reassessed, pt feeling better at this time, vss, pt able to walk, talk and vocalized plan of action. Discussed in depth and explained to pt in depth the next steps that need to be taking including proper follow up with PCP or specialists. All incidental findings were discussed with pt as well. Pt verbalized their concerns and all questions were answered. Pt understands dispo and wants discharge. Given good instructions when to return to ED, strict return precautions and importance of f/u. PT evaluated by intake physician. HPI/PE/ROS as noted above. Will follow up plan per intake physician eloped  prior to results being explained, still pending d-dimer labs acknowledged, pending d-dimer

## 2024-01-25 NOTE — ED PROVIDER NOTE - CARE PROVIDER_API CALL
German Castillo  Cardiovascular Disease  [address] [facility], [address] [address]  Phone: [phone]  Fax: [phone]  Follow Up Time:

## 2024-01-25 NOTE — ED ADULT NURSE NOTE - NSFALLUNIVINTERV_ED_ALL_ED
Bed/Stretcher in lowest position, wheels locked, appropriate side rails in place/Call bell, personal items and telephone in reach/Instruct patient to call for assistance before getting out of bed/chair/stretcher/Non-slip footwear applied when patient is off stretcher/[place] to call system/Physically safe environment - no spills, clutter or unnecessary equipment/Purposeful proactive rounding/Room/bathroom lighting operational, light cord in reach

## 2024-01-25 NOTE — ED PROVIDER NOTE - PATIENT PORTAL LINK FT
You can access the FollowMyHealth Patient Portal offered by [facility] by registering at the following website: [email or web address]. By joining [employer]’s FollowMyHealth portal, you will also be able to view your health information using other applications (apps) compatible with our system.

## 2024-02-15 NOTE — OB RN PATIENT PROFILE - URINARY CATHETER
- BP elevated today 167/68  - Patient asymptomatic  - Rx for home BP cuff provided today  - Instructed patient to keep a daily home BP log to bring to next visit, log provided  - Recommend low salt diet and cutting down on caffeine intake to help with BP control  - Follow up in 6-8weeks for BP check     no

## 2024-03-05 NOTE — OB PROVIDER TRIAGE NOTE - LIVING CHILDREN, OB PROFILE
1
A percutaneous stick to the left femoral artery was performed. Ultrasound guidance was used to obtain access.
Pacu home

## 2024-06-12 NOTE — OB RN DELIVERY SUMMARY - BABY A: WEIGHT IN OUNCES (FROM GRAMS), DELIVERY
Quality 130: Documentation Of Current Medications In The Medical Record: Current Medications Documented Quality 431: Preventive Care And Screening: Unhealthy Alcohol Use - Screening: Patient not identified as an unhealthy alcohol user when screened for unhealthy alcohol use using a systematic screening method Detail Level: Generalized Quality 226: Preventive Care And Screening: Tobacco Use: Screening And Cessation Intervention: Patient screened for tobacco use and is an ex/non-smoker 13

## 2024-10-29 NOTE — OB PROVIDER TRIAGE NOTE - NS_RISKASSESSMENT_OBGYN_ALL_OB
CSA was signed 10/15/2024.  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  He uses oxycodone 5 mg tablet as needed for pain.  Some days he does not require the medication at all and some days he requires up to 2 tabs.    PDMP was reviewed today.  There is been no misuse or abuse of the medication.  I am going to refill his medication today with 30 tabs, previously he was receiving 10 tabs which was not lasting him more than just a few days.   Yes, with Social Assessment

## 2024-12-11 ENCOUNTER — EMERGENCY (EMERGENCY)
Facility: HOSPITAL | Age: 39
LOS: 1 days | Discharge: HOME | End: 2024-12-11
Attending: EMERGENCY MEDICINE
Payer: COMMERCIAL

## 2024-12-11 VITALS
HEART RATE: 77 BPM | HEIGHT: 64 IN | TEMPERATURE: 98 F | OXYGEN SATURATION: 99 % | WEIGHT: 145.06 LBS | RESPIRATION RATE: 17 BRPM | DIASTOLIC BLOOD PRESSURE: 84 MMHG | SYSTOLIC BLOOD PRESSURE: 144 MMHG

## 2024-12-11 VITALS
DIASTOLIC BLOOD PRESSURE: 78 MMHG | OXYGEN SATURATION: 100 % | TEMPERATURE: 98 F | RESPIRATION RATE: 16 BRPM | SYSTOLIC BLOOD PRESSURE: 135 MMHG | HEART RATE: 74 BPM

## 2024-12-11 LAB
ALBUMIN SERPL ELPH-MCNC: 4 G/DL — SIGNIFICANT CHANGE UP (ref 3.3–5.2)
ALP SERPL-CCNC: 53 U/L — SIGNIFICANT CHANGE UP (ref 40–120)
ALT FLD-CCNC: 16 U/L — SIGNIFICANT CHANGE UP
ANION GAP SERPL CALC-SCNC: 10 MMOL/L — SIGNIFICANT CHANGE UP (ref 5–17)
AST SERPL-CCNC: 20 U/L — SIGNIFICANT CHANGE UP
BASOPHILS # BLD AUTO: 0.02 K/UL — SIGNIFICANT CHANGE UP (ref 0–0.2)
BASOPHILS NFR BLD AUTO: 0.3 % — SIGNIFICANT CHANGE UP (ref 0–2)
BILIRUB SERPL-MCNC: 0.9 MG/DL — SIGNIFICANT CHANGE UP (ref 0.4–2)
BUN SERPL-MCNC: 6.7 MG/DL — LOW (ref 8–20)
CALCIUM SERPL-MCNC: 8.9 MG/DL — SIGNIFICANT CHANGE UP (ref 8.4–10.5)
CHLORIDE SERPL-SCNC: 105 MMOL/L — SIGNIFICANT CHANGE UP (ref 96–108)
CO2 SERPL-SCNC: 26 MMOL/L — SIGNIFICANT CHANGE UP (ref 22–29)
CREAT SERPL-MCNC: 0.57 MG/DL — SIGNIFICANT CHANGE UP (ref 0.5–1.3)
EGFR: 118 ML/MIN/1.73M2 — SIGNIFICANT CHANGE UP
EOSINOPHIL # BLD AUTO: 0.08 K/UL — SIGNIFICANT CHANGE UP (ref 0–0.5)
EOSINOPHIL NFR BLD AUTO: 1.3 % — SIGNIFICANT CHANGE UP (ref 0–6)
GLUCOSE SERPL-MCNC: 86 MG/DL — SIGNIFICANT CHANGE UP (ref 70–99)
HCG SERPL-ACNC: <4 MIU/ML — SIGNIFICANT CHANGE UP
HCT VFR BLD CALC: 45.2 % — HIGH (ref 34.5–45)
HGB BLD-MCNC: 14.3 G/DL — SIGNIFICANT CHANGE UP (ref 11.5–15.5)
HIV 1 & 2 AB SERPL IA.RAPID: SIGNIFICANT CHANGE UP
IMM GRANULOCYTES NFR BLD AUTO: 0.2 % — SIGNIFICANT CHANGE UP (ref 0–0.9)
LIDOCAIN IGE QN: 23 U/L — SIGNIFICANT CHANGE UP (ref 22–51)
LYMPHOCYTES # BLD AUTO: 2.59 K/UL — SIGNIFICANT CHANGE UP (ref 1–3.3)
LYMPHOCYTES # BLD AUTO: 42.3 % — SIGNIFICANT CHANGE UP (ref 13–44)
MCHC RBC-ENTMCNC: 27.2 PG — SIGNIFICANT CHANGE UP (ref 27–34)
MCHC RBC-ENTMCNC: 31.6 G/DL — LOW (ref 32–36)
MCV RBC AUTO: 86.1 FL — SIGNIFICANT CHANGE UP (ref 80–100)
MONOCYTES # BLD AUTO: 0.47 K/UL — SIGNIFICANT CHANGE UP (ref 0–0.9)
MONOCYTES NFR BLD AUTO: 7.7 % — SIGNIFICANT CHANGE UP (ref 2–14)
NEUTROPHILS # BLD AUTO: 2.95 K/UL — SIGNIFICANT CHANGE UP (ref 1.8–7.4)
NEUTROPHILS NFR BLD AUTO: 48.2 % — SIGNIFICANT CHANGE UP (ref 43–77)
PLATELET # BLD AUTO: 294 K/UL — SIGNIFICANT CHANGE UP (ref 150–400)
POTASSIUM SERPL-MCNC: 3.9 MMOL/L — SIGNIFICANT CHANGE UP (ref 3.5–5.3)
POTASSIUM SERPL-SCNC: 3.9 MMOL/L — SIGNIFICANT CHANGE UP (ref 3.5–5.3)
PROT SERPL-MCNC: 7.1 G/DL — SIGNIFICANT CHANGE UP (ref 6.6–8.7)
RBC # BLD: 5.25 M/UL — HIGH (ref 3.8–5.2)
RBC # FLD: 12.4 % — SIGNIFICANT CHANGE UP (ref 10.3–14.5)
SODIUM SERPL-SCNC: 141 MMOL/L — SIGNIFICANT CHANGE UP (ref 135–145)
WBC # BLD: 6.12 K/UL — SIGNIFICANT CHANGE UP (ref 3.8–10.5)
WBC # FLD AUTO: 6.12 K/UL — SIGNIFICANT CHANGE UP (ref 3.8–10.5)

## 2024-12-11 PROCEDURE — 85025 COMPLETE CBC W/AUTO DIFF WBC: CPT

## 2024-12-11 PROCEDURE — 80053 COMPREHEN METABOLIC PANEL: CPT

## 2024-12-11 PROCEDURE — 96375 TX/PRO/DX INJ NEW DRUG ADDON: CPT

## 2024-12-11 PROCEDURE — 99284 EMERGENCY DEPT VISIT MOD MDM: CPT | Mod: 25

## 2024-12-11 PROCEDURE — 96374 THER/PROPH/DIAG INJ IV PUSH: CPT

## 2024-12-11 PROCEDURE — 36415 COLL VENOUS BLD VENIPUNCTURE: CPT

## 2024-12-11 PROCEDURE — 99284 EMERGENCY DEPT VISIT MOD MDM: CPT

## 2024-12-11 PROCEDURE — 84702 CHORIONIC GONADOTROPIN TEST: CPT

## 2024-12-11 PROCEDURE — 83690 ASSAY OF LIPASE: CPT

## 2024-12-11 PROCEDURE — 86703 HIV-1/HIV-2 1 RESULT ANTBDY: CPT

## 2024-12-11 RX ORDER — PANTOPRAZOLE SODIUM 40 MG/1
40 TABLET, DELAYED RELEASE ORAL ONCE
Refills: 0 | Status: COMPLETED | OUTPATIENT
Start: 2024-12-11 | End: 2024-12-11

## 2024-12-11 RX ORDER — ONDANSETRON HYDROCHLORIDE 4 MG/1
4 TABLET, FILM COATED ORAL ONCE
Refills: 0 | Status: COMPLETED | OUTPATIENT
Start: 2024-12-11 | End: 2024-12-11

## 2024-12-11 RX ORDER — SODIUM CHLORIDE 9 MG/ML
1000 INJECTION, SOLUTION INTRAMUSCULAR; INTRAVENOUS; SUBCUTANEOUS ONCE
Refills: 0 | Status: COMPLETED | OUTPATIENT
Start: 2024-12-11 | End: 2024-12-11

## 2024-12-11 RX ADMIN — PANTOPRAZOLE SODIUM 40 MILLIGRAM(S): 40 TABLET, DELAYED RELEASE ORAL at 12:28

## 2024-12-11 RX ADMIN — ONDANSETRON HYDROCHLORIDE 4 MILLIGRAM(S): 4 TABLET, FILM COATED ORAL at 12:28

## 2024-12-11 RX ADMIN — SODIUM CHLORIDE 1000 MILLILITER(S): 9 INJECTION, SOLUTION INTRAMUSCULAR; INTRAVENOUS; SUBCUTANEOUS at 12:28

## 2024-12-11 NOTE — ED ADULT NURSE NOTE - OBJECTIVE STATEMENT
pt comes into ED A&Ox4, c/o n/v for 2 days. diarrhea for 1 day. pt states that she has upper abdominal pain and unable to keep food down. pt currently eating upon assessment, started feeling nauseous. pt breathing even and unlabored on room air. no other complaints of pain or discomfort at this time.

## 2024-12-11 NOTE — ED PROVIDER NOTE - NSICDXNOPASTSURGICALHX_GEN_ALL_ED
Patient Seen in: [facility]      History   Patient presents with: Foot Pain    Stated Complaint: nerve pain in both feet    HPI/Subjective:   HPI    This is a 43-year-old female presenting with bilateral foot pain.   Patient states, she has n EXCISION OF CHALAZION, SINGLE - OD - RIGHT EYE Right 6.27/2017    Nasally   • HC ARTHROCENTESIS OR INJECT MAJOR JOINT W/O US     • HYSTERECTOMY  1996    KAI   • OTHER      Lap Nissen Fundoplication surgery   • OTHER SURGICAL HISTORY  2005,2007,2008    [employer] Normal range of motion. Feet:      Comments: Bilateral feet no redness warmth or tenderness with palpation DPs 2+. Bilateral calfs no redness warmth or tenderness to palpation  Skin:     General: Skin is warm and dry.       Capillary Refill: Capillary re times daily as needed for Pain (No driving or operating machinery while taking this medication as it may cause drowsiness). , Print, Disp-6 tablet, R-0 <-- Click to add NO significant Past Surgical History

## 2024-12-11 NOTE — ED PROVIDER NOTE - PATIENT PORTAL LINK FT
You can access the FollowMyHealth Patient Portal offered by [facility] by registering at the following website: [email or web address]. By joining [employer]’s FollowMyHealth portal, you will also be able to view your health information using other applications (apps) compatible with our system.

## 2024-12-11 NOTE — ED ADULT TRIAGE NOTE - CHIEF COMPLAINT QUOTE
vomiting for 2 days, diarrhea for 1 day.  pt states she is having upper abdominal pain and unable to keep food down.  tolerating liquids at this time.  denies fevers.  respirations even and unlabored.

## 2025-03-07 ENCOUNTER — APPOINTMENT (OUTPATIENT)
Dept: OBGYN | Facility: CLINIC | Age: 40
End: 2025-03-07
Payer: COMMERCIAL

## 2025-03-07 VITALS
BODY MASS INDEX: 23.9 KG/M2 | SYSTOLIC BLOOD PRESSURE: 125 MMHG | HEIGHT: 64 IN | WEIGHT: 140 LBS | DIASTOLIC BLOOD PRESSURE: 70 MMHG

## 2025-03-07 DIAGNOSIS — Z01.419 ENCOUNTER FOR GYNECOLOGICAL EXAMINATION (GENERAL) (ROUTINE) W/OUT ABNORMAL FINDINGS: ICD-10-CM

## 2025-03-07 DIAGNOSIS — N64.4 MASTODYNIA: ICD-10-CM

## 2025-03-07 PROCEDURE — 99459 PELVIC EXAMINATION: CPT

## 2025-03-07 PROCEDURE — 99396 PREV VISIT EST AGE 40-64: CPT

## 2025-03-07 RX ORDER — NORETHINDRONE ACETATE AND ETHINYL ESTRADIOL AND FERROUS FUMARATE 1MG-20(21)
1-20 KIT ORAL DAILY
Qty: 3 | Refills: 1 | Status: ACTIVE | COMMUNITY
Start: 2025-03-07 | End: 1900-01-01

## 2025-03-16 NOTE — ED ADULT NURSE NOTE - CHPI ED NUR SEVERITY2
March 16, 2025      [facility]  [address], [address]  [place] [address]  Phone: [phone]  Fax: [phone]       Patient: Lola Mendoza   YOB: 1980  Date of Visit: 03/16/2025    To Whom It May Concern:    Pankaj Mendoza  was at [facility] on 03/16/2025. The patient may return to work/school on 3/18/2025 if symptoms have improved. If you have any questions or concerns, or if I can be of further assistance, please do not hesitate to contact me.    Sincerely,     Kailey Huynh NP     
PAIN SCALE 8 OF 10.

## 2025-04-12 ENCOUNTER — NON-APPOINTMENT (OUTPATIENT)
Age: 40
End: 2025-04-12

## 2025-04-15 DIAGNOSIS — N64.89 OTHER SPECIFIED DISORDERS OF BREAST: ICD-10-CM

## 2025-04-15 DIAGNOSIS — R92.30 DENSE BREASTS, UNSPECIFIED: ICD-10-CM

## 2025-04-18 ENCOUNTER — APPOINTMENT (OUTPATIENT)
Dept: OBGYN | Facility: CLINIC | Age: 40
End: 2025-04-18

## 2025-04-18 VITALS
WEIGHT: 147.25 LBS | SYSTOLIC BLOOD PRESSURE: 128 MMHG | HEIGHT: 64 IN | BODY MASS INDEX: 25.14 KG/M2 | DIASTOLIC BLOOD PRESSURE: 82 MMHG

## 2025-04-18 DIAGNOSIS — Z00.00 ENCOUNTER FOR GENERAL ADULT MEDICAL EXAMINATION W/OUT ABNORMAL FINDINGS: ICD-10-CM

## 2025-04-18 PROCEDURE — 99396 PREV VISIT EST AGE 40-64: CPT

## 2025-04-18 PROCEDURE — 99459 PELVIC EXAMINATION: CPT

## 2025-04-24 LAB — CYTOLOGY CVX/VAG DOC THIN PREP: NORMAL

## 2025-05-12 ENCOUNTER — NON-APPOINTMENT (OUTPATIENT)
Age: 40
End: 2025-05-12

## 2025-05-12 DIAGNOSIS — N60.02 SOLITARY CYST OF LEFT BREAST: ICD-10-CM

## 2025-05-16 ENCOUNTER — APPOINTMENT (OUTPATIENT)
Dept: OBGYN | Facility: CLINIC | Age: 40
End: 2025-05-16

## 2025-05-27 NOTE — ED PROVIDER NOTE - SKIN, MLM
Group Therapy Note    Date: 5/27/2025    Group Start Time: 1000  Group End Time: 1045  Group Topic: Recreational    [facility] ADULT    Cj Srinivasan        Group Therapy Note    Attendees: 4/8    Group: Goal setting flower   Focus: Recreational therapy group focused on the topic \"What can I do to better myself?\". Patients developed a goal setting flower, where they listed a goal in each petal that would inspire positive change. The group may help promote change and self-improvement, increase self-awareness, motivation, and focus, and decrease stress and anxiety.          Notes: Patient did not attend group.   Certified Therapeutic Recreation Specialist    Cj Srinivasan    Skin normal color for race, warm, dry and intact. No evidence of rash.

## 2025-08-01 NOTE — OB PROVIDER TRIAGE NOTE - NS_DISPOSITION_OBGYN_ALL_OB
Pt presents to the ED c/o nausea, diarrhea, and chest pressure. Pt states \"I think I have pneumonia\". No medical hx, NKA. Symptoms began this AM.   Discharge